# Patient Record
Sex: MALE | Race: BLACK OR AFRICAN AMERICAN | NOT HISPANIC OR LATINO | Employment: FULL TIME | URBAN - METROPOLITAN AREA
[De-identification: names, ages, dates, MRNs, and addresses within clinical notes are randomized per-mention and may not be internally consistent; named-entity substitution may affect disease eponyms.]

---

## 2017-01-05 ENCOUNTER — ALLIED HEALTH/NURSE VISIT (OUTPATIENT)
Dept: NURSING | Facility: CLINIC | Age: 37
End: 2017-01-05

## 2017-01-05 DIAGNOSIS — Z11.1 SCREENING EXAMINATION FOR PULMONARY TUBERCULOSIS: Primary | ICD-10-CM

## 2017-01-05 PROCEDURE — 99207 ZZC NO CHARGE NURSE ONLY: CPT

## 2017-01-05 PROCEDURE — 86580 TB INTRADERMAL TEST: CPT

## 2017-01-05 NOTE — NURSING NOTE
The patient is asked the following questions today and these are his answers:    -Have you had a mantoux administered in the past 30 days?    No  -Have you had a previous positive Mantoux.  No  -Have you received BCG in the past.  No  -Have you had a live vaccine  (MMR, Varicella, OPV, Yellow Fever) in the last 6 weeks.  No  -Have you had and active  viral or bacterial infection in the past 6 weeks.  No  -Have you received corticosteroids or immunosuppressive agents in the past 6 weeks.  No  -Have you been diagnosed with HIV?  No  -Do you have a maglinancy?  No

## 2017-01-18 ENCOUNTER — ALLIED HEALTH/NURSE VISIT (OUTPATIENT)
Dept: NURSING | Facility: CLINIC | Age: 37
End: 2017-01-18

## 2017-01-18 ENCOUNTER — TELEPHONE (OUTPATIENT)
Dept: NURSING | Facility: CLINIC | Age: 37
End: 2017-01-18

## 2017-01-18 DIAGNOSIS — Z11.1 SCREENING EXAMINATION FOR PULMONARY TUBERCULOSIS: Primary | ICD-10-CM

## 2017-01-18 PROCEDURE — 86480 TB TEST CELL IMMUN MEASURE: CPT | Performed by: PHYSICIAN ASSISTANT

## 2017-01-18 PROCEDURE — 99207 ZZC NO CHARGE NURSE ONLY: CPT

## 2017-01-18 PROCEDURE — 36415 COLL VENOUS BLD VENIPUNCTURE: CPT | Performed by: PHYSICIAN ASSISTANT

## 2017-01-18 NOTE — TELEPHONE ENCOUNTER
Spoke with pt's wife with pt's verbal ok, wanted to know why pt had a blood test instead of regular mantoux. Explained that sometimes mantoux can give a false positive and this test will be more accurate. Verbalized understanding.   Clarisa Zuleta RN

## 2017-01-20 LAB
M TB TUBERC IFN-G BLD QL: ABNORMAL
M TB TUBERC IFN-G/MITOGEN IGNF BLD: ABNORMAL IU/ML

## 2017-01-24 ENCOUNTER — TELEPHONE (OUTPATIENT)
Dept: FAMILY MEDICINE | Facility: CLINIC | Age: 37
End: 2017-01-24

## 2017-01-24 NOTE — TELEPHONE ENCOUNTER
Notes Recorded by Misty Sloan on 1/24/2017 at 1:20 PM  Patient doesn't want to keep appt on 01/25/17 as he has no insurance. Spouse Diana would like to discuss results and f/u from here without being seen. Auth is scanned in document table to speak with her.   (appt still in Epic)  ------    Notes Recorded by Shae Armstrong on 1/23/2017 at 11:42 AM  Left message on voicemail for patient to return call to  line([phone])  ------    Notes Recorded by Juliet Preston PA-C on 1/23/2017 at 11:15 AM  Patient needs follow up in the clinic to discuss his positive result.

## 2017-01-24 NOTE — TELEPHONE ENCOUNTER
ADRIANNE  Spoke with wife, Diana, informed her that Juliet stated he needs to come in to be seen. She stated he will be here for appointment 1/25 at 8:40.  Clarisa Zuleta RN

## 2017-01-25 ENCOUNTER — TELEPHONE (OUTPATIENT)
Dept: FAMILY MEDICINE | Facility: CLINIC | Age: 37
End: 2017-01-25

## 2017-01-25 ENCOUNTER — RADIANT APPOINTMENT (OUTPATIENT)
Dept: GENERAL RADIOLOGY | Facility: CLINIC | Age: 37
End: 2017-01-25
Attending: PHYSICIAN ASSISTANT

## 2017-01-25 ENCOUNTER — OFFICE VISIT (OUTPATIENT)
Dept: FAMILY MEDICINE | Facility: CLINIC | Age: 37
End: 2017-01-25

## 2017-01-25 VITALS
WEIGHT: 155.8 LBS | TEMPERATURE: 97.7 F | OXYGEN SATURATION: 97 % | HEART RATE: 78 BPM | HEIGHT: 67 IN | SYSTOLIC BLOOD PRESSURE: 136 MMHG | BODY MASS INDEX: 24.45 KG/M2 | DIASTOLIC BLOOD PRESSURE: 51 MMHG

## 2017-01-25 DIAGNOSIS — B07.8 COMMON WART: ICD-10-CM

## 2017-01-25 DIAGNOSIS — R76.12 POSITIVE QUANTIFERON-TB GOLD TEST: ICD-10-CM

## 2017-01-25 DIAGNOSIS — R76.12 POSITIVE QUANTIFERON-TB GOLD TEST: Primary | ICD-10-CM

## 2017-01-25 DIAGNOSIS — Z22.7 LATENT TUBERCULOSIS: ICD-10-CM

## 2017-01-25 LAB
ALBUMIN SERPL-MCNC: 3.8 G/DL (ref 3.4–5)
ALP SERPL-CCNC: 85 U/L (ref 40–150)
ALT SERPL W P-5'-P-CCNC: 52 U/L (ref 0–70)
AST SERPL W P-5'-P-CCNC: 50 U/L (ref 0–45)
BILIRUB DIRECT SERPL-MCNC: <0.1 MG/DL (ref 0–0.2)
BILIRUB SERPL-MCNC: 0.3 MG/DL (ref 0.2–1.3)
PROT SERPL-MCNC: 7.7 G/DL (ref 6.8–8.8)

## 2017-01-25 PROCEDURE — 36415 COLL VENOUS BLD VENIPUNCTURE: CPT | Performed by: PHYSICIAN ASSISTANT

## 2017-01-25 PROCEDURE — 99203 OFFICE O/P NEW LOW 30 MIN: CPT | Performed by: PHYSICIAN ASSISTANT

## 2017-01-25 PROCEDURE — 71020 XR CHEST 2 VW: CPT

## 2017-01-25 PROCEDURE — 80076 HEPATIC FUNCTION PANEL: CPT | Performed by: PHYSICIAN ASSISTANT

## 2017-01-25 RX ORDER — ISONIAZID 300 MG/1
300 TABLET ORAL DAILY
Qty: 90 TABLET | Refills: 2 | Status: SHIPPED | OUTPATIENT
Start: 2017-01-25 | End: 2018-03-19

## 2017-01-25 NOTE — Clinical Note
[facility]  [address]  [place] [address]  [phone]        January 25, 2017      Sea Solis  [address] [address]        Dear Sea,      Your chest x-ray is negative.      Results for orders placed or performed in visit on 01/25/17   XR Chest 2 Views    Narrative    CHEST TWO VIEWS  1/25/2017 8:50 AM     HISTORY: 36-year-old with nonspecific reaction to cell-mediated  immunity measurement of gamma interferon antigen response without  active tuberculosis.    COMPARISON: None.       Impression    IMPRESSION: Heart size is normal. No pleural effusion, pneumothorax,  or abnormal  area of consolidation. No evidence of hilar or right  paratracheal lymphadenopathy.    GEO RUSH MD       If you have any questions or concerns, please call myself or my nurse at [phone].    Sincerely,      Juliet Preston PA-C/gallitoo

## 2017-01-25 NOTE — LETTER
[facility]  [address]  [place] [address]  [phone]        February 13, 2018    Sea Solis  [address]  [facility] [address]              Dear Sea Solis    This is to remind you that your Hepatic panel  is due.    You may call our office at [phone] to schedule an appointment.    Please disregard this notice if you have already had your labs drawn or made an appointment.        Sincerely,        Juliet Preston PA-C

## 2017-01-25 NOTE — TELEPHONE ENCOUNTER
[facility] forms for latent TB medication completed and faxed to [facility] along with the rx. Awaiting medication to be sent to clinic. Will inform patient when we receive medication. Encounter postponed.

## 2017-01-25 NOTE — PROGRESS NOTES
SUBJECTIVE:                                                    Sea Solis is a 36 year old male who presents to clinic today for the following health issues:    Positive Quantiferon TB Gold  Sometimes coughs when he smokes  Denies fevers, night sweats  Immigrating from [place]      Problem list and histories reviewed & adjusted, as indicated.  Additional history: as documented    Patient Active Problem List   Diagnosis     Latent tuberculosis     No past surgical history on file.    Social History   Substance Use Topics     Smoking status: Not on file     Smokeless tobacco: Not on file     Alcohol Use: Not on file     No family history on file.        ROS:  Constitutional, cardiovascular, pulmonary, skin systems are negative, except as otherwise noted.    OBJECTIVE:                                                    There were no vitals taken for this visit.  There is no height or weight on file to calculate BMI.  GENERAL APPEARANCE: healthy, alert and no distress  RESP: lungs clear to auscultation - no rales, rhonchi or wheezes  CV: regular rates and rhythm, normal S1 S2, no S3 or S4, no murmur, click or rub and no irregular beats  SKIN: multiple warts noted on palms       ASSESSMENT:                                                      1. Positive QuantiFERON-TB Gold test    2. Latent tuberculosis    3. Common wart         PLAN:                                                    Will fill out [facility] paperwork in order to get patient treated. CXR today.     Patient Instructions    some Compound W and use this on the warts on your hands. Apply this every day until they're gone.    Trial some Cortisone 10 ointment on the bump on your abdomen. This is likely an inflammatory response from the nickel on your belt or pants buttons.  Use the Cortisone 10 three times daily x7 days.    We need to repeat your lab work in 1 month, 3 months, 6 months, and 9 months.  You'll be considered treated for latent TB when  you have completed 9 months of treatment.       The patient was in agreement with the plan today and had no questions or concerns prior to leaving the clinic.     Juliet Preston PA-C  [facility]

## 2017-01-25 NOTE — Clinical Note
[facility]  [address]  [place] [address]  Phone: [phone]    January 25, 2017        Sea Solis  [address] [address]          To whom it may concern:    RE: Sea Solis    Patient was seen and treated today at our clinic. He has been diagnosed with latent tuberculosis and will be starting treatment in the near future.     Please contact me for questions or concerns.      Sincerely,          Juliet Preston PA-C

## 2017-01-25 NOTE — PATIENT INSTRUCTIONS
some Compound W and use this on the warts on your hands. Apply this every day until they're gone.    Trial some Cortisone 10 ointment on the bump on your abdomen. This is likely an inflammatory response from the nickel on your belt or pants buttons.  Use the Cortisone 10 three times daily x7 days.    We need to repeat your lab work in 1 month, 3 months, 6 months, and 9 months.  You'll be considered treated for latent TB when you have completed 9 months of treatment.

## 2017-01-25 NOTE — Clinical Note
[facility]  [address]  [place] [address]  [phone]        January 26, 2017      Sea Solis  [address] [address] [address]        Dear Sea,      One of your liver enzymes is very mildly elevated - if you use alcohol or Tylenol please minimize your use.   We'll recheck this panel 1 month after you've started your TB medication.    Results for orders placed or performed in visit on 01/25/17   Hepatic panel   Result Value Ref Range    Bilirubin Direct <0.1 0.0 - 0.2 mg/dL    Bilirubin Total 0.3 0.2 - 1.3 mg/dL    Albumin 3.8 3.4 - 5.0 g/dL    Protein Total 7.7 6.8 - 8.8 g/dL    Alkaline Phosphatase 85 40 - 150 U/L    ALT 52 0 - 70 U/L    AST 50 (H) 0 - 45 U/L       If you have any questions or concerns, please call myself or my nurse at [phone].    Sincerely,      Juliet Preston PA-C/gallitoo

## 2017-01-25 NOTE — PROGRESS NOTES
Quick Note:    Please send the following letter to the patient:    Sea,    Your chest x-ray is negative.     Please call me with any questions or concerns.          Juliet Preston PA-C  ______

## 2017-01-25 NOTE — NURSING NOTE
"Chief Complaint   Patient presents with     PPD Reading       Initial /51 mmHg  Pulse 78  Temp(Src) 97.7  F (36.5  C) (Oral)  Ht 5' 7\" (1.702 m)  Wt 155 lb 12.8 oz (70.67 kg)  BMI 24.40 kg/m2  SpO2 97% Estimated body mass index is 24.4 kg/(m^2) as calculated from the following:    Height as of this encounter: 5' 7\" (1.702 m).    Weight as of this encounter: 155 lb 12.8 oz (70.67 kg).  BP completed using cuff size: regular  Leta Neal MA      "

## 2017-01-25 NOTE — MR AVS SNAPSHOT
After Visit Summary   1/25/2017    Sea Solis    MRN: [id number]           Patient Information     Date Of Birth          1980        Visit Information        Provider Department      1/25/2017 8:40 AM Juliet Preston PA-C [facility]        Today's Diagnoses     Positive QuantiFERON-TB Gold test    -  1     Latent tuberculosis         Common wart           Care Instructions     some Compound W and use this on the warts on your hands. Apply this every day until they're gone.    Trial some Cortisone 10 ointment on the bump on your abdomen. This is likely an inflammatory response from the nickel on your belt or pants buttons.  Use the Cortisone 10 three times daily x7 days.    We need to repeat your lab work in 1 month, 3 months, 6 months, and 9 months.  You'll be considered treated for latent TB when you have completed 9 months of treatment.        Follow-ups after your visit        Future tests that were ordered for you today     Open Standing Orders        Priority Remaining Interval Expires Ordered    Hepatic panel Routine 5/5 1/25/2018 1/25/2017            Who to contact     Normal or non-critical lab and imaging results will be communicated to you by Ripple TVt, letter or phone within 4 business days after the clinic has received the results. If you do not hear from us within 7 days, please contact the clinic through Ripple TVt or phone. If you have a critical or abnormal lab result, we will notify you by phone as soon as possible.  Submit refill requests through StyleQ or call your pharmacy and they will forward the refill request to us. Please allow 3 business days for your refill to be completed.          If you need to speak with a  for additional information , please call: [phone]             Additional Information About Your Visit        StyleQ Information     StyleQ lets you send messages to your doctor, view your test results, renew  "your prescriptions, schedule appointments and more. To sign up, go to [email or web address] . Click on \"Log in\" on the left side of the screen, which will take you to the Welcome page. Then click on \"Sign up Now\" on the right side of the page.     You will be asked to enter the access code listed below, as well as some personal information. Please follow the directions to create your username and password.     Your access code is: [email or web address]  Expires: 2017  9:10 AM     Your access code will  in 90 days. If you need help or a new code, please call your [place] clinic or [phone].        Care EveryWhere ID     This is your Care EveryWhere ID. This could be used by other organizations to access your [place] medical records  [id number]        Your Vitals Were     Pulse Temperature Height BMI (Body Mass Index) Pulse Oximetry       78 97.7  F (36.5  C) (Oral) 5' 7\" (1.702 m) 24.40 kg/m2 97%        Blood Pressure from Last 3 Encounters:   17 136/51    Weight from Last 3 Encounters:   17 155 lb 12.8 oz (70.67 kg)                 Today's Medication Changes          These changes are accurate as of: 17  9:10 AM.  If you have any questions, ask your nurse or doctor.               Start taking these medicines.        Dose/Directions    isoniazid 300 MG tablet   Commonly known as:  NYDRAZID   Used for:  Latent tuberculosis   Started by:  Juliet Preston PA-C        Dose:  300 mg   Take 1 tablet (300 mg) by mouth daily   Quantity:  90 tablet   Refills:  2            Where to get your medicines      Some of these will need a paper prescription and others can be bought over the counter.  Ask your nurse if you have questions.     Bring a paper prescription for each of these medications    - isoniazid 300 MG tablet             Primary Care Provider Office Phone #    Rosa Mahmood [facility] [phone]       No address on file        Thank you!     Thank you for choosing [facility] " [facility]  for your care. Our goal is always to provide you with excellent care. Hearing back from our patients is one way we can continue to improve our services. Please take a few minutes to complete the written survey that you may receive in the mail after your visit with us. Thank you!             Your Updated Medication List - Protect others around you: Learn how to safely use, store and throw away your medicines at www.disposemymeds.org.          This list is accurate as of: 1/25/17  9:10 AM.  Always use your most recent med list.                   Brand Name Dispense Instructions for use    isoniazid 300 MG tablet    NYDRAZID    90 tablet    Take 1 tablet (300 mg) by mouth daily

## 2017-01-25 NOTE — LETTER
[facility]  [address] [place] [address]  [phone]        April 18, 2018    Sea Solis  [address]  [place] [address]              Dear Sea Solis    This is to remind you that your Hepatic panel is due.    You may call our office at [phone] to schedule an appointment.    Please disregard this notice if you have already had your labs drawn or made an appointment.        Sincerely,        Juliet Preston PA-C

## 2017-01-26 ENCOUNTER — TELEPHONE (OUTPATIENT)
Dept: FAMILY MEDICINE | Facility: CLINIC | Age: 37
End: 2017-01-26

## 2017-01-26 NOTE — PROGRESS NOTES
Quick Note:    Please send the following letter to the patient:    Sea,    One of your liver enzymes is very mildly elevated - if you use alcohol or Tylenol please minimize your use.   We'll recheck this panel 1 month after you've started your TB medication.     Please call me with any questions or concerns.          Juliet Preston PA-C  ______

## 2017-02-03 ENCOUNTER — TELEPHONE (OUTPATIENT)
Dept: FAMILY MEDICINE | Facility: CLINIC | Age: 37
End: 2017-02-03

## 2017-02-03 NOTE — TELEPHONE ENCOUNTER
Forms completed stating that patient picked up and started medication today. Form faxed to [facility] and sent to be scanned.

## 2017-03-01 ENCOUNTER — TELEPHONE (OUTPATIENT)
Dept: FAMILY MEDICINE | Facility: CLINIC | Age: 37
End: 2017-03-01

## 2017-03-01 NOTE — TELEPHONE ENCOUNTER
Received Isoniazid from [facility]. Informed patient medication has been brought to [facility] pharmacy for . Patient voiced understanding. States he will  medication tomorrow.

## 2017-03-29 ENCOUNTER — TELEPHONE (OUTPATIENT)
Dept: FAMILY MEDICINE | Facility: CLINIC | Age: 37
End: 2017-03-29

## 2017-03-29 NOTE — TELEPHONE ENCOUNTER
Received Isoniazid from [facility]. Informed patient medication has been brought to [facility] pharmacy for . Patient voiced understanding. Patient states he will  Monday.

## 2017-04-03 ENCOUNTER — OFFICE VISIT (OUTPATIENT)
Dept: FAMILY MEDICINE | Facility: CLINIC | Age: 37
End: 2017-04-03

## 2017-04-03 VITALS
WEIGHT: 153 LBS | TEMPERATURE: 99 F | HEART RATE: 68 BPM | DIASTOLIC BLOOD PRESSURE: 74 MMHG | SYSTOLIC BLOOD PRESSURE: 126 MMHG | HEIGHT: 67 IN | OXYGEN SATURATION: 100 % | BODY MASS INDEX: 24.01 KG/M2

## 2017-04-03 DIAGNOSIS — Z22.7 LATENT TUBERCULOSIS: ICD-10-CM

## 2017-04-03 DIAGNOSIS — L73.1 INGROWN HAIR: Primary | ICD-10-CM

## 2017-04-03 LAB
ALBUMIN SERPL-MCNC: 4.1 G/DL (ref 3.4–5)
ALP SERPL-CCNC: 60 U/L (ref 40–150)
ALT SERPL W P-5'-P-CCNC: 28 U/L (ref 0–70)
AST SERPL W P-5'-P-CCNC: 18 U/L (ref 0–45)
BILIRUB DIRECT SERPL-MCNC: <0.1 MG/DL (ref 0–0.2)
BILIRUB SERPL-MCNC: 0.4 MG/DL (ref 0.2–1.3)
PROT SERPL-MCNC: 7.4 G/DL (ref 6.8–8.8)

## 2017-04-03 PROCEDURE — 99212 OFFICE O/P EST SF 10 MIN: CPT | Performed by: PHYSICIAN ASSISTANT

## 2017-04-03 PROCEDURE — 80076 HEPATIC FUNCTION PANEL: CPT | Performed by: PHYSICIAN ASSISTANT

## 2017-04-03 PROCEDURE — 36415 COLL VENOUS BLD VENIPUNCTURE: CPT | Performed by: PHYSICIAN ASSISTANT

## 2017-04-03 NOTE — MR AVS SNAPSHOT
"              After Visit Summary   4/3/2017    Sea Solis    MRN: [id number]           Patient Information     Date Of Birth          1980        Visit Information        Provider Department      4/3/2017 11:20 AM Juliet Preston PA-C [facility] [place]        Today's Diagnoses     Latent tuberculosis           Follow-ups after your visit        Who to contact     Normal or non-critical lab and imaging results will be communicated to you by MyChart, letter or phone within 4 business days after the clinic has received the results. If you do not hear from us within 7 days, please contact the clinic through MyChart or phone. If you have a critical or abnormal lab result, we will notify you by phone as soon as possible.  Submit refill requests through Bridg or call your pharmacy and they will forward the refill request to us. Please allow 3 business days for your refill to be completed.          If you need to speak with a  for additional information , please call: [phone]             Additional Information About Your Visit        Bridg Information     Bridg lets you send messages to your doctor, view your test results, renew your prescriptions, schedule appointments and more. To sign up, go to [email or web address] . Click on \"Log in\" on the left side of the screen, which will take you to the Welcome page. Then click on \"Sign up Now\" on the right side of the page.     You will be asked to enter the access code listed below, as well as some personal information. Please follow the directions to create your username and password.     Your access code is: [email or web address]  Expires: 2017 10:10 AM     Your access code will  in 90 days. If you need help or a new code, please call your [place] clinic or [phone].        Care EveryWhere ID     This is your Care EveryWhere ID. This could be used by other organizations to access your [place] medical " "records  [id number]        Your Vitals Were     Pulse Temperature Height Pulse Oximetry BMI (Body Mass Index)       68 99  F (37.2  C) (Oral) 5' 7\" (1.702 m) 100% 23.96 kg/m2        Blood Pressure from Last 3 Encounters:   04/03/17 126/74   01/25/17 136/51    Weight from Last 3 Encounters:   04/03/17 153 lb (69.4 kg)   01/25/17 155 lb 12.8 oz (70.7 kg)              We Performed the Following     Hepatic panel        Primary Care Provider Office Phone #    [facility] [phone]       No address on file        Thank you!     Thank you for choosing [facility]  for your care. Our goal is always to provide you with excellent care. Hearing back from our patients is one way we can continue to improve our services. Please take a few minutes to complete the written survey that you may receive in the mail after your visit with us. Thank you!             Your Updated Medication List - Protect others around you: Learn how to safely use, store and throw away your medicines at www.disposemymeds.org.          This list is accurate as of: 4/3/17 11:36 AM.  Always use your most recent med list.                   Brand Name Dispense Instructions for use    isoniazid 300 MG tablet    NYDRAZID    90 tablet    Take 1 tablet (300 mg) by mouth daily         "

## 2017-04-03 NOTE — NURSING NOTE
"Chief Complaint   Patient presents with     Derm Problem       Initial /74  Pulse 68  Temp 99  F (37.2  C) (Oral)  Ht 5' 7\" (1.702 m)  Wt 153 lb (69.4 kg)  SpO2 100%  BMI 23.96 kg/m2 Estimated body mass index is 23.96 kg/(m^2) as calculated from the following:    Height as of this encounter: 5' 7\" (1.702 m).    Weight as of this encounter: 153 lb (69.4 kg).  Medication Reconciliation: complete   Leta Neal MA      "

## 2017-04-03 NOTE — LETTER
[facility]  [address]  [place] [address]  [phone]        April 4, 2017      Sea Solis  [address]  [place] [address]        Dear Sea,      Your liver enzymes remain normal. We'll recheck these in 3 months.     Results for orders placed or performed in visit on 04/03/17   Hepatic panel   Result Value Ref Range    Bilirubin Direct <0.1 0.0 - 0.2 mg/dL    Bilirubin Total 0.4 0.2 - 1.3 mg/dL    Albumin 4.1 3.4 - 5.0 g/dL    Protein Total 7.4 6.8 - 8.8 g/dL    Alkaline Phosphatase 60 40 - 150 U/L    ALT 28 0 - 70 U/L    AST 18 0 - 45 U/L       If you have any questions or concerns, please call myself or my nurse at [phone].    Sincerely,      Juliet Preston PA-C/gallitoo

## 2017-04-03 NOTE — PROGRESS NOTES
"  SUBJECTIVE:                                                    Sea Solis is a 36 year old male who presents to clinic today for the following health issues:    Rash     Onset: x1 year     Description:   Location: pelvic area   Character: bumps   Itching (Pruritis): no     Progression of Symptoms:  same    Accompanying Signs & Symptoms:  Fever: no   Body aches or joint pain: no   Sore throat symptoms: no   Recent cold symptoms: no    History:   Previous similar rash: no     Precipitating factors:   Exposure to similar rash: no   New exposures: None   Recent travel: no     Alleviating factors:  None      Therapies Tried and outcome: cortisone 10 - no relef         Problem list and histories reviewed & adjusted, as indicated.  Additional history: as documented    Patient Active Problem List   Diagnosis     Latent tuberculosis     No past surgical history on file.    Social History   Substance Use Topics     Smoking status: Current Some Day Smoker     Smokeless tobacco: Never Used     Alcohol use 0.0 oz/week     0 Standard drinks or equivalent per week      Comment: 2-3x week     No family history on file.        Reviewed and updated as needed this visit by clinical staff  Tobacco  Allergies  Meds       Reviewed and updated as needed this visit by Provider         ROS:  Constitutional, skin systems are negative, except as otherwise noted.    OBJECTIVE:                                                    /74  Pulse 68  Temp 99  F (37.2  C) (Oral)  Ht 5' 7\" (1.702 m)  Wt 153 lb (69.4 kg)  SpO2 100%  BMI 23.96 kg/m2  Body mass index is 23.96 kg/(m^2).  GENERAL APPEARANCE: healthy, alert and no distress  SKIN: 2 areas of induration/firmness noted at belt line, non tender, non erythematous       ASSESSMENT:                                                      1. Ingrown hair    2. Latent tuberculosis         PLAN:                                                    As discussed previously this is " likely ingrown hairs. He was treated for dermatitis, but nothing has really changed. Patient does not currently have insurance and would like to wait to attempt incision with exploration or area. No signs of infection today.     Labs while he's here.    The patient was in agreement with the plan today and had no questions or concerns prior to leaving the clinic.     Juliet Preston PA-C  [facility]

## 2017-04-04 NOTE — PROGRESS NOTES
Please send the following letter to the patient:    Sea,    Your liver enzymes remain normal. We'll recheck these in 3 months.    Please call me with any questions or concerns.          Juliet Preston PA-C

## 2017-04-27 ENCOUNTER — TELEPHONE (OUTPATIENT)
Dept: FAMILY MEDICINE | Facility: CLINIC | Age: 37
End: 2017-04-27

## 2017-04-27 NOTE — TELEPHONE ENCOUNTER
Received Isoniazid from [facility]. Informed patient medication has been brought to [facility] pharmacy for . Patient voiced understanding.

## 2017-05-23 ENCOUNTER — TELEPHONE (OUTPATIENT)
Dept: FAMILY MEDICINE | Facility: CLINIC | Age: 37
End: 2017-05-23

## 2017-05-23 NOTE — TELEPHONE ENCOUNTER
Received Isoniazid from [facility]. Brought rx to [facility] pharmacy. Left message for patient to call back pod 2 line.([phone])

## 2017-06-21 ENCOUNTER — TELEPHONE (OUTPATIENT)
Dept: FAMILY MEDICINE | Facility: CLINIC | Age: 37
End: 2017-06-21

## 2017-07-14 ENCOUNTER — TELEPHONE (OUTPATIENT)
Dept: FAMILY MEDICINE | Facility: CLINIC | Age: 37
End: 2017-07-14

## 2017-07-14 NOTE — TELEPHONE ENCOUNTER
Received Isoniazid from [facility]. Brought rx to [facility] pharmacy. Patient informed.     Left message for patient to call back pod 2 line.([phone])

## 2017-08-14 ENCOUNTER — TELEPHONE (OUTPATIENT)
Dept: FAMILY MEDICINE | Facility: CLINIC | Age: 37
End: 2017-08-14

## 2017-08-14 NOTE — TELEPHONE ENCOUNTER
Received Isoniazid 300 mg tablet from [facility]. Brought rx to [facility] pharmacy.    Left message on voicemail for patient to return call

## 2017-08-15 NOTE — TELEPHONE ENCOUNTER
Spoke with patient advised TB med at [facility] for him to

## 2017-09-13 ENCOUNTER — TELEPHONE (OUTPATIENT)
Dept: FAMILY MEDICINE | Facility: CLINIC | Age: 37
End: 2017-09-13

## 2017-09-13 NOTE — TELEPHONE ENCOUNTER
Received Isoniazid 300mg and brought to the [facility] pharmacy. LVM for patient to return call to pod two.       Emi Graham CMA

## 2018-03-19 ENCOUNTER — RADIANT APPOINTMENT (OUTPATIENT)
Dept: GENERAL RADIOLOGY | Facility: CLINIC | Age: 38
End: 2018-03-19
Payer: COMMERCIAL

## 2018-03-19 ENCOUNTER — OFFICE VISIT (OUTPATIENT)
Dept: FAMILY MEDICINE | Facility: CLINIC | Age: 38
End: 2018-03-19
Payer: COMMERCIAL

## 2018-03-19 VITALS
OXYGEN SATURATION: 99 % | HEART RATE: 74 BPM | TEMPERATURE: 97.4 F | BODY MASS INDEX: 25.36 KG/M2 | SYSTOLIC BLOOD PRESSURE: 107 MMHG | WEIGHT: 157.8 LBS | DIASTOLIC BLOOD PRESSURE: 69 MMHG | HEIGHT: 66 IN

## 2018-03-19 DIAGNOSIS — Z13.220 SCREENING FOR LIPOID DISORDERS: ICD-10-CM

## 2018-03-19 DIAGNOSIS — N52.9 ERECTILE DYSFUNCTION, UNSPECIFIED ERECTILE DYSFUNCTION TYPE: ICD-10-CM

## 2018-03-19 DIAGNOSIS — L72.3 SEBACEOUS CYST: ICD-10-CM

## 2018-03-19 DIAGNOSIS — M79.644 PAIN OF FINGER OF RIGHT HAND: ICD-10-CM

## 2018-03-19 DIAGNOSIS — M79.644 PAIN OF FINGER OF RIGHT HAND: Primary | ICD-10-CM

## 2018-03-19 DIAGNOSIS — L98.9 SKIN LESION: ICD-10-CM

## 2018-03-19 DIAGNOSIS — R93.89 ABNORMAL X-RAY: ICD-10-CM

## 2018-03-19 DIAGNOSIS — Z13.1 SCREENING FOR DIABETES MELLITUS: ICD-10-CM

## 2018-03-19 LAB
ALBUMIN SERPL-MCNC: 4 G/DL (ref 3.4–5)
ALP SERPL-CCNC: 62 U/L (ref 40–150)
ALT SERPL W P-5'-P-CCNC: 31 U/L (ref 0–70)
ANION GAP SERPL CALCULATED.3IONS-SCNC: 11 MMOL/L (ref 3–14)
AST SERPL W P-5'-P-CCNC: 19 U/L (ref 0–45)
BASOPHILS # BLD AUTO: 0.1 10E9/L (ref 0–0.2)
BASOPHILS NFR BLD AUTO: 1 %
BILIRUB SERPL-MCNC: 0.4 MG/DL (ref 0.2–1.3)
BUN SERPL-MCNC: 13 MG/DL (ref 7–30)
CALCIUM SERPL-MCNC: 8.6 MG/DL (ref 8.5–10.1)
CHLORIDE SERPL-SCNC: 104 MMOL/L (ref 94–109)
CHOLEST SERPL-MCNC: 282 MG/DL
CO2 SERPL-SCNC: 25 MMOL/L (ref 20–32)
CREAT SERPL-MCNC: 0.92 MG/DL (ref 0.66–1.25)
CRP SERPL-MCNC: <2.9 MG/L (ref 0–8)
DIFFERENTIAL METHOD BLD: NORMAL
EOSINOPHIL # BLD AUTO: 0.2 10E9/L (ref 0–0.7)
EOSINOPHIL NFR BLD AUTO: 4.8 %
ERYTHROCYTE [DISTWIDTH] IN BLOOD BY AUTOMATED COUNT: 14 % (ref 10–15)
ERYTHROCYTE [SEDIMENTATION RATE] IN BLOOD BY WESTERGREN METHOD: 5 MM/H (ref 0–15)
GFR SERPL CREATININE-BSD FRML MDRD: >90 ML/MIN/1.7M2
GLUCOSE SERPL-MCNC: 89 MG/DL (ref 70–99)
HBA1C MFR BLD: 5.4 % (ref 4.3–6)
HCT VFR BLD AUTO: 45.3 % (ref 40–53)
HDLC SERPL-MCNC: 62 MG/DL
HGB BLD-MCNC: 15.2 G/DL (ref 13.3–17.7)
LDLC SERPL CALC-MCNC: 192 MG/DL
LYMPHOCYTES # BLD AUTO: 2.1 10E9/L (ref 0.8–5.3)
LYMPHOCYTES NFR BLD AUTO: 43.5 %
MCH RBC QN AUTO: 29.9 PG (ref 26.5–33)
MCHC RBC AUTO-ENTMCNC: 33.6 G/DL (ref 31.5–36.5)
MCV RBC AUTO: 89 FL (ref 78–100)
MONOCYTES # BLD AUTO: 0.4 10E9/L (ref 0–1.3)
MONOCYTES NFR BLD AUTO: 8.5 %
NEUTROPHILS # BLD AUTO: 2 10E9/L (ref 1.6–8.3)
NEUTROPHILS NFR BLD AUTO: 42.2 %
NONHDLC SERPL-MCNC: 220 MG/DL
PLATELET # BLD AUTO: 200 10E9/L (ref 150–450)
POTASSIUM SERPL-SCNC: 3.8 MMOL/L (ref 3.4–5.3)
PROT SERPL-MCNC: 7.6 G/DL (ref 6.8–8.8)
PSA SERPL-ACNC: 0.44 UG/L (ref 0–4)
RBC # BLD AUTO: 5.08 10E12/L (ref 4.4–5.9)
SODIUM SERPL-SCNC: 140 MMOL/L (ref 133–144)
TRIGL SERPL-MCNC: 139 MG/DL
TSH SERPL DL<=0.005 MIU/L-ACNC: 0.71 MU/L (ref 0.4–4)
WBC # BLD AUTO: 4.8 10E9/L (ref 4–11)

## 2018-03-19 PROCEDURE — 86140 C-REACTIVE PROTEIN: CPT | Performed by: PHYSICIAN ASSISTANT

## 2018-03-19 PROCEDURE — 99214 OFFICE O/P EST MOD 30 MIN: CPT | Performed by: PHYSICIAN ASSISTANT

## 2018-03-19 PROCEDURE — 36415 COLL VENOUS BLD VENIPUNCTURE: CPT | Performed by: PHYSICIAN ASSISTANT

## 2018-03-19 PROCEDURE — 73140 X-RAY EXAM OF FINGER(S): CPT | Mod: RT

## 2018-03-19 PROCEDURE — 80061 LIPID PANEL: CPT | Performed by: PHYSICIAN ASSISTANT

## 2018-03-19 PROCEDURE — 84443 ASSAY THYROID STIM HORMONE: CPT | Performed by: PHYSICIAN ASSISTANT

## 2018-03-19 PROCEDURE — 83036 HEMOGLOBIN GLYCOSYLATED A1C: CPT | Performed by: PHYSICIAN ASSISTANT

## 2018-03-19 PROCEDURE — 80053 COMPREHEN METABOLIC PANEL: CPT | Performed by: PHYSICIAN ASSISTANT

## 2018-03-19 PROCEDURE — 85025 COMPLETE CBC W/AUTO DIFF WBC: CPT | Performed by: PHYSICIAN ASSISTANT

## 2018-03-19 PROCEDURE — G0103 PSA SCREENING: HCPCS | Performed by: PHYSICIAN ASSISTANT

## 2018-03-19 PROCEDURE — 85652 RBC SED RATE AUTOMATED: CPT | Performed by: PHYSICIAN ASSISTANT

## 2018-03-19 RX ORDER — SILDENAFIL 50 MG/1
50 TABLET, FILM COATED ORAL DAILY PRN
Qty: 6 TABLET | Refills: 1 | Status: SHIPPED | OUTPATIENT
Start: 2018-03-19 | End: 2020-11-27

## 2018-03-19 NOTE — MR AVS SNAPSHOT
After Visit Summary   3/19/2018    Sea Solis    MRN: [id number]           Patient Information     Date Of Birth          1980        Visit Information        Provider Department      3/19/2018 10:20 AM Riki Aguiar PA [facility]        Today's Diagnoses     Pain of finger of right hand    -  1    Sebaceous cyst        Screening for lipoid disorders        Skin lesion        Screening for diabetes mellitus        Erectile dysfunction, unspecified erectile dysfunction type        Abnormal x-ray          Care Instructions      Erectile Dysfunction: Rebuilding Intimacy     Man and woman sitting together on couch, smiling.     Being intimate means being close as a couple, with sex as just one part of intimacy. A hug, a kind remark, or a gift can be very romantic, even if sex doesn t follow. So renew your intimacy along with your sex life. Learn to talk with, and listen to, your partner. And remember that your value as a man goes beyond what you do in bed.  Tips for intimacy  As you and your partner become closer to each other, you might find that you can enjoy sex more.    Show and tell your partner what you like. If you don t, your partner might not know what you want.    Ask your partner to show you how he or she wants to be touched.    Be patient. Take your time. Relax. Give yourselves a chance to become aroused.    Try being intimate without intercourse. Instead, exchange back rubs. Or try kissing, or just a soft touch.    Focus on what you and your partner like about each other. This could be a certain laugh or smile, or other joys you share together.  Tips for talking  It s OK to be shy when you talk about sex with your partner. But talking gets easier with practice. Use these tips when you talk with each other.    Choose a time and place when you re both relaxed and comfortable.    Listen to your partner. Try repeating back what you think the other has  said. This will help show if you ve understood each other.    Don t  what your partner says. Talking feels safer if you don t criticize each other.    Don t be defensive. You may not like something your partner says. But you can still thank your partner for being honest.    Think about meeting with a counselor. They re trained to help couples who are being treated for ED.  Date Last Reviewed: 1/1/2017 2000-2017 [employer]. [address] [place] [address]. All rights reserved. This information is not intended as a substitute for professional medical care. Always follow your healthcare professional's instructions.        Epidermoid Cyst (Sebaceous Cyst), No Infection  An epidermoid cyst (sebaceous cyst) is a term that refers to 2 similar types of cysts: those found in the skin (epidermoid), and those found around hair follicles (pilar).  Some general facts about these cysts:    A cyst is a sac filled with material that is often cheesy, fatty, oily, or fibrous. The material in them can be thick (like cottage cheese) or liquid.    They form slowly under the skin, and can be found on most parts of the body. They are most often found in hairier areas like the scalp, face, upper back, and genitals.    You can usually move them slightly if you try.    They can be smaller than a pea or as large as a few inches.    They are usually not painful, unless they become inflamed or infected.    The area around the cyst may smell bad. If the cyst breaks open, the material inside it often smells bad too.  Causes  Epidermoid cysts are caused when skin (epidermal) cells move under the skin surface, or are covered over by it. These cells continue to multiply, like skin does normally. They then form a wall around themselves (cyst) and secrete normal skin fluids (keratin). This may be developmental. But it often happens because of an injury to the skin.  Epidermoid cysts are often found around hair  follicles. These follicles are like cysts, but they have openings. Normal lubricating oils for your hair are sent out through these openings. A cyst occurs when an opening becomes blocked or the site inflamed. This often occurs when there is damage to the hair follicles by a scrape or wound.    Pilar cysts are similar to epidermoid cysts. But they start from a different part of the hair follicle, and are more likely to be on the scalp.  Symptoms    Feeling a lump just beneath the skin    It may or may not be painful    The cyst may or may not smell bad    The cyst may become inflamed or red    The cyst may leak fluid or thick material  Home care  Epidermoid cysts often go away without any treatment. If your cyst doesn t go away, and it bothers you, it may be drained or removed. If the cyst drains on its own, it may return. Resist the temptation to squeeze, pop, stick a needle in it, or cut it open. This often leads to an infection and scarring. If it gets severely inflamed or infected, you should seek medical care. Be sure to clean the cyst area when bathing or showering. Watch for the signs of infection listed below.  Follow-up care  Follow up with your healthcare provider, or as advised.  When to seek medical advice  Call your healthcare provider right away if any of these occur:    Swelling, redness, or pain    Pus coming from the cyst  Date Last Reviewed: 8/1/2016 2000-2017 [employer]. [address] [place] [address]. All rights reserved. This information is not intended as a substitute for professional medical care. Always follow your healthcare professional's instructions.        Understanding Erectile Dysfunction    Erectile dysfunction (ED) is a problem getting an erection firm enough or keeping it long enough for intercourse. The problem can happen to any man at any age. But health problems that can lead to ED become more common as a man ages. Up to half of men over age 40  experience ED at some point.  Causes of ED  ED can have many causes. Most are physical. Some are emotional issues. Often, a combination of causes is involved. Causes of ED may include:    Medical conditions such as diabetes or depression    Smoking tobacco or marijuana    Drinking too much alcohol    Side effects of medications    Injury to nerves or blood vessels    Emotional issues such as stress or relationship problems  ED can be treated  Prescription medications for ED are available. They help many men who try them. Depending upon the cause of the ED, though, medications may not be enough. In these cases, other treatment options are available. These include erectile aids and surgery. Your health care provider can tell you more about the treatment that is right for you. And new treatments for ED are being studied. No matter what the treatment you decide on, stay in touch with your doctor. If your symptoms persist, he or she may be able to adjust your current treatment or try something new.  Date Last Reviewed: 1/1/2017 2000-2017 [employer]. [address] [place] [address]. All rights reserved. This information is not intended as a substitute for professional medical care. Always follow your healthcare professional's instructions.                Follow-ups after your visit        Additional Services     DERMATOLOGY REFERRAL       Your provider has referred you to: [facility]: [facility] [phone]   [email or web address]    Please be aware that coverage of these services is subject to the terms and limitations of your health insurance plan.  Call member services at your health plan with any benefit or coverage questions.      Please bring the following with you to your appointment:    (1) Any X-Rays, CTs or MRIs which have been performed.  Contact the facility where they were done to arrange for  prior  to your scheduled appointment.    (2) List of current medications  (3) This referral request   (4) Any documents/labs given to you for this referral            ORTHO  REFERRAL       [facility] is referring you to the Orthopedic  Services at [facility].       The  Representative will assist you in the coordination of your Orthopedic and Musculoskeletal Care as prescribed by your physician.    The  Representative will call you within 1 business day to help schedule your appointment, or you may contact the  Representative at:    All areas ~ [phone]     Type of Referral : Non Surgical       Timeframe requested: 3 - 5 days    Coverage of these services is subject to the terms and limitations of your health insurance plan.  Please call member services at your health plan with any benefit or coverage questions.      If X-rays, CT or MRI's have been performed, please contact the facility where they were done to arrange for , prior to your scheduled appointment.  Please bring this referral request to your appointment and present it to your specialist.                  Who to contact     If you have questions or need follow up information about today's clinic visit or your schedule please contact [facility] TANMA[facility] directly at [phone].  Normal or non-critical lab and imaging results will be communicated to you by MyChart, letter or phone within 4 business days after the clinic has received the results. If you do not hear from us within 7 days, please contact the clinic through MyChart or phone. If you have a critical or abnormal lab result, we will notify you by phone as soon as possible.  Submit refill requests through [employer] or call your pharmacy and they will forward the refill request to us. Please allow 3 business days for your refill to be completed.          Additional Information About Your Visit       "  MyChart Information     [employer] lets you send messages to your doctor, view your test results, renew your prescriptions, schedule appointments and more. To sign up, go to [email or web address] . Click on \"Log in\" on the left side of the screen, which will take you to the Welcome page. Then click on \"Sign up Now\" on the right side of the page.     You will be asked to enter the access code listed below, as well as some personal information. Please follow the directions to create your username and password.     Your access code is: [email or web address]  Expires: 2018 11:23 AM     Your access code will  in 90 days. If you need help or a new code, please call your [place] clinic or [phone].        Care EveryWhere ID     This is your Care EveryWhere ID. This could be used by other organizations to access your [facility] medical records  [id number]        Your Vitals Were     Pulse Temperature Height Pulse Oximetry BMI (Body Mass Index)       74 97.4  F (36.3  C) (Oral) 5' 5.55\" (1.665 m) 99% 25.82 kg/m2        Blood Pressure from Last 3 Encounters:   18 107/69   17 126/74   17 136/51    Weight from Last 3 Encounters:   18 157 lb 12.8 oz (71.6 kg)   17 153 lb (69.4 kg)   17 155 lb 12.8 oz (70.7 kg)              We Performed the Following     CBC with platelets differential     Comprehensive metabolic panel     CRP inflammation     DERMATOLOGY REFERRAL     Erythrocyte sedimentation rate auto     Hemoglobin A1c     JUST IN CASE     Lipid panel reflex to direct LDL Non-fasting     ORTHO  REFERRAL     PSA, screen     TSH with free T4 reflex          Today's Medication Changes          These changes are accurate as of 3/19/18 11:23 AM.  If you have any questions, ask your nurse or doctor.               Start taking these medicines.        Dose/Directions    sildenafil 50 MG tablet   Commonly known as:  VIAGRA   Used for:  Erectile dysfunction, unspecified erectile " dysfunction type   Started by:  Riki Aguiar PA        Dose:  50 mg   Take 1 tablet (50 mg) by mouth daily as needed 30 min to 4 hrs before sex. Do not use with nitroglycerin, terazosin or doxazosin.   Quantity:  6 tablet   Refills:  1            Where to get your medicines      These medications were sent to [id number] - [place], [place] - [address] [facility]  [address] [address]    Hours:  24-hours Phone:  [phone]     sildenafil 50 MG tablet                Primary Care Provider Office Phone # Fax #    [facility] [phone] [address] [facility] [address]        Equal Access to Services     ISADORA [facility] Center[facility]ESSA AH: Hadii aad ku hadasho Soomaali, waaxda luqadaha, qaybta kaalmada adeegyada, waxay katiain haymumtaz rowe . So [facility] [phone].    ATENCIÓN: Si habla español, tiene a mathias disposición servicios gratuitos de asistencia lingüística. [facility] [phone].    We comply with applicable federal civil rights laws and [place] laws. We do not discriminate on the basis of race, color, national origin, age, disability, sex, sexual orientation, or gender identity.            Thank you!     Thank you for choosing [facility]  for your care. Our goal is always to provide you with excellent care. Hearing back from our patients is one way we can continue to improve our services. Please take a few minutes to complete the written survey that you may receive in the mail after your visit with us. Thank you!             Your Updated Medication List - Protect others around you: Learn how to safely use, store and throw away your medicines at www.disposemymeds.org.          This list is accurate as of 3/19/18 11:23 AM.  Always use your most recent med list.                   Brand Name Dispense Instructions for use Diagnosis    sildenafil 50 MG tablet    VIAGRA    6 tablet     Take 1 tablet (50 mg) by mouth daily as needed 30 min to 4 hrs before sex. Do not use with nitroglycerin, terazosin or doxazosin.    Erectile dysfunction, unspecified erectile dysfunction type

## 2018-03-19 NOTE — LETTER
March 21, 2018      Sea Solis  [address]  [place] [address]        Dear ,    We are writing to inform you of your test results.    Your LDL (bad cholesterol)  was very elevated.  Genetics, diet, weight and low exercise levels can contribute to this. Elevated LDL cholesterol and triglycerides as well as low HDL cholesterol all increase a person's risk for heart and vascular disease. You need to recheck fasting labs yearly. Maintaining a healthy diet with lean proteins, whole grains and healthy fats such as olive oil as well as regular exercise and maintaining an appropriate weight all contribute to healthier cholesterol levels.     All of the rest of your test results were within the expected range for you..      Resulted Orders   Lipid panel reflex to direct LDL Non-fasting   Result Value Ref Range    Cholesterol 282 (H) <200 mg/dL      Comment:      Desirable:       <200 mg/dl    Triglycerides 139 <150 mg/dL      Comment:      Non Fasting    HDL Cholesterol 62 >39 mg/dL    LDL Cholesterol Calculated 192 (H) <100 mg/dL      Comment:      Above desirable:  100-129 mg/dl  Borderline High:  130-159 mg/dL  High:             160-189 mg/dL  Very high:       >189 mg/dl      Non HDL Cholesterol 220 (H) <130 mg/dL      Comment:      Above Desirable:  130-159 mg/dl  Borderline high:  160-189 mg/dl  High:             190-219 mg/dl  Very high:       >219 mg/dl     Hemoglobin A1c   Result Value Ref Range    Hemoglobin A1C 5.4 4.3 - 6.0 %   CBC with platelets differential   Result Value Ref Range    WBC 4.8 4.0 - 11.0 10e9/L    RBC Count 5.08 4.4 - 5.9 10e12/L    Hemoglobin 15.2 13.3 - 17.7 g/dL    Hematocrit 45.3 40.0 - 53.0 %    MCV 89 78 - 100 fl    MCH 29.9 26.5 - 33.0 pg    MCHC 33.6 31.5 - 36.5 g/dL    RDW 14.0 10.0 - 15.0 %    Platelet Count 200 150 - 450 10e9/L    Diff Method Automated Method     % Neutrophils 42.2 %    % Lymphocytes 43.5 %    % Monocytes 8.5 %    % Eosinophils  4.8 %    % Basophils 1.0 %    Absolute Neutrophil 2.0 1.6 - 8.3 10e9/L    Absolute Lymphocytes 2.1 0.8 - 5.3 10e9/L    Absolute Monocytes 0.4 0.0 - 1.3 10e9/L    Absolute Eosinophils 0.2 0.0 - 0.7 10e9/L    Absolute Basophils 0.1 0.0 - 0.2 10e9/L   Comprehensive metabolic panel   Result Value Ref Range    Sodium 140 133 - 144 mmol/L    Potassium 3.8 3.4 - 5.3 mmol/L    Chloride 104 94 - 109 mmol/L    Carbon Dioxide 25 20 - 32 mmol/L    Anion Gap 11 3 - 14 mmol/L    Glucose 89 70 - 99 mg/dL      Comment:      Non Fasting    Urea Nitrogen 13 7 - 30 mg/dL    Creatinine 0.92 0.66 - 1.25 mg/dL    GFR Estimate >90 >60 mL/min/1.7m2      Comment:      Non  GFR Calc    GFR Estimate If Black >90 >60 mL/min/1.7m2      Comment:       GFR Calc    Calcium 8.6 8.5 - 10.1 mg/dL    Bilirubin Total 0.4 0.2 - 1.3 mg/dL    Albumin 4.0 3.4 - 5.0 g/dL    Protein Total 7.6 6.8 - 8.8 g/dL    Alkaline Phosphatase 62 40 - 150 U/L    ALT 31 0 - 70 U/L    AST 19 0 - 45 U/L   TSH with free T4 reflex   Result Value Ref Range    TSH 0.71 0.40 - 4.00 mU/L   PSA, screen   Result Value Ref Range    PSA 0.44 0 - 4 ug/L      Comment:      Assay Method:  Chemiluminescence using Siemens Vista analyzer   CRP inflammation   Result Value Ref Range    CRP Inflammation <2.9 0.0 - 8.0 mg/L   Erythrocyte sedimentation rate auto   Result Value Ref Range    Sed Rate 5 0 - 15 mm/h       If you have any questions or concerns, please call the clinic at the number listed above.       Sincerely,        ADELA Mendez

## 2018-03-19 NOTE — PATIENT INSTRUCTIONS
Erectile Dysfunction: Rebuilding Intimacy     Man and woman sitting together on couch, smiling.     Being intimate means being close as a couple, with sex as just one part of intimacy. A hug, a kind remark, or a gift can be very romantic, even if sex doesn t follow. So renew your intimacy along with your sex life. Learn to talk with, and listen to, your partner. And remember that your value as a man goes beyond what you do in bed.  Tips for intimacy  As you and your partner become closer to each other, you might find that you can enjoy sex more.    Show and tell your partner what you like. If you don t, your partner might not know what you want.    Ask your partner to show you how he or she wants to be touched.    Be patient. Take your time. Relax. Give yourselves a chance to become aroused.    Try being intimate without intercourse. Instead, exchange back rubs. Or try kissing, or just a soft touch.    Focus on what you and your partner like about each other. This could be a certain laugh or smile, or other joys you share together.  Tips for talking  It s OK to be shy when you talk about sex with your partner. But talking gets easier with practice. Use these tips when you talk with each other.    Choose a time and place when you re both relaxed and comfortable.    Listen to your partner. Try repeating back what you think the other has said. This will help show if you ve understood each other.    Don t  what your partner says. Talking feels safer if you don t criticize each other.    Don t be defensive. You may not like something your partner says. But you can still thank your partner for being honest.    Think about meeting with a counselor. They re trained to help couples who are being treated for ED.  Date Last Reviewed: 1/1/2017 2000-2017 [employer]. [address], [place] [address]. All rights reserved. This information is not intended as a substitute for professional medical  care. Always follow your healthcare professional's instructions.        Epidermoid Cyst (Sebaceous Cyst), No Infection  An epidermoid cyst (sebaceous cyst) is a term that refers to 2 similar types of cysts: those found in the skin (epidermoid), and those found around hair follicles (pilar).  Some general facts about these cysts:    A cyst is a sac filled with material that is often cheesy, fatty, oily, or fibrous. The material in them can be thick (like cottage cheese) or liquid.    They form slowly under the skin, and can be found on most parts of the body. They are most often found in hairier areas like the scalp, face, upper back, and genitals.    You can usually move them slightly if you try.    They can be smaller than a pea or as large as a few inches.    They are usually not painful, unless they become inflamed or infected.    The area around the cyst may smell bad. If the cyst breaks open, the material inside it often smells bad too.  Causes  Epidermoid cysts are caused when skin (epidermal) cells move under the skin surface, or are covered over by it. These cells continue to multiply, like skin does normally. They then form a wall around themselves (cyst) and secrete normal skin fluids (keratin). This may be developmental. But it often happens because of an injury to the skin.  Epidermoid cysts are often found around hair follicles. These follicles are like cysts, but they have openings. Normal lubricating oils for your hair are sent out through these openings. A cyst occurs when an opening becomes blocked or the site inflamed. This often occurs when there is damage to the hair follicles by a scrape or wound.    Pilar cysts are similar to epidermoid cysts. But they start from a different part of the hair follicle, and are more likely to be on the scalp.  Symptoms    Feeling a lump just beneath the skin    It may or may not be painful    The cyst may or may not smell bad    The cyst may become inflamed or  red    The cyst may leak fluid or thick material  Home care  Epidermoid cysts often go away without any treatment. If your cyst doesn t go away, and it bothers you, it may be drained or removed. If the cyst drains on its own, it may return. Resist the temptation to squeeze, pop, stick a needle in it, or cut it open. This often leads to an infection and scarring. If it gets severely inflamed or infected, you should seek medical care. Be sure to clean the cyst area when bathing or showering. Watch for the signs of infection listed below.  Follow-up care  Follow up with your healthcare provider, or as advised.  When to seek medical advice  Call your healthcare provider right away if any of these occur:    Swelling, redness, or pain    Pus coming from the cyst  Date Last Reviewed: 8/1/2016 2000-2017 [employer]. [address] [place] [address]. All rights reserved. This information is not intended as a substitute for professional medical care. Always follow your healthcare professional's instructions.        Understanding Erectile Dysfunction    Erectile dysfunction (ED) is a problem getting an erection firm enough or keeping it long enough for intercourse. The problem can happen to any man at any age. But health problems that can lead to ED become more common as a man ages. Up to half of men over age 40 experience ED at some point.  Causes of ED  ED can have many causes. Most are physical. Some are emotional issues. Often, a combination of causes is involved. Causes of ED may include:    Medical conditions such as diabetes or depression    Smoking tobacco or marijuana    Drinking too much alcohol    Side effects of medications    Injury to nerves or blood vessels    Emotional issues such as stress or relationship problems  ED can be treated  Prescription medications for ED are available. They help many men who try them. Depending upon the cause of the ED, though, medications may not be  enough. In these cases, other treatment options are available. These include erectile aids and surgery. Your health care provider can tell you more about the treatment that is right for you. And new treatments for ED are being studied. No matter what the treatment you decide on, stay in touch with your doctor. If your symptoms persist, he or she may be able to adjust your current treatment or try something new.  Date Last Reviewed: 1/1/2017 2000-2017 [employer]. [address] [place] [address]. All rights reserved. This information is not intended as a substitute for professional medical care. Always follow your healthcare professional's instructions.

## 2018-03-19 NOTE — PROGRESS NOTES
"  SUBJECTIVE:   Sea Solis is a 37 year old male who presents to clinic today for the following health issues:  Rash/Cyst  Onset: couple years,     Description:   Location: left clavicle  Character: raised  Itching (Pruritis): no     Progression of Symptoms:  same    Accompanying Signs & Symptoms:  Fever: no   Body aches or joint pain: no   Sore throat symptoms: no   Recent cold symptoms: no     History:   Previous similar rash: no     Precipitating factors:   Exposure to similar rash: no   New exposures: None   Recent travel: no   Therapies Tried and outcome: cream    Concern:Right hand 3rd digit injury- still feeling kym. in PIPJ n. 6 months ago smashed while lifting somethings, KANDI    Concern: Rash on groin area after shaving. No itching or pain.hx ingrown heairs here.      Also reports difficulty maintaining erections, is able to get them but only last a few minutes. No urinary complaints.  + fam hx DM.  Same partner             No Known Allergies    History reviewed. No pertinent past medical history.      No current outpatient prescriptions on file prior to visit.  No current facility-administered medications on file prior to visit.     Social History   Substance Use Topics     Smoking status: Current Some Day Smoker     Smokeless tobacco: Never Used     Alcohol use 0.0 oz/week     0 Standard drinks or equivalent per week      Comment: 2-3x week       ROS:  General: negative for fever  SKIN: + as above   Emergency Department as above  MUSC finger as above    Physcial Exam:  /69 (BP Location: Left arm, Patient Position: Chair, Cuff Size: Adult Regular)  Pulse 74  Temp 97.4  F (36.3  C) (Oral)  Ht 5' 5.55\" (1.665 m)  Wt 157 lb 12.8 oz (71.6 kg)  SpO2 99%  BMI 25.82 kg/m2    GENERAL: alert, no acute distress  EYES: conjunctival clear  RESP: Regular breathing rate  NEURO: awake .  MUSC; rt 3rd Finger: KANDI, nonttp, minimal sweling, no erythema, cap refil intact  SKIN: left upper ACW with 7 " mm raised fluctuance lesion w/o TTP or erythema, there are three much smaller similar lesion along the lower sternum.  In the suprapubic region iwht a roughly rectganular 2x1.5 cm region of induration w/o erythema or TTP    My independent read of XR is opacities in proximal 3rd digit concerning for nonhealing fx or infectoin      ASSESSMENT: Clavicular lesion appears to be a sebaceous cyst,. The sternal lesions could be as well, the suprapubic region may simply be scar tissue.  Awaiting labs and ortho eval for finger- does not appear acutely infected.    ICD-10-CM    1. Pain of finger of right hand M79.644 XR Finger Right G/E 2 Views     JUST IN CASE     ORTHO  REFERRAL   2. Sebaceous cyst L72.3 DERMATOLOGY REFERRAL   3. Screening for lipoid disorders Z13.220 Lipid panel reflex to direct LDL Non-fasting   4. Skin lesion L98.9 DERMATOLOGY REFERRAL   5. Screening for diabetes mellitus Z13.1 Hemoglobin A1c   6. Erectile dysfunction, unspecified erectile dysfunction type N52.9 CBC with platelets differential     Comprehensive metabolic panel     TSH with free T4 reflex     PSA, screen     sildenafil (VIAGRA) 50 MG tablet   7. Abnormal x-ray R93.8 CRP inflammation     Erythrocyte sedimentation rate auto     ORTHO  REFERRAL       PLAN:  See today's orders.  Follow-up as above.  Advised about symptoms which might herald more serious problems.

## 2018-03-21 PROBLEM — E78.5 HYPERLIPIDEMIA: Status: ACTIVE | Noted: 2018-03-21

## 2018-03-21 NOTE — PROGRESS NOTES
Please send letter:     Your LDL (bad cholesterol)  was very elevated.  Genetics, diet, weight and low exercise levels can contribute to this. Elevated LDL cholesterol and triglycerides as well as low HDL cholesterol all increase a person's risk for heart and vascular disease. You need to recheck fasting labs yearly. Maintaining a healthy diet with lean proteins, whole grains and healthy fats such as olive oil as well as regular exercise and maintaining an appropriate weight all contribute to healthier cholesterol levels.    All of the rest of your test results were within the expected range for you..       Riki Aguiar

## 2018-04-10 ENCOUNTER — OFFICE VISIT (OUTPATIENT)
Dept: DERMATOLOGY | Facility: CLINIC | Age: 38
End: 2018-04-10
Payer: COMMERCIAL

## 2018-04-10 VITALS — DIASTOLIC BLOOD PRESSURE: 64 MMHG | HEART RATE: 73 BPM | SYSTOLIC BLOOD PRESSURE: 115 MMHG | OXYGEN SATURATION: 99 %

## 2018-04-10 DIAGNOSIS — L72.0 EPIDERMAL CYST: Primary | ICD-10-CM

## 2018-04-10 PROCEDURE — 99213 OFFICE O/P EST LOW 20 MIN: CPT | Performed by: PHYSICIAN ASSISTANT

## 2018-04-10 NOTE — NURSING NOTE
"Chief Complaint   Patient presents with     Derm Problem     spot left shoulder and groin area        Initial /64  Pulse 73  SpO2 99% Estimated body mass index is 25.82 kg/(m^2) as calculated from the following:    Height as of 3/19/18: 1.665 m (5' 5.55\").    Weight as of 3/19/18: 71.6 kg (157 lb 12.8 oz).  Medication Reconciliation: complete    "

## 2018-04-10 NOTE — LETTER
4/10/2018         RE: Sea Solis  [address]  [place] [address]        Dear Colleague,    Thank you for referring your patient, Sea Solis, to the [facility]. Please see a copy of my visit note below.    HPI:   Sea Solis is a 37 year old male who presents for evaluation of raised spots beneath the skin  chief complaint  Location: one area on the left shoulder and two areas on the lower abdomen   Condition present for:  About 1 year or more.   Previous treatments include: none    Review Of Systems  Eyes: negative  Ears/Nose/Throat: negative  Respiratory: No shortness of breath, dyspnea on exertion, cough, or hemoptysis  Cardiovascular: negative  Gastrointestinal: negative  Genitourinary: negative  Musculoskeletal: negative  Neurologic: negative  Psychiatric: negative        PHYSICAL EXAM:      Skin exam performed as follows: Type 6 skin. Mood appropriate  Alert and Oriented X 3. Well developed, well nourished in no distress.  General appearance: Normal  Head including face: Normal  Eyes: conjunctiva and lids: Normal  Mouth: Lips, teeth, gums: Normal  Neck: Normal  Chest-breast/axillae: Normal  Back: Normal  Spleen and liver: Normal  Cardiovascular: Exam of peripheral vascular system by observation for swelling, varicosities, edema: Normal  Genitalia: groin, buttocks: Normal  Extremities: digits/nails (clubbing): Normal  Eccrine and Apocrine glands: Normal  Right upper extremity: Normal  Left upper extremity: Normal  Right lower extremity: Normal  Left lower extremity: Normal  Skin: Scalp and body hair: See below    1. Smooth mobile subcutaneous nodule on the left clavicle 10 mm in size; on mid lower abdomen x 2 10 mm in size each    ASSESSMENT/PLAN:     1. Epidermal cysts - advised on diagnoses and treatment options. Bothersome to patient. Discussed excision with Etelvina Faustin or Dr Gaona and he will schedule for this.         Follow-up:  excision/PRN  CC:   Scribed By: Mami Umana, MS, PALATASHA      Again, thank you for allowing me to participate in the care of your patient.        Sincerely,        Mami Umana PA-C

## 2018-04-10 NOTE — MR AVS SNAPSHOT
"              After Visit Summary   4/10/2018    Sea Solis    MRN: [id number]           Patient Information     Date Of Birth          1980        Visit Information        Provider Department      4/10/2018 11:45 AM Mami Umana PA-C [facility]        Today's Diagnoses     Epidermal cyst    -  1       Follow-ups after your visit        Your next 10 appointments already scheduled     Apr 11, 2018  9:20 AM CDT   New Visit with Orlin Atkins,    [facility] [place] ([facility] [place])    [address]  [place] [address]   [phone]            Apr 30, 2018 10:00 AM CDT   Return Visit with Aditi Faustin PA-C   [facility]    [address] [address] [phone]              Who to contact     If you have questions or need follow up information about today's clinic visit or your schedule please contact [facility] directly at [phone].  Normal or non-critical lab and imaging results will be communicated to you by MyChart, letter or phone within 4 business days after the clinic has received the results. If you do not hear from us within 7 days, please contact the clinic through MyChart or phone. If you have a critical or abnormal lab result, we will notify you by phone as soon as possible.  Submit refill requests through [employer] or call your pharmacy and they will forward the refill request to us. Please allow 3 business days for your refill to be completed.          Additional Information About Your Visit        MyChart Information     [employer] lets you send messages to your doctor, view your test results, renew your prescriptions, schedule appointments and more. To sign up, go to [email or web address] . Click on \"Log in\" on the left side of the screen, which will take you to the " "Welcome page. Then click on \"Sign up Now\" on the right side of the page.     You will be asked to enter the access code listed below, as well as some personal information. Please follow the directions to create your username and password.     Your access code is: [email or web address]  Expires: 2018 11:23 AM     Your access code will  in 90 days. If you need help or a new code, please call your [facility] clinic or [phone].        Care EveryWhere ID     This is your Care EveryWhere ID. This could be used by other organizations to access your [facility] medical records  [id number]        Your Vitals Were     Pulse Pulse Oximetry                73 99%           Blood Pressure from Last 3 Encounters:   04/10/18 115/64   18 107/69   17 126/74    Weight from Last 3 Encounters:   18 71.6 kg (157 lb 12.8 oz)   17 69.4 kg (153 lb)   17 70.7 kg (155 lb 12.8 oz)              Today, you had the following     No orders found for display       Primary Care Provider Office Phone # Fax #    [facility] [phone]       [address] [facility] [address]        Equal Access to Services     MELANIE SANTIAGO AH: Hadii aad ku hadasho Soomaali, waaxda luqadaha, qaybta kaalmada adeegyada, waxay idiin hayaan lebron rowe . So [facility] [phone].    ATENCIÓN: Si habla español, tiene a mathias disposición servicios gratuitos de asistencia lingüística. Llame al [phone].    We comply with applicable federal civil rights laws and [place] laws. We do not discriminate on the basis of race, color, national origin, age, disability, sex, sexual orientation, or gender identity.            Thank you!     Thank you for choosing [facility]  for your care. Our goal is always to provide you with excellent care. Hearing back from our patients is one way we can continue to improve our services. Please take a few minutes to complete the written survey that you " may receive in the mail after your visit with us. Thank you!             Your Updated Medication List - Protect others around you: Learn how to safely use, store and throw away your medicines at www.disposemymeds.org.          This list is accurate as of 4/10/18 11:59 AM.  Always use your most recent med list.                   Brand Name Dispense Instructions for use Diagnosis    sildenafil 50 MG tablet    VIAGRA    6 tablet    Take 1 tablet (50 mg) by mouth daily as needed 30 min to 4 hrs before sex. Do not use with nitroglycerin, terazosin or doxazosin.    Erectile dysfunction, unspecified erectile dysfunction type

## 2018-04-10 NOTE — PROGRESS NOTES
HPI:   Sea Solis is a 37 year old male who presents for evaluation of raised spots beneath the skin  chief complaint  Location: one area on the left shoulder and two areas on the lower abdomen   Condition present for:  About 1 year or more.   Previous treatments include: none    Review Of Systems  Eyes: negative  Ears/Nose/Throat: negative  Respiratory: No shortness of breath, dyspnea on exertion, cough, or hemoptysis  Cardiovascular: negative  Gastrointestinal: negative  Genitourinary: negative  Musculoskeletal: negative  Neurologic: negative  Psychiatric: negative        PHYSICAL EXAM:      Skin exam performed as follows: Type 6 skin. Mood appropriate  Alert and Oriented X 3. Well developed, well nourished in no distress.  General appearance: Normal  Head including face: Normal  Eyes: conjunctiva and lids: Normal  Mouth: Lips, teeth, gums: Normal  Neck: Normal  Chest-breast/axillae: Normal  Back: Normal  Spleen and liver: Normal  Cardiovascular: Exam of peripheral vascular system by observation for swelling, varicosities, edema: Normal  Genitalia: groin, buttocks: Normal  Extremities: digits/nails (clubbing): Normal  Eccrine and Apocrine glands: Normal  Right upper extremity: Normal  Left upper extremity: Normal  Right lower extremity: Normal  Left lower extremity: Normal  Skin: Scalp and body hair: See below    1. Smooth mobile subcutaneous nodule on the left clavicle 10 mm in size; on mid lower abdomen x 2 10 mm in size each    ASSESSMENT/PLAN:     1. Epidermal cysts - advised on diagnoses and treatment options. Bothersome to patient. Discussed excision with Etelvina Faustin or Dr Gaona and he will schedule for this.         Follow-up: excision/PRN  CC:   Scribed By: Mami Umana, MS, PA-C

## 2018-04-11 ENCOUNTER — RADIANT APPOINTMENT (OUTPATIENT)
Dept: GENERAL RADIOLOGY | Facility: CLINIC | Age: 38
End: 2018-04-11
Attending: PEDIATRICS
Payer: COMMERCIAL

## 2018-04-11 ENCOUNTER — OFFICE VISIT (OUTPATIENT)
Dept: ORTHOPEDICS | Facility: CLINIC | Age: 38
End: 2018-04-11
Payer: COMMERCIAL

## 2018-04-11 VITALS
BODY MASS INDEX: 24.91 KG/M2 | SYSTOLIC BLOOD PRESSURE: 110 MMHG | HEIGHT: 66 IN | WEIGHT: 155 LBS | DIASTOLIC BLOOD PRESSURE: 72 MMHG

## 2018-04-11 DIAGNOSIS — M79.644 PAIN OF RIGHT MIDDLE FINGER: Primary | ICD-10-CM

## 2018-04-11 DIAGNOSIS — M79.644 PAIN OF RIGHT MIDDLE FINGER: ICD-10-CM

## 2018-04-11 PROCEDURE — 73140 X-RAY EXAM OF FINGER(S): CPT | Mod: RT | Performed by: PEDIATRICS

## 2018-04-11 PROCEDURE — 99243 OFF/OP CNSLTJ NEW/EST LOW 30: CPT | Performed by: PEDIATRICS

## 2018-04-11 RX ORDER — IBUPROFEN 800 MG/1
800 TABLET, FILM COATED ORAL EVERY 8 HOURS PRN
Qty: 60 TABLET | Refills: 1 | Status: SHIPPED | OUTPATIENT
Start: 2018-04-11 | End: 2020-11-27

## 2018-04-11 NOTE — LETTER
4/11/2018         RE: Sea Solis  [address]  [facility] [place] [address]        Dear Colleague,    Thank you for referring your patient, Sea Solis, to the [facility] [place]. Please see a copy of my visit note below.    Sports Medicine Clinic Visit    PCP: Clinic, [facility] [place]    Sea Solis is a 37 year old male who is seen  in consultation at the request of  Riki Nation*  GALILEO presenting with a right long finger injury.  Patient smashed his finger in the wall about 6 months ago.  He has continued to have pain over his PIP joint.  Was seen by primary care a few weeks ago and did have x rays.  Patient is right hand dominant.      **  Patient felt that Hand Therapy right long finger was steadily improving initially. Long finger pain has waxed and waned. Pain at rest. Denies cuts or bleeding at injury.     Injury: smashed finger     Location of Pain: right long finger PIP joint   Duration of Pain: 6 month(s)  Rating of Pain at worst: 5/10  Rating of Pain Currently: 5/10  Symptoms are better with: Nothing  Symptoms are worse with: carrying, lifting   Additional Features:   Positive:    Negative: swelling, bruising, popping, grinding, catching, locking, instability, paresthesias, numbness, weakness, pain in other joints and systemic symptoms  Other evaluation and/or treatments so far consists of: x rays   Prior History of related problems: denies     Social History: trevor munguia     Review of Systems  Musculoskeletal: as above  Remainder of review of systems is negative including constitutional, CV, pulmonary, GI, Skin and Neurologic except as noted in HPI or medical history.    This document serves as a record of the services and decisions personally performed and made by Orlin Atkins DO, CAQ. It was created on his behalf by John Bianchi, a trained medical scribe. The creation of this document is based the provider's statements to  "the medical scribe.  John Bianchi April 11, 2018 10:00 AM      Past Medical History:   Diagnosis Date     Hyperlipidemia 3/21/2018     PSHx: noncontributory  Fam hx: noncontributory    Social History     Social History     Marital status:      Spouse name: N/A     Number of children: N/A     Years of education: N/A     Occupational History     Not on file.     Social History Main Topics     Smoking status: Former Smoker     Smokeless tobacco: Never Used     Alcohol use 0.0 oz/week     0 Standard drinks or equivalent per week      Comment: 2-3x week     Drug use: Yes     Special: Marijuana      Comment: Occasional     Sexual activity: Yes     Partners: Female     Other Topics Concern     Not on file     Social History Narrative       Objective  /72  Ht 5' 6\" (1.676 m)  Wt 155 lb (70.3 kg)  BMI 25.02 kg/m2    GENERAL APPEARANCE: healthy, alert and no distress   GAIT: NORMAL  SKIN: no suspicious lesions or rashes  NEURO: Normal strength and tone, mentation intact and speech normal  PSYCH:  mentation appears normal and affect normal/bright  HEENT: no scleral icterus  CV: no extremity edema   RESP: nonlabored breathing     Exam:  Hand/wrist (right):    Inspection:  No deformity noted. mild  swelling around long finger PIP joint. No ecchymosis      Motion:  Forearm pronation grossly full , forearm supination grossly full .  Wrist flexion grossly full   Wrist extension grossly full   Wrist radial deviation grossly full   Wrist ulnar deviation grossly full   Digit motion grossly full active composite flexion with tightness in the long finger at PIP joint. Full extension long finger.   Pain with passive end of extension of the long finger. Tight with passive flexion of the long finger at the PIP joint.     Strength:  Resisted extension, some pain along the ulnar and radial aspect of the long finger at PIP joint   Flexion grossly full, no pain    Sensation:  Grossly intact    Radial pulses normal, +2/4, " capillary refill brisk.    Palpation:  Tender PIP joint of the long finger--more radial aspect  Nontender throughout remainder of hand and wrist.     No pain or laxity at long finger PIP joint with stressing collaterals.    Skin:       well perfused       capillary refill brisk    Radiology:  Visualized radiographs of right long finger obtained today, and reviewed the images with the patient.  Impression: Three views of the right long finger demonstrate once again the mottled lucency in the distal aspect of the proximal phalanx, similar to prior films from 3/19/18. Otherwise, there has been no significant interval change.   ** may be residua of prior injury    Results for orders placed or performed in visit on 03/19/18   XR Finger Right G/E 2 Views    Narrative    XR FINGER RT G/E 2 VW 3/19/2018 10:58 AM    COMPARISON: None.    HISTORY: Right finger pain.      Impression    IMPRESSION: Mottled lucency is seen in the right third proximal  phalangeal head with mild surrounding soft tissue swelling. Findings  are concerning for infection or inflammatory arthropathy.  Alternatively, may represent osteopenia related to healing  nondisplaced fracture, history.    SHAWANDA LING MD     Lab  Office Visit on 03/19/2018   Component Date Value Ref Range Status     WBC 03/19/2018 4.8  4.0 - 11.0 10e9/L Final     RBC Count 03/19/2018 5.08  4.4 - 5.9 10e12/L Final     Hemoglobin 03/19/2018 15.2  13.3 - 17.7 g/dL Final     Hematocrit 03/19/2018 45.3  40.0 - 53.0 % Final     MCV 03/19/2018 89  78 - 100 fl Final     MCH 03/19/2018 29.9  26.5 - 33.0 pg Final     MCHC 03/19/2018 33.6  31.5 - 36.5 g/dL Final     RDW 03/19/2018 14.0  10.0 - 15.0 % Final     Platelet Count 03/19/2018 200  150 - 450 10e9/L Final     Diff Method 03/19/2018 Automated Method   Final     % Neutrophils 03/19/2018 42.2  % Final     % Lymphocytes 03/19/2018 43.5  % Final     % Monocytes 03/19/2018 8.5  % Final     % Eosinophils 03/19/2018 4.8  % Final     %  Basophils 03/19/2018 1.0  % Final     Absolute Neutrophil 03/19/2018 2.0  1.6 - 8.3 10e9/L Final     Absolute Lymphocytes 03/19/2018 2.1  0.8 - 5.3 10e9/L Final     Absolute Monocytes 03/19/2018 0.4  0.0 - 1.3 10e9/L Final     Absolute Eosinophils 03/19/2018 0.2  0.0 - 0.7 10e9/L Final     Absolute Basophils 03/19/2018 0.1  0.0 - 0.2 10e9/L Final     Sodium 03/19/2018 140  133 - 144 mmol/L Final     Potassium 03/19/2018 3.8  3.4 - 5.3 mmol/L Final     Chloride 03/19/2018 104  94 - 109 mmol/L Final     Carbon Dioxide 03/19/2018 25  20 - 32 mmol/L Final     Anion Gap 03/19/2018 11  3 - 14 mmol/L Final     Glucose 03/19/2018 89  70 - 99 mg/dL Final     Urea Nitrogen 03/19/2018 13  7 - 30 mg/dL Final     Creatinine 03/19/2018 0.92  0.66 - 1.25 mg/dL Final     GFR Estimate 03/19/2018 >90  >60 mL/min/1.7m2 Final     GFR Estimate If Black 03/19/2018 >90  >60 mL/min/1.7m2 Final     Calcium 03/19/2018 8.6  8.5 - 10.1 mg/dL Final     Bilirubin Total 03/19/2018 0.4  0.2 - 1.3 mg/dL Final     Albumin 03/19/2018 4.0  3.4 - 5.0 g/dL Final     Protein Total 03/19/2018 7.6  6.8 - 8.8 g/dL Final     Alkaline Phosphatase 03/19/2018 62  40 - 150 U/L Final     ALT 03/19/2018 31  0 - 70 U/L Final     AST 03/19/2018 19  0 - 45 U/L Final     CRP Inflammation 03/19/2018 <2.9  0.0 - 8.0 mg/L Final     Sed Rate 03/19/2018 5  0 - 15 mm/h Final             Assessment:  1. Pain of right middle finger         Plan:  Discussed the assessment with the patient. He is most focused on stiffness, some swelling. Notes pain is not too bad overall, slowly improving. Less likely inflammatory arthropathy or infectious.    Plain films of the area reviewed with the patient. Suspect residua of prior injury; may have had a fx. Other considerations are as noted in report, though he has had some overall improvement by history, albeit slow.    Discussed:  *Symptom Treatment  *Activity Modification   *Support - splint, micheal taping   *Imaging - could consider MRI  or Bone Scan   *Rehab - Hand Therapy   *Medication - prescription antiinflammatory     Topical Treatments: Ice prn   Over the counter medication: Patient's preferred OTC medication as directed on packaging.  Prescription Medication as directed: Ibuprofen, may take for up to two weeks routinely . Take with food.   Discussed NSAIDs. He desired rx for ibuprofen rather than OTC; rx placed.   Pertinent potential adverse effect profile of prescribed medication(s) was discussed with the patient, who expressed understanding.  Activity Modification: as discussed   Medical Equipment: discussed potential use of Alumafoam splint and buddy taping techniques; may use for comfort   Discussed imaging; he does not desire, prefers to continue with monitoring. As long as it continues to improve, may continue to monitor. If concerns, then possibly imaging.  Follow up: in ~1 month if not improving; otherwise, as needed   We discussed potentially concerning signs and symptoms related to the condition(s) listed above, including increase in pain, changing pain, and the patient was instructed to seek appropriate medical care if noted. All questions answered to patient's satisfaction. The patient expressed understanding of the plan.       Orlin Atkins DO, CAQ    CC: Riki Aguiar         Disclaimer: This note consists of symbols derived from keyboarding, dictation and/or voice recognition software. As a result, there may be errors in the script that have gone undetected. Please consider this when interpreting information found in this chart.    The information in this document, created by the medical scribe for me, accurately reflects the services I personally performed and the decisions made by me. I have reviewed and approved this document for accuracy.   Orlin Atkins DO, CAQ     Again, thank you for allowing me to participate in the care of your patient.        Sincerely,        Orlin Atkins DO

## 2018-04-11 NOTE — MR AVS SNAPSHOT
"              After Visit Summary   4/11/2018    Sea Solis    MRN: [id number]           Patient Information     Date Of Birth          1980        Visit Information        Provider Department      4/11/2018 9:20 AM Orlin Atkins,  [facility]        Today's Diagnoses     Pain of right middle finger    -  1      Care Instructions    Prescribed Ibuprofen     May use splint and micheal tape           Follow-ups after your visit        Follow-up notes from your care team     Return in about 4 weeks (around 5/9/2018), or if symptoms worsen or fail to improve.      Your next 10 appointments already scheduled     Apr 30, 2018 10:00 AM CDT   [facility]    [address] [place] [id number] [phone]              Who to contact     If you have questions or need follow up information about today's clinic visit or your schedule please contact [facility] directly at [phone].  Normal or non-critical lab and imaging results will be communicated to you by HCIhart, letter or phone within 4 business days after the clinic has received the results. If you do not hear from us within 7 days, please contact the clinic through HCIhart or phone. If you have a critical or abnormal lab result, we will notify you by phone as soon as possible.  Submit refill requests through One Moja or call your pharmacy and they will forward the refill request to us. Please allow 3 business days for your refill to be completed.          Additional Information About Your Visit        MyChart Information     One Moja lets you send messages to your doctor, view your test results, renew your prescriptions, schedule appointments and more. To sign up, go to [email or web address] . Click on \"Log in\" on the left side of the screen, which will take you " "to the Welcome page. Then click on \"Sign up Now\" on the right side of the page.     You will be asked to enter the access code listed below, as well as some personal information. Please follow the directions to create your username and password.     Your access code is: [email or web address]  Expires: 2018 11:23 AM     Your access code will  in 90 days. If you need help or a new code, please call your [facility] clinic or [phone].        Care EveryWhere ID     This is your Care EveryWhere ID. This could be used by other organizations to access your [facility] medical records  [id number]        Your Vitals Were     Height BMI (Body Mass Index)                5' 6\" (1.676 m) 25.02 kg/m2           Blood Pressure from Last 3 Encounters:   18 110/72   04/10/18 115/64   18 107/69    Weight from Last 3 Encounters:   18 155 lb (70.3 kg)   18 157 lb 12.8 oz (71.6 kg)   17 153 lb (69.4 kg)                 Today's Medication Changes          These changes are accurate as of 18  4:58 PM.  If you have any questions, ask your nurse or doctor.               Start taking these medicines.        Dose/Directions    ibuprofen 800 MG tablet   Commonly known as:  ADVIL/MOTRIN   Used for:  Pain of right middle finger   Started by:  Orlin Atkins, DO        Dose:  800 mg   Take 1 tablet (800 mg) by mouth every 8 hours as needed for moderate pain   Quantity:  60 tablet   Refills:  1            Where to get your medicines      These medications were sent to [facility] - [place], [place] - [address] [facility]  [address] [facility] [address]    Hours:  24-hours Phone:  [phone]     ibuprofen 800 MG tablet                Primary Care Provider Office Phone # Fax #    [facility] [phone] [address] [address]        Equal Access to Services     MELANIE SANTIAGO AH: Annika marcos " Alanis, deuceda luchiragadaha, alannahta kalu merchant, martha diana mariellagreg enriquegladis phill. So [facility] [phone].    ATENCIÓN: Si manish garduno, tiene a mathias disposición servicios gratuitos de asistencia lingüística. Charity al [phone].    We comply with applicable federal civil rights laws and [place] laws. We do not discriminate on the basis of race, color, national origin, age, disability, sex, sexual orientation, or gender identity.            Thank you!     Thank you for choosing [facility]  for your care. Our goal is always to provide you with excellent care. Hearing back from our patients is one way we can continue to improve our services. Please take a few minutes to complete the written survey that you may receive in the mail after your visit with us. Thank you!             Your Updated Medication List - Protect others around you: Learn how to safely use, store and throw away your medicines at www.disposemymeds.org.          This list is accurate as of 4/11/18  4:58 PM.  Always use your most recent med list.                   Brand Name Dispense Instructions for use Diagnosis    ibuprofen 800 MG tablet    ADVIL/MOTRIN    60 tablet    Take 1 tablet (800 mg) by mouth every 8 hours as needed for moderate pain    Pain of right middle finger       sildenafil 50 MG tablet    VIAGRA    6 tablet    Take 1 tablet (50 mg) by mouth daily as needed 30 min to 4 hrs before sex. Do not use with nitroglycerin, terazosin or doxazosin.    Erectile dysfunction, unspecified erectile dysfunction type

## 2018-04-11 NOTE — PROGRESS NOTES
Sports Medicine Clinic Visit    PCP: Rosa Davidson    Sea Solis is a 37 year old male who is seen  in consultation at the request of  Riki Nation*  GALILEO presenting with a right long finger injury.  Patient smashed his finger in the wall about 6 months ago.  He has continued to have pain over his PIP joint.  Was seen by primary care a few weeks ago and did have x rays.  Patient is right hand dominant.      **  Patient felt that Hand Therapy right long finger was steadily improving initially. Long finger pain has waxed and waned. Pain at rest. Denies cuts or bleeding at injury.     Injury: smashed finger     Location of Pain: right long finger PIP joint   Duration of Pain: 6 month(s)  Rating of Pain at worst: 5/10  Rating of Pain Currently: 5/10  Symptoms are better with: Nothing  Symptoms are worse with: carrying, lifting   Additional Features:   Positive:    Negative: swelling, bruising, popping, grinding, catching, locking, instability, paresthesias, numbness, weakness, pain in other joints and systemic symptoms  Other evaluation and/or treatments so far consists of: x rays   Prior History of related problems: denies     Social History: trevor munguia     Review of Systems  Musculoskeletal: as above  Remainder of review of systems is negative including constitutional, CV, pulmonary, GI, Skin and Neurologic except as noted in HPI or medical history.    This document serves as a record of the services and decisions personally performed and made by Orlin Atkins DO, CAQ. It was created on his behalf by John Bianchi, a trained medical scribe. The creation of this document is based the provider's statements to the medical scribe.  John Bianchi April 11, 2018 10:00 AM      Past Medical History:   Diagnosis Date     Hyperlipidemia 3/21/2018     PSHx: noncontributory  Fam hx: noncontributory    Social History     Social History     Marital status:      Spouse name: N/A      "Number of children: N/A     Years of education: N/A     Occupational History     Not on file.     Social History Main Topics     Smoking status: Former Smoker     Smokeless tobacco: Never Used     Alcohol use 0.0 oz/week     0 Standard drinks or equivalent per week      Comment: 2-3x week     Drug use: Yes     Special: Marijuana      Comment: Occasional     Sexual activity: Yes     Partners: Female     Other Topics Concern     Not on file     Social History Narrative       Objective  /72  Ht 5' 6\" (1.676 m)  Wt 155 lb (70.3 kg)  BMI 25.02 kg/m2    GENERAL APPEARANCE: healthy, alert and no distress   GAIT: NORMAL  SKIN: no suspicious lesions or rashes  NEURO: Normal strength and tone, mentation intact and speech normal  PSYCH:  mentation appears normal and affect normal/bright  HEENT: no scleral icterus  CV: no extremity edema   RESP: nonlabored breathing     Exam:  Hand/wrist (right):    Inspection:  No deformity noted. mild  swelling around long finger PIP joint. No ecchymosis      Motion:  Forearm pronation grossly full , forearm supination grossly full .  Wrist flexion grossly full   Wrist extension grossly full   Wrist radial deviation grossly full   Wrist ulnar deviation grossly full   Digit motion grossly full active composite flexion with tightness in the long finger at PIP joint. Full extension long finger.   Pain with passive end of extension of the long finger. Tight with passive flexion of the long finger at the PIP joint.     Strength:  Resisted extension, some pain along the ulnar and radial aspect of the long finger at PIP joint   Flexion grossly full, no pain    Sensation:  Grossly intact    Radial pulses normal, +2/4, capillary refill brisk.    Palpation:  Tender PIP joint of the long finger--more radial aspect  Nontender throughout remainder of hand and wrist.     No pain or laxity at long finger PIP joint with stressing collaterals.    Skin:       well perfused       capillary refill " brisk    Radiology:  Visualized radiographs of right long finger obtained today, and reviewed the images with the patient.  Impression: Three views of the right long finger demonstrate once again the mottled lucency in the distal aspect of the proximal phalanx, similar to prior films from 3/19/18. Otherwise, there has been no significant interval change.   ** may be residua of prior injury    Results for orders placed or performed in visit on 03/19/18   XR Finger Right G/E 2 Views    Narrative    XR FINGER RT G/E 2 VW 3/19/2018 10:58 AM    COMPARISON: None.    HISTORY: Right finger pain.      Impression    IMPRESSION: Mottled lucency is seen in the right third proximal  phalangeal head with mild surrounding soft tissue swelling. Findings  are concerning for infection or inflammatory arthropathy.  Alternatively, may represent osteopenia related to healing  nondisplaced fracture, history.    SHAWANDA LING MD     Lab  Office Visit on 03/19/2018   Component Date Value Ref Range Status     WBC 03/19/2018 4.8  4.0 - 11.0 10e9/L Final     RBC Count 03/19/2018 5.08  4.4 - 5.9 10e12/L Final     Hemoglobin 03/19/2018 15.2  13.3 - 17.7 g/dL Final     Hematocrit 03/19/2018 45.3  40.0 - 53.0 % Final     MCV 03/19/2018 89  78 - 100 fl Final     MCH 03/19/2018 29.9  26.5 - 33.0 pg Final     MCHC 03/19/2018 33.6  31.5 - 36.5 g/dL Final     RDW 03/19/2018 14.0  10.0 - 15.0 % Final     Platelet Count 03/19/2018 200  150 - 450 10e9/L Final     Diff Method 03/19/2018 Automated Method   Final     % Neutrophils 03/19/2018 42.2  % Final     % Lymphocytes 03/19/2018 43.5  % Final     % Monocytes 03/19/2018 8.5  % Final     % Eosinophils 03/19/2018 4.8  % Final     % Basophils 03/19/2018 1.0  % Final     Absolute Neutrophil 03/19/2018 2.0  1.6 - 8.3 10e9/L Final     Absolute Lymphocytes 03/19/2018 2.1  0.8 - 5.3 10e9/L Final     Absolute Monocytes 03/19/2018 0.4  0.0 - 1.3 10e9/L Final     Absolute Eosinophils 03/19/2018 0.2  0.0 - 0.7  10e9/L Final     Absolute Basophils 03/19/2018 0.1  0.0 - 0.2 10e9/L Final     Sodium 03/19/2018 140  133 - 144 mmol/L Final     Potassium 03/19/2018 3.8  3.4 - 5.3 mmol/L Final     Chloride 03/19/2018 104  94 - 109 mmol/L Final     Carbon Dioxide 03/19/2018 25  20 - 32 mmol/L Final     Anion Gap 03/19/2018 11  3 - 14 mmol/L Final     Glucose 03/19/2018 89  70 - 99 mg/dL Final     Urea Nitrogen 03/19/2018 13  7 - 30 mg/dL Final     Creatinine 03/19/2018 0.92  0.66 - 1.25 mg/dL Final     GFR Estimate 03/19/2018 >90  >60 mL/min/1.7m2 Final     GFR Estimate If Black 03/19/2018 >90  >60 mL/min/1.7m2 Final     Calcium 03/19/2018 8.6  8.5 - 10.1 mg/dL Final     Bilirubin Total 03/19/2018 0.4  0.2 - 1.3 mg/dL Final     Albumin 03/19/2018 4.0  3.4 - 5.0 g/dL Final     Protein Total 03/19/2018 7.6  6.8 - 8.8 g/dL Final     Alkaline Phosphatase 03/19/2018 62  40 - 150 U/L Final     ALT 03/19/2018 31  0 - 70 U/L Final     AST 03/19/2018 19  0 - 45 U/L Final     CRP Inflammation 03/19/2018 <2.9  0.0 - 8.0 mg/L Final     Sed Rate 03/19/2018 5  0 - 15 mm/h Final             Assessment:  1. Pain of right middle finger         Plan:  Discussed the assessment with the patient. He is most focused on stiffness, some swelling. Notes pain is not too bad overall, slowly improving. Less likely inflammatory arthropathy or infectious.    Plain films of the area reviewed with the patient. Suspect residua of prior injury; may have had a fx. Other considerations are as noted in report, though he has had some overall improvement by history, albeit slow.    Discussed:  *Symptom Treatment  *Activity Modification   *Support - splint, micheal taping   *Imaging - could consider MRI or Bone Scan   *Rehab - Hand Therapy   *Medication - prescription antiinflammatory     Topical Treatments: Ice prn   Over the counter medication: Patient's preferred OTC medication as directed on packaging.  Prescription Medication as directed: Ibuprofen, may take for up to  two weeks routinely . Take with food.   Discussed NSAIDs. He desired rx for ibuprofen rather than OTC; rx placed.   Pertinent potential adverse effect profile of prescribed medication(s) was discussed with the patient, who expressed understanding.  Activity Modification: as discussed   Medical Equipment: discussed potential use of Alumafoam splint and buddy taping techniques; may use for comfort   Discussed imaging; he does not desire, prefers to continue with monitoring. As long as it continues to improve, may continue to monitor. If concerns, then possibly imaging.  Follow up: in ~1 month if not improving; otherwise, as needed   We discussed potentially concerning signs and symptoms related to the condition(s) listed above, including increase in pain, changing pain, and the patient was instructed to seek appropriate medical care if noted. All questions answered to patient's satisfaction. The patient expressed understanding of the plan.       Orlin Atkins DO, CAQ    CC: Riki Aguiar         Disclaimer: This note consists of symbols derived from keyboarding, dictation and/or voice recognition software. As a result, there may be errors in the script that have gone undetected. Please consider this when interpreting information found in this chart.    The information in this document, created by the medical scribe for me, accurately reflects the services I personally performed and the decisions made by me. I have reviewed and approved this document for accuracy.   Orlin Atkins DO, CAQ

## 2018-05-16 ENCOUNTER — OFFICE VISIT (OUTPATIENT)
Dept: DERMATOLOGY | Facility: CLINIC | Age: 38
End: 2018-05-16
Payer: COMMERCIAL

## 2018-05-16 VITALS — DIASTOLIC BLOOD PRESSURE: 67 MMHG | OXYGEN SATURATION: 99 % | SYSTOLIC BLOOD PRESSURE: 118 MMHG | HEART RATE: 74 BPM

## 2018-05-16 DIAGNOSIS — L72.0 EPIDERMAL CYST: Primary | ICD-10-CM

## 2018-05-16 PROCEDURE — 88305 TISSUE EXAM BY PATHOLOGIST: CPT | Mod: TC | Performed by: PHYSICIAN ASSISTANT

## 2018-05-16 PROCEDURE — 11401 EXC TR-EXT B9+MARG 0.6-1 CM: CPT | Performed by: PHYSICIAN ASSISTANT

## 2018-05-16 NOTE — LETTER
5/16/2018         RE: Sea Solis  [address]  [place] [address]        Dear Colleague,    [facility] you [facility], Sea Solis[facility]. Please see a copy of my visit note below.    HPI:  Sea Solis is a 37 year old year old male patient here today for cyst removal on chest   Duration: 1 year  Symptoms:  Growing, irritated     Previous treatments: none     Alleviating/aggravating factors: none    Associated symptoms: none  Additional findings:none  Patient has no other skin complaints today.  Remainder of the HPI, Meds, PMH, Allergies, FH, and SH was reviewed in chart.    Past Medical History:   Diagnosis Date     Hyperlipidemia 3/21/2018       History reviewed. No pertinent surgical history.     History reviewed. No pertinent family history.    Social History     Social History     Marital status:      Spouse name: N/A     Number of children: N/A     Years of education: N/A     Occupational History     Not on file.     Social History Main Topics     Smoking status: Former Smoker     Smokeless tobacco: Never Used     Alcohol use 0.0 oz/week     0 Standard drinks or equivalent per week      Comment: 2-3x week     Drug use: Yes     Special: Marijuana      Comment: Occasional     Sexual activity: Yes     Partners: Female     Other Topics Concern     Not on file     Social History Narrative       Outpatient Encounter Prescriptions as of 5/16/2018   Medication Sig Dispense Refill     ibuprofen (ADVIL/MOTRIN) 800 MG tablet Take 1 tablet (800 mg) by mouth every 8 hours as needed for moderate pain 60 tablet 1     sildenafil (VIAGRA) 50 MG tablet Take 1 tablet (50 mg) by mouth daily as needed 30 min to 4 hrs before sex. Do not use with nitroglycerin, terazosin or doxazosin. 6 tablet 1     No facility-administered encounter medications on file as of 5/16/2018.        Review Of Systems:    Skin: As above  Eyes:  negative  Ears/Nose/Throat: negative  Respiratory: No shortness of breath, dyspnea on exertion, cough, or hemoptysis  Cardiovascular: negative  Gastrointestinal: negative  Genitourinary: negative  Musculoskeletal: negative  Neurologic: negative  Psychiatric: negative  Hematologic/Lymphatic/Immunologic: negative  Endocrine: negative      Objective:     /67  Pulse 74  SpO2 99%  Eyes: Conjunctivae/lids: Normal   ENT: Lips:  Normal  MSK: Normal  Pulm: Breathing Normal  Neuro/Psych: Orientation: Normal; Mood/Affect: Normal, NAD, WDWN  Following areas examined: face and chest  Findings:    1) WD soft mobile nodule on left supraclavicular 1.0cm    Assessment and Plan:  1) epidermal cyst  EXCISIONAL BIOPSY AND COMPLEX:  After thorough discussion of PGACAC, consent obtained, anesthesia with LEC and prep, the margins of the lesion were identified and an elliptical incision was made encompassing the lesion with 1 mm margin.  The incisions were made through to include the mid-subcutaneous tissue.  The lesion was removed en bloc and submitted for derm pathologic review. Because of the full-thickness nature of the wound and to avoid standing cone deformities, a complex linear repair was planned.  The wound edges were widely undermined until adequate tissue mobility was obtained.  Hemostasis was achieved.  The wound edges were then closed in a layered fashion, using Vicryl for subcutaneous stitches and FAPG sutures for running top stitches.  Postoperative length was 2.5 cm.  Patient will be contacted with result.  EBL minimal; complications none; wound care routine.  The patient was discharged in good condition and will return in one week for wound evaluation.  Follow up in one week for bandage change      Again, thank you for allowing me to participate in the care of your patient.        Sincerely,        Aditi Faustin PA-C

## 2018-05-16 NOTE — PATIENT INSTRUCTIONS
Sutured Wound Care     [facility]: [phone]    [facility]: [phone]          ? No strenuous activity for 48 hours. Resume moderate activity in 48 hours. No heavy exercising until you are seen for follow up in one week.     ? Take Tylenol as needed for discomfort.                         ? Do not drink alcoholic beverages for 48 hours.     ? Keep the pressure bandage in place for 24 hours. If the bandage becomes blood tinged or loose, reinforce it with gauze and tape.        (Refer to the reverse side of this page for management of bleeding).    ? Remove pressure bandage in 24 hours     ? Leave the flat bandage in place until your follow up appointment.    ? Keep the bandage dry. Wash around it carefully.    ? If the tape becomes soiled or starts to come off, reinforce it with additional paper tape.    ? Do not smoke for 3 weeks; smoking is detrimental to wound healing.    ? It is normal to have swelling and bruising around the surgical site. The bruising will fade in approximately 10-14 days. Elevate the area to reduce swelling.    ? Numbness, itchiness and sensitivity to temperature changes can occur after surgery and may take up to 18 months to normalize.      POSSIBLE COMPLICATIONS    BLEEDIN. Leave the bandage in place.  2. Use tightly rolled up gauze or a cloth to apply direct pressure over the bandage for 20   minutes.  3. Reapply pressure for an additional 20 minutes if necessary  4. Call the office or go to the nearest emergency room if pressure fails to stop the bleeding.  5. Use additional gauze and tape to maintain pressure once the bleeding has stopped.        PAIN:    1. Post operative pain should slowly get better, never worse.  2. A severe increase in pain may indicate a problem. Call the office if this occurs.    In case of emergency phone: [phone]

## 2018-05-16 NOTE — PROGRESS NOTES
HPI:  Sea Solis is a 37 year old year old male patient here today for cyst removal on chest   Duration: 1 year  Symptoms:  Growing, irritated     Previous treatments: none     Alleviating/aggravating factors: none    Associated symptoms: none  Additional findings:none  Patient has no other skin complaints today.  Remainder of the HPI, Meds, PMH, Allergies, FH, and SH was reviewed in chart.    Past Medical History:   Diagnosis Date     Hyperlipidemia 3/21/2018       History reviewed. No pertinent surgical history.     History reviewed. No pertinent family history.    Social History     Social History     Marital status:      Spouse name: N/A     Number of children: N/A     Years of education: N/A     Occupational History     Not on file.     Social History Main Topics     Smoking status: Former Smoker     Smokeless tobacco: Never Used     Alcohol use 0.0 oz/week     0 Standard drinks or equivalent per week      Comment: 2-3x week     Drug use: Yes     Special: Marijuana      Comment: Occasional     Sexual activity: Yes     Partners: Female     Other Topics Concern     Not on file     Social History Narrative       Outpatient Encounter Prescriptions as of 5/16/2018   Medication Sig Dispense Refill     ibuprofen (ADVIL/MOTRIN) 800 MG tablet Take 1 tablet (800 mg) by mouth every 8 hours as needed for moderate pain 60 tablet 1     sildenafil (VIAGRA) 50 MG tablet Take 1 tablet (50 mg) by mouth daily as needed 30 min to 4 hrs before sex. Do not use with nitroglycerin, terazosin or doxazosin. 6 tablet 1     No facility-administered encounter medications on file as of 5/16/2018.        Review Of Systems:    Skin: As above  Eyes: negative  Ears/Nose/Throat: negative  Respiratory: No shortness of breath, dyspnea on exertion, cough, or hemoptysis  Cardiovascular: negative  Gastrointestinal: negative  Genitourinary: negative  Musculoskeletal: negative  Neurologic: negative  Psychiatric:  negative  Hematologic/Lymphatic/Immunologic: negative  Endocrine: negative      Objective:     /67  Pulse 74  SpO2 99%  Eyes: Conjunctivae/lids: Normal   ENT: Lips:  Normal  MSK: Normal  Pulm: Breathing Normal  Neuro/Psych: Orientation: Normal; Mood/Affect: Normal, NAD, WDWN  Following areas examined: face and chest  Findings:    1) WD soft mobile nodule on left supraclavicular 1.0cm    Assessment and Plan:  1) epidermal cyst  EXCISIONAL BIOPSY AND COMPLEX:  After thorough discussion of PGACAC, consent obtained, anesthesia with LEC and prep, the margins of the lesion were identified and an elliptical incision was made encompassing the lesion with 1 mm margin.  The incisions were made through to include the mid-subcutaneous tissue.  The lesion was removed en bloc and submitted for derm pathologic review. Because of the full-thickness nature of the wound and to avoid standing cone deformities, a complex linear repair was planned.  The wound edges were widely undermined until adequate tissue mobility was obtained.  Hemostasis was achieved.  The wound edges were then closed in a layered fashion, using Vicryl for subcutaneous stitches and FAPG sutures for running top stitches.  Postoperative length was 2.5 cm.  Patient will be contacted with result.  EBL minimal; complications none; wound care routine.  The patient was discharged in good condition and will return in one week for wound evaluation.  Follow up in one week for bandage change

## 2018-05-21 ENCOUNTER — TELEPHONE (OUTPATIENT)
Dept: DERMATOLOGY | Facility: CLINIC | Age: 38
End: 2018-05-21

## 2018-05-21 LAB — COPATH REPORT: NORMAL

## 2018-05-21 NOTE — TELEPHONE ENCOUNTER
Called and tried leaving voicemail, but subscriber not allowing messages at this time.  MONTANA Correa-BSN  [facility]  [phone]

## 2018-05-21 NOTE — TELEPHONE ENCOUNTER
Notes Recorded by Aditi Fausitn PA-C on 5/21/2018 at 5:52 PM  Surgery of left supraclavicular shows a normal cyst. Benign. Follow up as discussed.

## 2018-05-22 NOTE — TELEPHONE ENCOUNTER
Called and spoke with patient regarding pathology results-normal cyst. Verified f/u appointment date/time, patient voiced understanding.  MONTANA Correa-BSN  [facility]  [phone]

## 2018-05-23 ENCOUNTER — ALLIED HEALTH/NURSE VISIT (OUTPATIENT)
Dept: DERMATOLOGY | Facility: CLINIC | Age: 38
End: 2018-05-23
Payer: COMMERCIAL

## 2018-05-23 DIAGNOSIS — Z48.01 ENCOUNTER FOR CHANGE OR REMOVAL OF SURGICAL WOUND DRESSING: Primary | ICD-10-CM

## 2018-05-23 PROCEDURE — 99207 ZZC NO CHARGE NURSE ONLY: CPT

## 2018-05-23 NOTE — MR AVS SNAPSHOT
After Visit Summary   5/23/2018    Sea Solis    MRN: [id number]           Patient Information     Date Of Birth          1980        Visit Information        Provider Department      5/23/2018 10:20 AM  DERM NURSE [facility]        Care Instructions    WOUND CARE INSTRUCTIONS  for  ONE WEEK AFTER SURGERY          1) Leave flat bandage on your skin for one week after today s bandage change.  2) In one week when you remove the bandage, you may resume your regular skin care routine, including washing with mild soap and water, applying moisturizer, make-up and sunscreen.    3) If there are any open or bleeding areas at the incision/graft site you should begin to cover the area with a bandage daily as follows:    1) Clean and dry the area with plain tap water using a Q-tip or sterile gauze pad.  2) Apply Polysporin or Bacitracin ointment to the open area.  3) Cover the wound with a band-aid or a sterile non-stick gauze pad and micropore paper tape.         SIGNS OF INFECTION  - If you notice any of these signs of infection, call your doctor right away: expanding redness around the wound.  - Yellow or greenish-colored pus or cloudy wound drainage.    - Red streaking spreading from the wound.  - Increased swelling, tenderness, or pain around the wound.   - Fever.    Please remember that yellow and clear drainage from a wound can be normal and related to normal wound healing.  Isolated drainage from a wound without a combination of the above features does not indicate infection.       *Once the bandages are removed, the scar will be red and firm (especially in the lip/chin area). This is normal and will fade in time. It might take 6-12 months for this to happen.     *Massaging the area will help the scar soften and fade quicker. Begin to massage the area one month after the bandages have been removed. To massage apply pressure directly and firmly over the scar with the  fingertips and move in a circular motion. Massage the area for a few minutes several times a day. Continue to massage the site for several months.    *Approximately 6-8 weeks after surgery it is not uncommon to see the formation of  tender pimple-like  bump along the scar. This is normal. As the scar continues to mature and the stitches underneath the skin begin to dissolve, this might occur. Do not pick or squeeze, this will resolve on it s own. Should one break open producing a small amount of drainage, apply Polysporin or Bacitracin ointment a few times a day until the wound is completely healed.    *Numbness in the surgical area is expected. It might take 12-18 months for the feeling to return to normal. During this time sensations of itchiness, tingling and occasional sharp pains might be noted. These feelings are normal and will subside once the nerves have completely healed.         IN CASE OF EMERGENCY: Dr Gaona [phone]     If you were seen in [place] call: [phone]    If you were seen in [place] call: [phone]            Follow-ups after your visit        Who to contact     If you have questions or need follow up information about today's clinic visit or your schedule please contact [facility] directly at [phone].  Normal or non-critical lab and imaging results will be communicated to you by MyChart, letter or phone within 4 business days after the clinic has received the results. If you do not hear from us within 7 days, please contact the clinic through MyChart or phone. If you have a critical or abnormal lab result, we will notify you by phone as soon as possible.  Submit refill requests through [employer] or call your pharmacy and they will forward the refill request to us. Please allow 3 business days for your refill to be completed.          Additional Information About Your Visit        Care EveryWhere ID     This is your Care EveryWhere ID. This could be  used by other organizations to access your [facility] medical records  [id number]         Blood Pressure from Last 3 Encounters:   05/16/18 118/67   04/11/18 110/72   04/10/18 115/64    Weight from Last 3 Encounters:   04/11/18 70.3 kg (155 lb)   03/19/18 71.6 kg (157 lb 12.8 oz)   04/03/17 69.4 kg (153 lb)              Today, you had the following     No orders found for display       Primary Care Provider Office Phone # Fax #    ADELA Mendez [phone] [address] [facility] [address]        Equal Access to Services     ISADORA SANTIAGO : Hadii yuan del cido Sosadiq, waaxda luqadaha, qaybta kaalmada adetongda, martha pollock. So [facility] [phone].    ATENCIÓN: Si habla español, tiene a mathias disposición servicios gratuitos de asistencia lingüística. Llame al [phone].    We comply with applicable federal civil rights laws and [place] laws. We do not discriminate on the basis of race, color, national origin, age, disability, sex, sexual orientation, or gender identity.            Thank you!     Thank you for choosing [facility]  for your care. Our goal is always to provide you with excellent care. Hearing back from our patients is one way we can continue to improve our services. Please take a few minutes to complete the written survey that you may receive in the mail after your visit with us. Thank you!             Your Updated Medication List - Protect others around you: Learn how to safely use, store and throw away your medicines at www.disposemymeds.org.          This list is accurate as of 5/23/18 10:36 AM.  Always use your most recent med list.                   Brand Name Dispense Instructions for use Diagnosis    ibuprofen 800 MG tablet    ADVIL/MOTRIN    60 tablet    Take 1 tablet (800 mg) by mouth every 8 hours as needed for moderate pain    Pain of right middle finger       sildenafil 50 MG tablet    VIAGRA     6 tablet    Take 1 tablet (50 mg) by mouth daily as needed 30 min to 4 hrs before sex. Do not use with nitroglycerin, terazosin or doxazosin.    Erectile dysfunction, unspecified erectile dysfunction type

## 2018-05-23 NOTE — PATIENT INSTRUCTIONS

## 2018-05-24 NOTE — NURSING NOTE
Pt returned to clinic for post surgery 1 week follow up bandage change. Pt has no complaints, denies pain. Bandage removed, area cleansed with normal saline. Site is healing and wound edges approximating well. Reapplied new steri strips and paper tape.    Advised to watch for signs/sx of infection; spreading redness, drainage, odor, fever. Call or report promptly to clinic. Pt given written instructions and informed to rtc as needed. Patient verbalized understanding.     MONTANA Correa-BSN  [facility]  [phone]

## 2018-06-25 ENCOUNTER — OFFICE VISIT (OUTPATIENT)
Dept: DERMATOLOGY | Facility: CLINIC | Age: 38
End: 2018-06-25
Payer: COMMERCIAL

## 2018-06-25 VITALS — HEART RATE: 69 BPM | DIASTOLIC BLOOD PRESSURE: 67 MMHG | OXYGEN SATURATION: 98 % | SYSTOLIC BLOOD PRESSURE: 112 MMHG

## 2018-06-25 DIAGNOSIS — L91.0 KELOID: ICD-10-CM

## 2018-06-25 DIAGNOSIS — D48.5 NEOPLASM OF UNCERTAIN BEHAVIOR OF SKIN: Primary | ICD-10-CM

## 2018-06-25 PROCEDURE — 11900 INJECT SKIN LESIONS </W 7: CPT | Mod: 59 | Performed by: PHYSICIAN ASSISTANT

## 2018-06-25 PROCEDURE — 11402 EXC TR-EXT B9+MARG 1.1-2 CM: CPT | Performed by: PHYSICIAN ASSISTANT

## 2018-06-25 PROCEDURE — 88305 TISSUE EXAM BY PATHOLOGIST: CPT | Mod: TC | Performed by: PHYSICIAN ASSISTANT

## 2018-06-25 NOTE — PATIENT INSTRUCTIONS
Sutured Wound Care     [facility]: [phone]    [facility]: [phone]          ? No strenuous activity for 48 hours. Resume moderate activity in 48 hours. No heavy exercising until you are seen for follow up in one week.     ? Take Tylenol as needed for discomfort.                         ? Do not drink alcoholic beverages for 48 hours.     ? Keep the pressure bandage in place for 24 hours. If the bandage becomes blood tinged or loose, reinforce it with gauze and tape.        (Refer to the reverse side of this page for management of bleeding).    ? Remove pressure bandage in 24 hours     ? Leave the flat bandage in place until your follow up appointment.    ? Keep the bandage dry. Wash around it carefully.    ? If the tape becomes soiled or starts to come off, reinforce it with additional paper tape.    ? Do not smoke for 3 weeks; smoking is detrimental to wound healing.    ? It is normal to have swelling and bruising around the surgical site. The bruising will fade in approximately 10-14 days. Elevate the area to reduce swelling.    ? Numbness, itchiness and sensitivity to temperature changes can occur after surgery and may take up to 18 months to normalize.      POSSIBLE COMPLICATIONS    BLEEDIN. Leave the bandage in place.  2. Use tightly rolled up gauze or a cloth to apply direct pressure over the bandage for 20   minutes.  3. Reapply pressure for an additional 20 minutes if necessary  4. Call the office or go to the nearest emergency room if pressure fails to stop the bleeding.  5. Use additional gauze and tape to maintain pressure once the bleeding has stopped.        PAIN:    1. Post operative pain should slowly get better, never worse.  2. A severe increase in pain may indicate a problem. Call the office if this occurs.    In case of emergency phone:Dr Gaona [phone]

## 2018-06-25 NOTE — LETTER
[address] [address]  Phone: [phone]  Fax: [phone]      June 25, 2018      Sea Solis                                                                                                               [address]  [facility] [address]            To Whom it may concern,    Mr. Sea Solis is not to do any job that requires heaving lifting for the next 48 hours. He may resume light lifting after the 48 hours.      Please let us know if you have any additional questions.      Thank you,        Aditi Faustin PA-C

## 2018-06-25 NOTE — MR AVS SNAPSHOT
After Visit Summary   2018    Sea Solis    MRN: [id number]           Patient Information     Date Of Birth          1980        Visit Information        Provider Department      2018 10:40 AM Aditi Faustin PA-C [facility]        Care Instructions      Sutured Wound Care     [facility]: [phone]    [facility]: [phone]          ? No strenuous activity for 48 hours. Resume moderate activity in 48 hours. No heavy exercising until you are seen for follow up in one week.     ? Take Tylenol as needed for discomfort.                         ? Do not drink alcoholic beverages for 48 hours.     ? Keep the pressure bandage in place for 24 hours. If the bandage becomes blood tinged or loose, reinforce it with gauze and tape.        (Refer to the reverse side of this page for management of bleeding).    ? Remove pressure bandage in 24 hours     ? Leave the flat bandage in place until your follow up appointment.    ? Keep the bandage dry. Wash around it carefully.    ? If the tape becomes soiled or starts to come off, reinforce it with additional paper tape.    ? Do not smoke for 3 weeks; smoking is detrimental to wound healing.    ? It is normal to have swelling and bruising around the surgical site. The bruising will fade in approximately 10-14 days. Elevate the area to reduce swelling.    ? Numbness, itchiness and sensitivity to temperature changes can occur after surgery and may take up to 18 months to normalize.      POSSIBLE COMPLICATIONS    BLEEDIN. Leave the bandage in place.  2. Use tightly rolled up gauze or a cloth to apply direct pressure over the bandage for 20   minutes.  3. Reapply pressure for an additional 20 minutes if necessary  4. Call the office or go to the nearest emergency room if pressure fails to stop the bleeding.  5. Use additional gauze and tape to maintain pressure once the bleeding has  stopped.        PAIN:    1. Post operative pain should slowly get better, never worse.  2. A severe increase in pain may indicate a problem. Call the office if this occurs.    In case of emergency phone:Dr Gaona [phone]            Follow-ups after your visit        Who to contact     If you have questions or need follow up information about today's clinic visit or your schedule please contact [facility] directly at [phone].  Normal or non-critical lab and imaging results will be communicated to you by MyChart, letter or phone within 4 business days after the clinic has received the results. If you do not hear from us within 7 days, please contact the clinic through MyChart or phone. If you have a critical or abnormal lab result, we will notify you by phone as soon as possible.  Submit refill requests through [employer] or call your pharmacy and they will forward the refill request to us. Please allow 3 business days for your refill to be completed.          Additional Information About Your Visit        Care EveryWhere ID     This is your Care EveryWhere ID. This could be used by other organizations to access your [place] medical records  [id number]        Your Vitals Were     Pulse Pulse Oximetry                69 98%           Blood Pressure from Last 3 Encounters:   06/25/18 112/67   05/16/18 118/67   04/11/18 110/72    Weight from Last 3 Encounters:   04/11/18 70.3 kg (155 lb)   03/19/18 71.6 kg (157 lb 12.8 oz)   04/03/17 69.4 kg (153 lb)              Today, you had the following     No orders found for display       Primary Care Provider Office Phone # Fax #    ADELA Mendez [phone] [address] [facility] [address]        Equal Access to Services     MELANIE SANTIAGO : Annika Thapa, jared benton, martha merlos. So [facility] [phone].    ATENCIÓN: Sanjuana hammond  español, tiene a mathias disposición servicios gratuitos de asistencia lingüística. Charity desai [phone].    We comply with applicable federal civil rights laws and [place] laws. We do not discriminate on the basis of race, color, national origin, age, disability, sex, sexual orientation, or gender identity.            Thank you!     Thank you for choosing [facility]  for your care. Our goal is always to provide you with excellent care. Hearing back from our patients is one way we can continue to improve our services. Please take a few minutes to complete the written survey that you may receive in the mail after your visit with us. Thank you!             Your Updated Medication List - Protect others around you: Learn how to safely use, store and throw away your medicines at www.disposemymeds.org.          This list is accurate as of 6/25/18 11:21 AM.  Always use your most recent med list.                   Brand Name Dispense Instructions for use Diagnosis    ibuprofen 800 MG tablet    ADVIL/MOTRIN    60 tablet    Take 1 tablet (800 mg) by mouth every 8 hours as needed for moderate pain    Pain of right middle finger       sildenafil 50 MG tablet    VIAGRA    6 tablet    Take 1 tablet (50 mg) by mouth daily as needed 30 min to 4 hrs before sex. Do not use with nitroglycerin, terazosin or doxazosin.    Erectile dysfunction, unspecified erectile dysfunction type

## 2018-06-25 NOTE — LETTER
6/25/2018         RE: Sea Solis  [address]  [place] [address]        Dear Colleague,    Thank you for referring your patient, Sea Solis, to the [facility]. Please see a copy of my visit note below.    HPI:  Sea Solis is a 37 year old year old male patient here today for cyst removal on suprapubic area   Duration: years?  Symptoms:  Tender and growing     Previous treatments: none     Alleviating/aggravating factors: none    Associated symptoms: none  Additional findings:has some scarring on chest he would like treated today.   Patient has no other skin complaints today.  Remainder of the HPI, Meds, PMH, Allergies, FH, and SH was reviewed in chart.      Past Medical History:   Diagnosis Date     Hyperlipidemia 3/21/2018       No past surgical history on file.     No family history on file.    Social History     Social History     Marital status:      Spouse name: N/A     Number of children: N/A     Years of education: N/A     Occupational History     Not on file.     Social History Main Topics     Smoking status: Former Smoker     Smokeless tobacco: Never Used     Alcohol use 0.0 oz/week     0 Standard drinks or equivalent per week      Comment: 2-3x week     Drug use: Yes     Special: Marijuana      Comment: Occasional     Sexual activity: Yes     Partners: Female     Other Topics Concern     Not on file     Social History Narrative       Outpatient Encounter Prescriptions as of 6/25/2018   Medication Sig Dispense Refill     ibuprofen (ADVIL/MOTRIN) 800 MG tablet Take 1 tablet (800 mg) by mouth every 8 hours as needed for moderate pain 60 tablet 1     sildenafil (VIAGRA) 50 MG tablet Take 1 tablet (50 mg) by mouth daily as needed 30 min to 4 hrs before sex. Do not use with nitroglycerin, terazosin or doxazosin. 6 tablet 1     No facility-administered encounter medications on file as of 6/25/2018.        Review Of Systems:  Skin: As  above  Eyes: negative  Ears/Nose/Throat: negative  Respiratory: No shortness of breath, dyspnea on exertion, cough, or hemoptysis  Cardiovascular: negative  Gastrointestinal: negative  Genitourinary: negative  Musculoskeletal: negative  Neurologic: negative  Psychiatric: negative  Hematologic/Lymphatic/Immunologic: negative  Endocrine: negative      Objective:     /67  Pulse 69  SpO2 98%  Eyes: Conjunctivae/lids: Normal   ENT: Lips:  Normal  MSK: Normal  Cardiovascular: Peripheral edema none  Pulm: Breathing Normal  Neuro/Psych: Orientation: Normal; Mood/Affect: Normal, NAD, WDWN  Following areas examined: face, chest, abdomen, groin  Findings:    1) 1.1cm smooth mobile nodule on mid suprapubic  2) 7 firm wd smooth firm papules on chest    Assessment and Plan:  1) Neoplasm of uncertain behavior 1.2 cm on mid suprapubic   Cyst vs lipoma  EXCISIONAL BIOPSY AND COMPLEX:  After thorough discussion of PGACAC, consent obtained, anesthesia with LEC and prep, the margins of the lesion were identified and an elliptical incision was made encompassing the lesion with 2 mm margin.  The incisions were made through to include the mid-subcutaneous tissue.  The lesion was removed en bloc and submitted for dermpathologic review. Because of the full-thickness nature of the wound and to avoid standing cone deformities, a complex linear repair was planned.  The wound edges were widely undermined until adequate tissue mobility was obtained.  Hemostasis was achieved.  The wound edges were then closed in a layered fashion, using Vicryl for subcutaneous stitches and FAPG sutures for running top stitches.  Postoperative length was 2 cm.  Patient will be contacted with result.  EBL minimal; complications none; wound care routine.  The patient was discharged in good condition and will return in one week for wound evaluation.  2) keloids x 7  Injected 0.5cc of Kenalog 10 into 7 lesions. Disc permanent AE such as hypopigmentation and  atrophy. May need additional treatment. May not be effective.   Lot #:[id number]  Exp: Dec 2019        Follow up in 1 wk for bandage change and  1 month for kenalog injections if needed      Again, thank you for allowing me to participate in the care of your patient.        Sincerely,        Aditi Faustin PA-C

## 2018-06-25 NOTE — PROGRESS NOTES
HPI:  Sea Solis is a 37 year old year old male patient here today for cyst removal on suprapubic area   Duration: years?  Symptoms:  Tender and growing     Previous treatments: none     Alleviating/aggravating factors: none    Associated symptoms: none  Additional findings:has some scarring on chest he would like treated today.   Patient has no other skin complaints today.  Remainder of the HPI, Meds, PMH, Allergies, FH, and SH was reviewed in chart.      Past Medical History:   Diagnosis Date     Hyperlipidemia 3/21/2018       No past surgical history on file.     No family history on file.    Social History     Social History     Marital status:      Spouse name: N/A     Number of children: N/A     Years of education: N/A     Occupational History     Not on file.     Social History Main Topics     Smoking status: Former Smoker     Smokeless tobacco: Never Used     Alcohol use 0.0 oz/week     0 Standard drinks or equivalent per week      Comment: 2-3x week     Drug use: Yes     Special: Marijuana      Comment: Occasional     Sexual activity: Yes     Partners: Female     Other Topics Concern     Not on file     Social History Narrative       Outpatient Encounter Prescriptions as of 6/25/2018   Medication Sig Dispense Refill     ibuprofen (ADVIL/MOTRIN) 800 MG tablet Take 1 tablet (800 mg) by mouth every 8 hours as needed for moderate pain 60 tablet 1     sildenafil (VIAGRA) 50 MG tablet Take 1 tablet (50 mg) by mouth daily as needed 30 min to 4 hrs before sex. Do not use with nitroglycerin, terazosin or doxazosin. 6 tablet 1     No facility-administered encounter medications on file as of 6/25/2018.        Review Of Systems:  Skin: As above  Eyes: negative  Ears/Nose/Throat: negative  Respiratory: No shortness of breath, dyspnea on exertion, cough, or hemoptysis  Cardiovascular: negative  Gastrointestinal: negative  Genitourinary: negative  Musculoskeletal: negative  Neurologic: negative  Psychiatric:  negative  Hematologic/Lymphatic/Immunologic: negative  Endocrine: negative      Objective:     /67  Pulse 69  SpO2 98%  Eyes: Conjunctivae/lids: Normal   ENT: Lips:  Normal  MSK: Normal  Cardiovascular: Peripheral edema none  Pulm: Breathing Normal  Neuro/Psych: Orientation: Normal; Mood/Affect: Normal, NAD, WDWN  Following areas examined: face, chest, abdomen, groin  Findings:    1) 1.1cm smooth mobile nodule on mid suprapubic  2) 7 firm wd smooth firm papules on chest    Assessment and Plan:  1) Neoplasm of uncertain behavior 1.2 cm on mid suprapubic   Cyst vs lipoma  EXCISIONAL BIOPSY AND COMPLEX:  After thorough discussion of PGACAC, consent obtained, anesthesia with LEC and prep, the margins of the lesion were identified and an elliptical incision was made encompassing the lesion with 2 mm margin.  The incisions were made through to include the mid-subcutaneous tissue.  The lesion was removed en bloc and submitted for dermpathologic review. Because of the full-thickness nature of the wound and to avoid standing cone deformities, a complex linear repair was planned.  The wound edges were widely undermined until adequate tissue mobility was obtained.  Hemostasis was achieved.  The wound edges were then closed in a layered fashion, using Vicryl for subcutaneous stitches and FAPG sutures for running top stitches.  Postoperative length was 2 cm.  Patient will be contacted with result.  EBL minimal; complications none; wound care routine.  The patient was discharged in good condition and will return in one week for wound evaluation.  2) keloids x 7  Injected 0.5cc of Kenalog 10 into 7 lesions. Disc permanent AE such as hypopigmentation and atrophy. May need additional treatment. May not be effective.   Lot #:[id number]  Exp: Dec 2019        Follow up in 1 wk for bandage change and  1 month for kenalog injections if needed

## 2018-06-27 LAB — COPATH REPORT: NORMAL

## 2018-06-28 ENCOUNTER — TELEPHONE (OUTPATIENT)
Dept: FAMILY MEDICINE | Facility: CLINIC | Age: 38
End: 2018-06-28

## 2018-06-28 NOTE — TELEPHONE ENCOUNTER
Notes Recorded by Aditi Faustin PA-C on 6/28/2018 at 9:14 AM  Features most consistent with hypertrophic scar with keloidal collagen (enlarged scar). No additional tx needed.    Patient notified of test results and providers message, patient has no further questions.    Meche EDMONDSRN BSN  [facility]  [phone]

## 2018-07-02 ENCOUNTER — ALLIED HEALTH/NURSE VISIT (OUTPATIENT)
Dept: DERMATOLOGY | Facility: CLINIC | Age: 38
End: 2018-07-02
Payer: COMMERCIAL

## 2018-07-02 DIAGNOSIS — Z48.01 ENCOUNTER FOR CHANGE OR REMOVAL OF SURGICAL WOUND DRESSING: Primary | ICD-10-CM

## 2018-07-02 PROCEDURE — 99207 ZZC NO CHARGE NURSE ONLY: CPT

## 2018-07-02 NOTE — NURSING NOTE
Pt returned to clinic for post surgery 1 week follow up bandage change. Pt has no complaints, denies pain. Bandage removed from suprapubic area, area cleansed with normal saline. Site is healing and wound edges approximating well. Reapplied new steri strips and paper tape.    Advised to watch for signs/sx of infection; spreading redness, drainage, odor, fever. Call or report promptly to clinic. Pt given written instructions and informed to rtc as needed. Patient verbalized understanding.     MONTANA Correa-BSN  [facility]  [phone]

## 2018-07-02 NOTE — PATIENT INSTRUCTIONS

## 2018-07-02 NOTE — MR AVS SNAPSHOT
After Visit Summary   7/2/2018    Sea Solis    MRN: [id number]           Patient Information     Date Of Birth          1980        Visit Information        Provider Department      7/2/2018 11:00 AM  DERM NURSE [facility]        Care Instructions    WOUND CARE INSTRUCTIONS  for  ONE WEEK AFTER SURGERY          1) Leave flat bandage on your skin for one week after today s bandage change.  2) In one week when you remove the bandage, you may resume your regular skin care routine, including washing with mild soap and water, applying moisturizer, make-up and sunscreen.    3) If there are any open or bleeding areas at the incision/graft site you should begin to cover the area with a bandage daily as follows:    1) Clean and dry the area with plain tap water using a Q-tip or sterile gauze pad.  2) Apply Polysporin or Bacitracin ointment to the open area.  3) Cover the wound with a band-aid or a sterile non-stick gauze pad and micropore paper tape.         SIGNS OF INFECTION  - If you notice any of these signs of infection, call your doctor right away: expanding redness around the wound.  - Yellow or greenish-colored pus or cloudy wound drainage.    - Red streaking spreading from the wound.  - Increased swelling, tenderness, or pain around the wound.   - Fever.    Please remember that yellow and clear drainage from a wound can be normal and related to normal wound healing.  Isolated drainage from a wound without a combination of the above features does not indicate infection.       *Once the bandages are removed, the scar will be red and firm (especially in the lip/chin area). This is normal and will fade in time. It might take 6-12 months for this to happen.     *Massaging the area will help the scar soften and fade quicker. Begin to massage the area one month after the bandages have been removed. To massage apply pressure directly and firmly over the scar with the  fingertips and move in a circular motion. Massage the area for a few minutes several times a day. Continue to massage the site for several months.    *Approximately 6-8 weeks after surgery it is not uncommon to see the formation of  tender pimple-like  bump along the scar. This is normal. As the scar continues to mature and the stitches underneath the skin begin to dissolve, this might occur. Do not pick or squeeze, this will resolve on it s own. Should one break open producing a small amount of drainage, apply Polysporin or Bacitracin ointment a few times a day until the wound is completely healed.    *Numbness in the surgical area is expected. It might take 12-18 months for the feeling to return to normal. During this time sensations of itchiness, tingling and occasional sharp pains might be noted. These feelings are normal and will subside once the nerves have completely healed.         IN CASE OF EMERGENCY: Dr Gaona [phone]     If you were seen in [place] call: [phone]    If you were seen in [place] call: [phone]            Follow-ups after your visit        Your next 10 appointments already scheduled     Jul 02, 2018 11:00 AM CDT   Nurse Only with OX DERM NURSE   [facility] ([facility]    [address] [place] [id number] [phone]              Who to contact     If you have questions or need follow up information about today's clinic visit or your schedule please contact [facility] directly at [phone].  Normal or non-critical lab and imaging results will be communicated to you by MyChart, letter or phone within 4 business days after the clinic has received the results. If you do not hear from us within 7 days, please contact the clinic through MyChart or phone. If you have a critical or abnormal lab result, we will notify you by phone as soon as possible.  Submit refill requests through  Bobby or call your pharmacy and they will forward the refill request to us. Please allow 3 business days for your refill to be completed.          Additional Information About Your Visit        Care EveryWhere ID     This is your Care EveryWhere ID. This could be used by other organizations to access your [facility] medical records  [id number]         Blood Pressure from Last 3 Encounters:   06/25/18 112/67   05/16/18 118/67   04/11/18 110/72    Weight from Last 3 Encounters:   04/11/18 70.3 kg (155 lb)   03/19/18 71.6 kg (157 lb 12.8 oz)   04/03/17 69.4 kg (153 lb)              Today, you had the following     No orders found for display       Primary Care Provider Office Phone # Fax #    ADELA Mendez [phone] [address] [facility] [address]        Equal Access to Services     [facility]: Hadii aad ku hadasho Soomaali, waaxda luqadaha, qaybta kaalmada adeegyada, waxay katiain hayaan lebron rowe . So [facility] [phone].    ATENCIÓN: Si habla español, tiene a mathias disposición servicios gratuitos de asistencia lingüística. Charity al [phone].    We comply with applicable federal civil rights laws and [place] laws. We do not discriminate on the basis of race, color, national origin, age, disability, sex, sexual orientation, or gender identity.            Thank you!     Thank you for choosing [facility]  for your care. Our goal is always to provide you with excellent care. Hearing back from our patients is one way we can continue to improve our services. Please take a few minutes to complete the written survey that you may receive in the mail after your visit with us. Thank you!             Your Updated Medication List - Protect others around you: Learn how to safely use, store and throw away your medicines at www.disposemymeds.org.          This list is accurate as of 7/2/18 10:30 AM.  Always use your most recent med list.                    Brand Name Dispense Instructions for use Diagnosis    ibuprofen 800 MG tablet    ADVIL/MOTRIN    60 tablet    Take 1 tablet (800 mg) by mouth every 8 hours as needed for moderate pain    Pain of right middle finger       sildenafil 50 MG tablet    VIAGRA    6 tablet    Take 1 tablet (50 mg) by mouth daily as needed 30 min to 4 hrs before sex. Do not use with nitroglycerin, terazosin or doxazosin.    Erectile dysfunction, unspecified erectile dysfunction type

## 2018-07-13 ENCOUNTER — TRANSFERRED RECORDS (OUTPATIENT)
Dept: HEALTH INFORMATION MANAGEMENT | Facility: CLINIC | Age: 38
End: 2018-07-13

## 2018-10-01 ENCOUNTER — OFFICE VISIT (OUTPATIENT)
Dept: DERMATOLOGY | Facility: CLINIC | Age: 38
End: 2018-10-01
Payer: COMMERCIAL

## 2018-10-01 VITALS — DIASTOLIC BLOOD PRESSURE: 68 MMHG | OXYGEN SATURATION: 99 % | HEART RATE: 69 BPM | SYSTOLIC BLOOD PRESSURE: 109 MMHG

## 2018-10-01 DIAGNOSIS — L91.0 SCARRING, HYPERTROPHIC: Primary | ICD-10-CM

## 2018-10-01 PROCEDURE — 11900 INJECT SKIN LESIONS </W 7: CPT | Performed by: PHYSICIAN ASSISTANT

## 2018-10-01 NOTE — LETTER
10/1/2018         RE: Sea Solis  [address]  [place] [address]        Dear Colleague,    Thank you for referring your patient, Sea Solis, to the [facility]. Please see a copy of my visit note below.    HPI:  Sea Solis is a 38 year old male patient here today for kenalog injection into hypertrophic scarring of chest .  Patient states this has been present for awhile.  Patient reports the following symptoms: pain .  Patient reports the following previous treatments: Kenalog 10 with great improvement . Snake venom/skin?? With good improvement. Patient reports the following modifying factors: none.  Associated symptoms: none.  Patient has no other skin complaints today.  Remainder of the HPI, Meds, PMH, Allergies, FH, and SH was reviewed in chart.    Pertinent Hx:   Cyst keloids/hypertrophic scarring   Past Medical History:   Diagnosis Date     Hyperlipidemia 3/21/2018       History reviewed. No pertinent surgical history.     History reviewed. No pertinent family history.    Social History     Social History     Marital status:      Spouse name: N/A     Number of children: N/A     Years of education: N/A     Occupational History     Not on file.     Social History Main Topics     Smoking status: Former Smoker     Smokeless tobacco: Never Used     Alcohol use 0.0 oz/week     0 Standard drinks or equivalent per week      Comment: 2-3x week     Drug use: Yes     Special: Marijuana      Comment: Occasional     Sexual activity: Yes     Partners: Female     Other Topics Concern     Not on file     Social History Narrative       Outpatient Encounter Prescriptions as of 10/1/2018   Medication Sig Dispense Refill     ibuprofen (ADVIL/MOTRIN) 800 MG tablet Take 1 tablet (800 mg) by mouth every 8 hours as needed for moderate pain 60 tablet 1     sildenafil (VIAGRA) 50 MG tablet Take 1 tablet (50 mg) by mouth daily as needed 30 min to 4 hrs before  sex. Do not use with nitroglycerin, terazosin or doxazosin. 6 tablet 1     No facility-administered encounter medications on file as of 10/1/2018.        Review Of Systems:  Skin: As above  Eyes: negative  Ears/Nose/Throat: negative  Respiratory: No shortness of breath, dyspnea on exertion, cough, or hemoptysis  Cardiovascular: negative  Gastrointestinal: negative  Genitourinary: negative  Musculoskeletal: negative  Neurologic: negative  Psychiatric: negative  Hematologic/Lymphatic/Immunologic: negative  Endocrine: negative      Objective:     /68  Pulse 69  SpO2 99%  Eyes: Conjunctivae/lids: Normal   ENT: Lips:  Normal  MSK: Normal  Cardiovascular: Peripheral edema none  Pulm: Breathing Normal  Neuro/Psych: Orientation: Normal; Mood/Affect: Normal, NAD, WDWN  Pt accompanied by: self  Following areas examined: face, neck, chest, abdomen  Villafana skin type:v   Findings:  1) 6 firm wd smooth papules on chest and supra pubic region  Assessment and Plan:  1) hypertrophic scarring  Injected 0.4 cc of Kenalog 6 lesions on chest and supra pubic.. Disc permanent AE such as hypopigmentation and atrophy. May need additional treatment. May not be effective.   Lot #:[id number]  Exp: April 2020      Follow up in 4 weeks for injections      Again, thank you for allowing me to participate in the care of your patient.        Sincerely,        Aditi Faustin PA-C

## 2018-10-01 NOTE — PATIENT INSTRUCTIONS
Proper skin care from [facility]:    -Eliminate harsh soaps as they strip the natural oils from the skin, often resulting in dry itchy skin ( i.e. Dial, Zest, Shanna Spring)  -Use mild soaps such as Cetaphil or Dove Sensitive Skin in the shower. You do not need to use soap on arms, legs, and trunk every time you shower unless visibly soiled.   -Avoid hot or cold showers.  -After showering, lightly dry off and apply moisturizing within 2-3 minutes. This will help trap moisture in the skin.   -Aggressive use of a moisturizer at least 1-2 times a day to the entire body (including -Vanicream, Cetaphil, Aquaphor or Cerave) and moisturize hands after every washing.  -We recommend using moisturizers that come in a tub that needs to be scooped out, not a pump. This has more of an oil base. It will hold moisture in your skin much better than a water base moisturizer. The above recommended are non-pore clogging.    Today we injected Kenalog. Kenalog is an antiinflammatory medication. We can do injections every four weeks as needed. Atrophy (thinning of the skin) and pigment changes can occur.       Follow up in 1 month as needed.

## 2018-10-01 NOTE — PROGRESS NOTES
HPI:  Sea Solis is a 38 year old male patient here today for kenalog injection into hypertrophic scarring of chest .  Patient states this has been present for awhile.  Patient reports the following symptoms: pain .  Patient reports the following previous treatments: Kenalog 10 with great improvement . Snake venom/skin?? With good improvement. Patient reports the following modifying factors: none.  Associated symptoms: none.  Patient has no other skin complaints today.  Remainder of the HPI, Meds, PMH, Allergies, FH, and SH was reviewed in chart.    Pertinent Hx:   Cyst keloids/hypertrophic scarring   Past Medical History:   Diagnosis Date     Hyperlipidemia 3/21/2018       History reviewed. No pertinent surgical history.     History reviewed. No pertinent family history.    Social History     Social History     Marital status:      Spouse name: N/A     Number of children: N/A     Years of education: N/A     Occupational History     Not on file.     Social History Main Topics     Smoking status: Former Smoker     Smokeless tobacco: Never Used     Alcohol use 0.0 oz/week     0 Standard drinks or equivalent per week      Comment: 2-3x week     Drug use: Yes     Special: Marijuana      Comment: Occasional     Sexual activity: Yes     Partners: Female     Other Topics Concern     Not on file     Social History Narrative       Outpatient Encounter Prescriptions as of 10/1/2018   Medication Sig Dispense Refill     ibuprofen (ADVIL/MOTRIN) 800 MG tablet Take 1 tablet (800 mg) by mouth every 8 hours as needed for moderate pain 60 tablet 1     sildenafil (VIAGRA) 50 MG tablet Take 1 tablet (50 mg) by mouth daily as needed 30 min to 4 hrs before sex. Do not use with nitroglycerin, terazosin or doxazosin. 6 tablet 1     No facility-administered encounter medications on file as of 10/1/2018.        Review Of Systems:  Skin: As above  Eyes: negative  Ears/Nose/Throat: negative  Respiratory: No shortness of breath,  dyspnea on exertion, cough, or hemoptysis  Cardiovascular: negative  Gastrointestinal: negative  Genitourinary: negative  Musculoskeletal: negative  Neurologic: negative  Psychiatric: negative  Hematologic/Lymphatic/Immunologic: negative  Endocrine: negative      Objective:     /68  Pulse 69  SpO2 99%  Eyes: Conjunctivae/lids: Normal   ENT: Lips:  Normal  MSK: Normal  Cardiovascular: Peripheral edema none  Pulm: Breathing Normal  Neuro/Psych: Orientation: Normal; Mood/Affect: Normal, NAD, WDWN  Pt accompanied by: self  Following areas examined: face, neck, chest, abdomen  Villafana skin type:v   Findings:  1) 6 firm wd smooth papules on chest and supra pubic region  Assessment and Plan:  1) hypertrophic scarring  IL TMC: PGACAC discussed.  Risks including but not limited to injection site reaction, bruising, no resolution.  All questions answered and entertained to patient s satisfaction.  Informed consent obtained.  IL TAC in concentration of 10mg/ml was injected ID to 6 lesion.  Total injected was  0.4 ml.  Patient tolerated without complications and given wound care instructions, including not to move product around.  Return in 4 weeks for follow-up and possible additional IL TAC. May need additional treatment. May not be effective.    Lot #:[id number]  Exp: April 2020      Follow up in 4 weeks for injections

## 2018-10-01 NOTE — MR AVS SNAPSHOT
After Visit Summary   10/1/2018    Sea Solis    MRN: [id number]           Patient Information     Date Of Birth          1980        Visit Information        Provider Department      10/1/2018 11:40 AM Aditi Faustin PA-C [facility]        Care Instructions    Proper skin care from [facility]:    -Eliminate harsh soaps as they strip the natural oils from the skin, often resulting in dry itchy skin ( i.e. Dial, Zest, Shanna Spring)  -Use mild soaps such as Cetaphil or Dove Sensitive Skin in the shower. You do not need to use soap on arms, legs, and trunk every time you shower unless visibly soiled.   -Avoid hot or cold showers.  -After showering, lightly dry off and apply moisturizing within 2-3 minutes. This will help trap moisture in the skin.   -Aggressive use of a moisturizer at least 1-2 times a day to the entire body (including -Vanicream, Cetaphil, Aquaphor or Cerave) and moisturize hands after every washing.  -We recommend using moisturizers that come in a tub that needs to be scooped out, not a pump. This has more of an oil base. It will hold moisture in your skin much better than a water base moisturizer. The above recommended are non-pore clogging.    Today we injected Kenalog. Kenalog is an antiinflammatory medication. We can do injections every four weeks as needed. Atrophy (thinning of the skin) and pigment changes can occur.       Follow up in 1 month as needed.             Follow-ups after your visit        Who to contact     If you have questions or need follow up information about today's clinic visit or your schedule please contact [facility] directly at [phone].  Normal or non-critical lab and imaging results will be communicated to you by MyChart, letter or phone within 4 business days after the clinic has received the results. If you do not hear from us within 7 days, please contact the clinic  through Evalve[facility] or phone. If you have a critical or abnormal lab result, we will notify you by phone as soon as possible.  Submit refill requests through Evalvehart or call your pharmacy and they will forward the refill request to us. Please allow 3 business days for your refill to be completed.          Additional Information About Your Visit        Care EveryWhere ID     This is your Care EveryWhere ID. This could be used by other organizations to access your [facility] medical records  [id number]        Your Vitals Were     Pulse Pulse Oximetry                69 99%           Blood Pressure from Last 3 Encounters:   10/01/18 109/68   06/25/18 112/67   05/16/18 118/67    Weight from Last 3 Encounters:   04/11/18 70.3 kg (155 lb)   03/19/18 71.6 kg (157 lb 12.8 oz)   04/03/17 69.4 kg (153 lb)              Today, you had the following     No orders found for display       Primary Care Provider Office Phone # Fax #    Mikelkatarina ADELA Young [phone] [address] [facility] [address]        Equal Access to Services     [facility]: Hadii aad ku hadasho Soomaali, waaxda luqadaha, qaybta kaalmada adeegyarob, martha rowe . So [facility] [place] [facility] [phone].    ATENCIÓN: Si habla español, tiene a mathias disposición servicios gratuitos de asistencia lingüística. [facility] [phone].    We comply with applicable federal civil rights laws and [place] laws. We do not discriminate on the basis of race, color, national origin, age, disability, sex, sexual orientation, or gender identity.            Thank you!     Thank you for choosing [facility]  for your care. Our goal is always to provide you with excellent care. Hearing back from our patients is one way we can continue to improve our services. Please take a few minutes to complete the written survey that you may receive in the mail after your visit with us. Thank you!             Your Updated  Medication List - Protect others around you: Learn how to safely use, store and throw away your medicines at www.disposemymeds.org.          This list is accurate as of 10/1/18 12:05 PM.  Always use your most recent med list.                   Brand Name Dispense Instructions for use Diagnosis    ibuprofen 800 MG tablet    ADVIL/MOTRIN    60 tablet    Take 1 tablet (800 mg) by mouth every 8 hours as needed for moderate pain    Pain of right middle finger       sildenafil 50 MG tablet    VIAGRA    6 tablet    Take 1 tablet (50 mg) by mouth daily as needed 30 min to 4 hrs before sex. Do not use with nitroglycerin, terazosin or doxazosin.    Erectile dysfunction, unspecified erectile dysfunction type

## 2019-01-21 ENCOUNTER — OFFICE VISIT (OUTPATIENT)
Dept: DERMATOLOGY | Facility: CLINIC | Age: 39
End: 2019-01-21

## 2019-01-21 VITALS — HEART RATE: 77 BPM | OXYGEN SATURATION: 100 % | SYSTOLIC BLOOD PRESSURE: 118 MMHG | DIASTOLIC BLOOD PRESSURE: 57 MMHG

## 2019-01-21 DIAGNOSIS — L91.0 KELOID: Primary | ICD-10-CM

## 2019-01-21 PROCEDURE — 11901 INJECT SKIN LESIONS >7: CPT | Performed by: PHYSICIAN ASSISTANT

## 2019-01-21 NOTE — LETTER
1/21/2019         RE: Sea Solis  [address]  [place] [address]        Dear Colleague,    Thank you for referring your patient, Sea Solis, to the [facility]. Please see a copy of my visit note below.    HPI:  Sea Solis is a 38 year old male patient here today for kenalog injection into keloid scarring of chest .  Patient states this has been present for awhile.  Patient reports the following symptoms: pain .  Patient reports the following previous treatments: Kenalog 10 with great improvement . Patient reports the following modifying factors: none.  Associated symptoms: none.  Patient has no other skin complaints today.  Remainder of the HPI, Meds, PMH, Allergies, FH, and SH was reviewed in chart.    Pertinent Hx:   keloids  Past Medical History:   Diagnosis Date     Hyperlipidemia 3/21/2018       History reviewed. No pertinent surgical history.     History reviewed. No pertinent family history.    Social History     Socioeconomic History     Marital status:      Spouse name: Not on file     Number of children: Not on file     Years of education: Not on file     Highest education level: Not on file   Social Needs     Financial resource strain: Not on file     Food insecurity - worry: Not on file     Food insecurity - inability: Not on file     Transportation needs - medical: Not on file     Transportation needs - non-medical: Not on file   Occupational History     Not on file   Tobacco Use     Smoking status: Former Smoker     Smokeless tobacco: Never Used   Substance and Sexual Activity     Alcohol use: Yes     Alcohol/week: 0.0 oz     Comment: 2-3x week     Drug use: Yes     Types: Marijuana     Comment: Occasional     Sexual activity: Yes     Partners: Female   Other Topics Concern     Not on file   Social History Narrative     Not on file       Outpatient Encounter Medications as of 1/21/2019   Medication Sig Dispense Refill      ibuprofen (ADVIL/MOTRIN) 800 MG tablet Take 1 tablet (800 mg) by mouth every 8 hours as needed for moderate pain (Patient not taking: Reported on 1/21/2019) 60 tablet 1     sildenafil (VIAGRA) 50 MG tablet Take 1 tablet (50 mg) by mouth daily as needed 30 min to 4 hrs before sex. Do not use with nitroglycerin, terazosin or doxazosin. (Patient not taking: Reported on 1/21/2019) 6 tablet 1     No facility-administered encounter medications on file as of 1/21/2019.        Review Of Systems:  Skin: As above  Eyes: negative  Ears/Nose/Throat: negative  Respiratory: No shortness of breath, dyspnea on exertion, cough, or hemoptysis  Cardiovascular: negative  Gastrointestinal: negative  Genitourinary: negative  Musculoskeletal: negative  Neurologic: negative  Psychiatric: negative  Hematologic/Lymphatic/Immunologic: negative  Endocrine: negative      Objective:     /57   Pulse 77   SpO2 100%   Eyes: Conjunctivae/lids: Normal   ENT: Lips:  Normal  MSK: Normal  Cardiovascular: Peripheral edema none  Pulm: Breathing Normal  Neuro/Psych: Orientation: Normal; Mood/Affect: Normal, NAD, WDWN  Pt accompanied by: self  Following areas examined: face, neck, chest, abdomen  Villafana skin type:v   Findings:  1) 8 firm wd smooth papules on mid chest and left chest  Assessment and Plan:  1) keloids x 8  IL TMC: PGACAC discussed.  Risks including but not limited to injection site reaction, bruising, no resolution.  All questions answered and entertained to patient s satisfaction.  Informed consent obtained.  IL TAC in concentration of 40mg/ml was injected ID to 6 lesion.  Total injected was  0.5 ml.  Patient tolerated without complications and given wound care instructions, including not to move product around.  Return in 4 weeks for follow-up and possible additional IL TAC. May need additional treatment. May not be effective.    Lot #:[id number]  Exp: April 2020      Follow up in 4 weeks for injections      Again, thank you for  allowing me to participate in the care of your patient.        Sincerely,        Aditi Faustin PA-C

## 2019-01-21 NOTE — PATIENT INSTRUCTIONS
Today we injected Kenalog. Kenalog is an antiinflammatory medication. We can do injections every four weeks as needed. Atrophy (thinning of the skin) and pigment changes can occur.    Proper skin care from [facility]:    -Eliminate harsh soaps as they strip the natural oils from the skin, often resulting in dry itchy skin ( i.e. Dial, Zest, [place])  -Use mild soaps such as Cetaphil or Dove Sensitive Skin in the shower. You do not need to use soap on arms, legs, and trunk every time you shower unless visibly soiled.   -Avoid hot or cold showers.  -After showering, lightly dry off and apply moisturizing within 2-3 minutes. This will help trap moisture in the skin.   -Aggressive use of a moisturizer at least 1-2 times a day to the entire body (including -Vanicream, Cetaphil, Aquaphor or Cerave) and moisturize hands after every washing.  -We recommend using moisturizers that come in a tub that needs to be scooped out, not a pump. This has more of an oil base. It will hold moisture in your skin much better than a water base moisturizer. The above recommended are non-pore clogging.

## 2019-01-21 NOTE — PROGRESS NOTES
HPI:  Sea Solis is a 38 year old male patient here today for kenalog injection into keloid scarring of chest .  Patient states this has been present for awhile.  Patient reports the following symptoms: pain .  Patient reports the following previous treatments: Kenalog 10 with great improvement . Patient reports the following modifying factors: none.  Associated symptoms: none.  Patient has no other skin complaints today.  Remainder of the HPI, Meds, PMH, Allergies, FH, and SH was reviewed in chart.    Pertinent Hx:   keloids  Past Medical History:   Diagnosis Date     Hyperlipidemia 3/21/2018       History reviewed. No pertinent surgical history.     History reviewed. No pertinent family history.    Social History     Socioeconomic History     Marital status:      Spouse name: Not on file     Number of children: Not on file     Years of education: Not on file     Highest education level: Not on file   Social Needs     Financial resource strain: Not on file     Food insecurity - worry: Not on file     Food insecurity - inability: Not on file     Transportation needs - medical: Not on file     Transportation needs - non-medical: Not on file   Occupational History     Not on file   Tobacco Use     Smoking status: Former Smoker     Smokeless tobacco: Never Used   Substance and Sexual Activity     Alcohol use: Yes     Alcohol/week: 0.0 oz     Comment: 2-3x week     Drug use: Yes     Types: Marijuana     Comment: Occasional     Sexual activity: Yes     Partners: Female   Other Topics Concern     Not on file   Social History Narrative     Not on file       Outpatient Encounter Medications as of 1/21/2019   Medication Sig Dispense Refill     ibuprofen (ADVIL/MOTRIN) 800 MG tablet Take 1 tablet (800 mg) by mouth every 8 hours as needed for moderate pain (Patient not taking: Reported on 1/21/2019) 60 tablet 1     sildenafil (VIAGRA) 50 MG tablet Take 1 tablet (50 mg) by mouth daily as needed 30 min to 4 hrs  before sex. Do not use with nitroglycerin, terazosin or doxazosin. (Patient not taking: Reported on 1/21/2019) 6 tablet 1     No facility-administered encounter medications on file as of 1/21/2019.        Review Of Systems:  Skin: As above  Eyes: negative  Ears/Nose/Throat: negative  Respiratory: No shortness of breath, dyspnea on exertion, cough, or hemoptysis  Cardiovascular: negative  Gastrointestinal: negative  Genitourinary: negative  Musculoskeletal: negative  Neurologic: negative  Psychiatric: negative  Hematologic/Lymphatic/Immunologic: negative  Endocrine: negative      Objective:     /57   Pulse 77   SpO2 100%   Eyes: Conjunctivae/lids: Normal   ENT: Lips:  Normal  MSK: Normal  Cardiovascular: Peripheral edema none  Pulm: Breathing Normal  Neuro/Psych: Orientation: Normal; Mood/Affect: Normal, NAD, WDWN  Pt accompanied by: self  Following areas examined: face, neck, chest, abdomen  Villafana skin type:v   Findings:  1) 8 firm wd smooth papules on mid chest and left chest  Assessment and Plan:  1) keloids x 8  IL TMC: PGACAC discussed.  Risks including but not limited to injection site reaction, bruising, no resolution.  All questions answered and entertained to patient s satisfaction.  Informed consent obtained.  IL TAC in concentration of 40mg/ml was injected ID to 6 lesion.  Total injected was  0.5 ml.  Patient tolerated without complications and given wound care instructions, including not to move product around.  Return in 4 weeks for follow-up and possible additional IL TAC. May need additional treatment. May not be effective.    Lot #:[id number]  Exp: April 2020      Follow up in 4 weeks for injections

## 2019-03-18 ENCOUNTER — OFFICE VISIT (OUTPATIENT)
Dept: DERMATOLOGY | Facility: CLINIC | Age: 39
End: 2019-03-18
Payer: COMMERCIAL

## 2019-03-18 VITALS — SYSTOLIC BLOOD PRESSURE: 120 MMHG | DIASTOLIC BLOOD PRESSURE: 64 MMHG | HEART RATE: 71 BPM | OXYGEN SATURATION: 99 %

## 2019-03-18 DIAGNOSIS — L91.0 KELOID: Primary | ICD-10-CM

## 2019-03-18 PROCEDURE — 11900 INJECT SKIN LESIONS </W 7: CPT | Performed by: PHYSICIAN ASSISTANT

## 2019-03-18 NOTE — PROGRESS NOTES
HPI:  Sea Solis is a 38 year old male patient here today for kenalog injection into keloid scarring of chest .  Patient states this has been present for awhile.  Patient reports the following symptoms: pain .  Patient reports the following previous treatments: Kenalog 40 with great improvement. Six injected at LOV. Two have improved.  Patient reports the following modifying factors: none.  Associated symptoms: none.  Patient has no other skin complaints today.  Remainder of the HPI, Meds, PMH, Allergies, FH, and SH was reviewed in chart.    Pertinent Hx:   keloids  Past Medical History:   Diagnosis Date     Hyperlipidemia 3/21/2018       No past surgical history on file.     No family history on file.    Social History     Socioeconomic History     Marital status:      Spouse name: Not on file     Number of children: Not on file     Years of education: Not on file     Highest education level: Not on file   Occupational History     Not on file   Social Needs     Financial resource strain: Not on file     Food insecurity:     Worry: Not on file     Inability: Not on file     Transportation needs:     Medical: Not on file     Non-medical: Not on file   Tobacco Use     Smoking status: Former Smoker     Smokeless tobacco: Never Used   Substance and Sexual Activity     Alcohol use: Yes     Alcohol/week: 0.0 oz     Comment: 2-3x week     Drug use: Yes     Types: Marijuana     Comment: Occasional     Sexual activity: Yes     Partners: Female   Lifestyle     Physical activity:     Days per week: Not on file     Minutes per session: Not on file     Stress: Not on file   Relationships     Social connections:     Talks on phone: Not on file     Gets together: Not on file     Attends [employer] service: Not on file     Active member of club or organization: Not on file     Attends meetings of clubs or organizations: Not on file     Relationship status: Not on file     Intimate partner violence:     Fear of current  or ex partner: Not on file     Emotionally abused: Not on file     Physically abused: Not on file     Forced sexual activity: Not on file   Other Topics Concern     Not on file   Social History Narrative     Not on file       Outpatient Encounter Medications as of 3/18/2019   Medication Sig Dispense Refill     ibuprofen (ADVIL/MOTRIN) 800 MG tablet Take 1 tablet (800 mg) by mouth every 8 hours as needed for moderate pain (Patient not taking: Reported on 3/18/2019) 60 tablet 1     sildenafil (VIAGRA) 50 MG tablet Take 1 tablet (50 mg) by mouth daily as needed 30 min to 4 hrs before sex. Do not use with nitroglycerin, terazosin or doxazosin. (Patient not taking: Reported on 3/18/2019) 6 tablet 1     No facility-administered encounter medications on file as of 3/18/2019.        Review Of Systems:  Skin: As above  Eyes: negative  Ears/Nose/Throat: negative  Respiratory: No shortness of breath, dyspnea on exertion, cough, or hemoptysis  Cardiovascular: negative  Gastrointestinal: negative  Genitourinary: negative  Musculoskeletal: negative  Neurologic: negative  Psychiatric: negative  Hematologic/Lymphatic/Immunologic: negative  Endocrine: negative      Objective:     /64   Pulse 71   SpO2 99%   Eyes: Conjunctivae/lids: Normal   ENT: Lips:  Normal  MSK: Normal  Cardiovascular: Peripheral edema none  Pulm: Breathing Normal  Neuro/Psych: Orientation: Normal; Mood/Affect: Normal, NAD, WDWN  Pt accompanied by: self  Following areas examined: face, neck, chest, abdomen  Vill[place]ana skin type:v   Findings:  1) 4 firm wd smooth papules on mid chest and left chest. 4 atropic smooth macule son chest  Assessment and Plan:  1) keloids x 4  IL TMC: PGACAC discussed.  Risks including but not limited to injection site reaction, bruising, no resolution.  All questions answered and entertained to patient s satisfaction.  Informed consent obtained.  IL TAC in concentration of 40mg/ml was injected ID to 4 lesion.  Total injected  was  0.1 ml.  Patient tolerated without complications and given wound care instructions, including not to move product around.  Return in 4 weeks for follow-up and possible additional IL TAC. May need additional treatment. May not be effective.    Lot #:[id number]  Exp: April 2020      Follow up in 4 weeks for injections

## 2019-11-21 ENCOUNTER — TELEPHONE (OUTPATIENT)
Dept: OTHER | Facility: CLINIC | Age: 39
End: 2019-11-21

## 2019-11-26 ENCOUNTER — OFFICE VISIT (OUTPATIENT)
Dept: FAMILY MEDICINE | Facility: CLINIC | Age: 39
End: 2019-11-26
Payer: COMMERCIAL

## 2019-11-26 VITALS
OXYGEN SATURATION: 99 % | HEIGHT: 66 IN | WEIGHT: 166 LBS | TEMPERATURE: 98.4 F | RESPIRATION RATE: 16 BRPM | HEART RATE: 73 BPM | SYSTOLIC BLOOD PRESSURE: 130 MMHG | DIASTOLIC BLOOD PRESSURE: 74 MMHG | BODY MASS INDEX: 26.68 KG/M2

## 2019-11-26 DIAGNOSIS — N39.9 URINARY PROBLEM IN MALE: ICD-10-CM

## 2019-11-26 DIAGNOSIS — Z13.1 SCREENING FOR DIABETES MELLITUS: ICD-10-CM

## 2019-11-26 DIAGNOSIS — E78.5 HYPERLIPIDEMIA, UNSPECIFIED HYPERLIPIDEMIA TYPE: ICD-10-CM

## 2019-11-26 DIAGNOSIS — Z00.00 ROUTINE GENERAL MEDICAL EXAMINATION AT A HEALTH CARE FACILITY: Primary | ICD-10-CM

## 2019-11-26 DIAGNOSIS — G44.209 TENSION HEADACHE: ICD-10-CM

## 2019-11-26 DIAGNOSIS — G47.9 SLEEP DISTURBANCE: ICD-10-CM

## 2019-11-26 DIAGNOSIS — M54.2 CERVICALGIA: ICD-10-CM

## 2019-11-26 LAB
ALBUMIN UR-MCNC: NEGATIVE MG/DL
APPEARANCE UR: CLEAR
BILIRUB UR QL STRIP: NEGATIVE
COLOR UR AUTO: YELLOW
GLUCOSE UR STRIP-MCNC: NEGATIVE MG/DL
HGB UR QL STRIP: NEGATIVE
KETONES UR STRIP-MCNC: NEGATIVE MG/DL
LEUKOCYTE ESTERASE UR QL STRIP: NEGATIVE
NITRATE UR QL: NEGATIVE
PH UR STRIP: 6.5 PH (ref 5–7)
SOURCE: NORMAL
SP GR UR STRIP: 1.02 (ref 1–1.03)
UROBILINOGEN UR STRIP-ACNC: 0.2 EU/DL (ref 0.2–1)

## 2019-11-26 PROCEDURE — 90686 IIV4 VACC NO PRSV 0.5 ML IM: CPT | Performed by: PHYSICIAN ASSISTANT

## 2019-11-26 PROCEDURE — 99213 OFFICE O/P EST LOW 20 MIN: CPT | Mod: 25 | Performed by: PHYSICIAN ASSISTANT

## 2019-11-26 PROCEDURE — 99395 PREV VISIT EST AGE 18-39: CPT | Mod: 25 | Performed by: PHYSICIAN ASSISTANT

## 2019-11-26 PROCEDURE — 81003 URINALYSIS AUTO W/O SCOPE: CPT | Performed by: PHYSICIAN ASSISTANT

## 2019-11-26 PROCEDURE — 87591 N.GONORRHOEAE DNA AMP PROB: CPT | Performed by: PHYSICIAN ASSISTANT

## 2019-11-26 PROCEDURE — 87491 CHLMYD TRACH DNA AMP PROBE: CPT | Performed by: PHYSICIAN ASSISTANT

## 2019-11-26 PROCEDURE — 90471 IMMUNIZATION ADMIN: CPT | Performed by: PHYSICIAN ASSISTANT

## 2019-11-26 ASSESSMENT — MIFFLIN-ST. JEOR: SCORE: 1614.69

## 2019-11-26 ASSESSMENT — PAIN SCALES - GENERAL: PAINLEVEL: NO PAIN (0)

## 2019-11-26 NOTE — PROGRESS NOTES
3  SUBJECTIVE:   CC: Sea Solis is an 39 year old male who presents for preventive health visit.     Healthy Habits:  Do you get at least three servings of calcium containing foods daily (dairy, green leafy vegetables, etc.)? No  Amount of exercise or daily activities, outside of work: 0 day(s) per week  Problems taking medications regularly No  Medication side effects: No  Have you had an eye exam in the past two years? no  Do you see a dentist twice per year? no  Do you have sleep apnea, excessive snoring or daytime drowsiness?no    Weight is up.  Diet and exercise difficult with work.      Has been having posterior neck and headaches, particularly when going from one job to another.  Working 60-70 hours per week.  No paresthesias, cervicalgia    Having hard time waking up, particularly if goes to bed late.  Works 2nd shift.  Gets up around 11am, goes to bed around 4am.  Generally sleeps through the night.      having some drips after urinating, more than expected, no  Dysuria, no fam hx prostate iossues, PSA last year WNL, for a few weeks    Today's PHQ-2 Score:   PHQ-2 ( 1999 Pf[employer]) 11/26/2019 3/19/2018   Q1: Little interest or pleasure in doing things 0 0   Q2: Feeling down, depressed or hopeless 0 0   PHQ-2 Score 0 0       Abuse: Current or Past(Physical, Sexual or Emotional)- No  Do you feel safe in your environment? Yes        Social History     Tobacco Use     Smoking status: Former Smoker     Smokeless tobacco: Never Used   Substance Use Topics     Alcohol use: Yes     Alcohol/week: 0.0 standard drinks     Comment: 2-3x week     If you drink alcohol do you typically have >3 drinks per day or >7 drinks per week? No                      Last PSA:   PSA   Date Value Ref Range Status   03/19/2018 0.44 0 - 4 ug/L Final     Comment:     Assay Method:  Chemiluminescence using Siemens Vista analyzer       Reviewed orders with patient. Reviewed health maintenance and updated orders accordingly -  "Yes      Reviewed and updated as needed this visit by clinical staff  Tobacco  Allergies  Meds  Med Hx  Surg Hx  Fam Hx  Soc Hx        Reviewed and updated as needed this visit by Provider        ROS:  CONSTITUTIONAL: NEGATIVE for fever, chills, change in weight  EYES: NEGATIVE for vision changes or irritation  ENT: NEGATIVE for ear, mouth and throat problems  RESP: NEGATIVE for significant cough or SOB  CV: NEGATIVE for chest pain, palpitations or peripheral edema  GI: NEGATIVE for nausea, abdominal pain, heartburn, or change in bowel habits   male: having some drips after urinating, more than expected, no  dysuria  MUSCULOSKELETAL:neck pan as above  NEURO: headache as Above  PSYCHIATRIC: sleep as above    OBJECTIVE:   /74 (BP Location: Right arm, Patient Position: Sitting, Cuff Size: Adult Large)   Pulse 73   Temp 98.4  F (36.9  C) (Oral)   Resp 16   Ht 1.683 m (5' 6.25\")   Wt 75.3 kg (166 lb)   SpO2 99%   BMI 26.59 kg/m    EXAM:  GENERAL: healthy, alert and no distress  EYES: Eyes grossly normal to inspection, PERRL and conjunctivae and sclerae normal  HENT: ear canals and TM's normal, nose and mouth without ulcers or lesions  NECK: no adenopathy, no asymmetry, masses, or scars, thyroid normal to palpation and KANDI nonttp  RESP: lungs clear to auscultation - no rales, rhonchi or wheezes  CV: regular rate and rhythm, normal S1 S2, no S3 or S4, no murmur, click or rub, no peripheral edema and peripheral pulses strong  ABDOMEN: soft, nontender, no hepatosplenomegaly, no masses and bowel sounds normal  MS: no gross musculoskeletal defects noted, no edema  SKIN: no suspicious lesions or rashes  NEURO: Normal strength and tone, mentation intact and speech normal  PSYCH: mentation appears normal, affect normal/bright    Diagnostic Test Results:  No results found for this or any previous visit (from the past 24 hour(s)).    ASSESSMENT/PLAN:       ICD-10-CM    1. Routine general medical examination at " "a health care facility Z00.00    2. Hyperlipidemia, unspecified hyperlipidemia type E78.5 Lipid panel reflex to direct LDL Fasting   3. Sleep disturbance G47.9 Vitamin D Deficiency   4. Cervicalgia M54.2    5. Urinary problem in male N39.9 UA reflex to Microscopic and Culture     Chlamydia trachomatis PCR     Neisseria gonorrhoeae PCR   6. Tension headache G44.209    7. Screening for diabetes mellitus Z13.1 Glucose whole blood     JUST IN CASE       COUNSELING:  Reviewed preventive health counseling, as reflected in patient instructions       Regular exercise       Healthy diet/nutrition  Sleep hygeine       Immunizations  Vaccinated for: Influenza             HIV screeninx in teen years, 1x in adult years, and at intervals if high risk    Estimated body mass index is 26.59 kg/m  as calculated from the following:    Height as of this encounter: 1.683 m (5' 6.25\").    Weight as of this encounter: 75.3 kg (166 lb).    Weight management plan: Discussed healthy diet and exercise guidelines     reports that he has quit smoking. He has never used smokeless tobacco.      Counseling Resources:  ATP IV Guidelines  Pooled Cohorts Equation Calculator  FRAX Risk Assessment  ICSI Preventive Guidelines  Dietary Guidelines for Americans, 2010  USDA's MyPlate  ASA Prophylaxis  Lung CA Screening    ADELA Mendez  [facility]  "

## 2019-11-26 NOTE — PATIENT INSTRUCTIONS
At [facility], we strive to deliver an exceptional experience to you, every time we see you.  If you receive a survey in the mail, please send us back your thoughts. We really do value your feedback.    Based on your medical history, these are the current health maintenance/preventive care services that you are due for (some may have been done at this visit.)  Health Maintenance Due   Topic Date Due     PREVENTIVE CARE VISIT  1980     HIV SCREENING  08/02/1995     PHQ-2  01/01/2019     LIPID  03/19/2019     INFLUENZA VACCINE (1) 09/01/2019         Suggested websites for health information:  Www.[facility].org : Up to date and easily searchable information on multiple topics.  Www.medlineplus.gov : medication info, interactive tutorials, watch real surgeries online  Www.familydoctor.org : good info from the Academy of Family Physicians  Www.cdc.gov : public health info, travel advisories, epidemics (H1N1)  Www.aap.org : children's health info, normal development, vaccinations  Www.health.[facility].[place].us : [place] dept of health, public health issues in [place], N1N1    Your care team:                            Family Medicine Internal Medicine   MD Eric Best MD Shantel Branch-Fleming, MD Katya Georgiev PA-C Nam Ho, MD Pediatrics   GALILEO Rodriguez, NAKUL Goode APRN CNP   MD Shruti Trotter MD Deborah Mielke, MD Kim Thein, APRN [facility]      Clinic hours: Monday - Thursday 7 am-7 pm; Fridays 7 am-5 pm.   Urgent care: Monday - Friday 11 am-9 pm; Saturday and Sunday 9 am-5 pm.  Pharmacy : Monday -Thursday 8 am-8 pm; Friday 8 am-6 pm; Saturday and Sunday 9 am-5 pm.     Clinic: [phone]   Pharmacy: [phone]    Preventive Health Recommendations  Male Ages 26 - 39    Yearly exam:             See your health care provider every year in order to  o   Review health changes.   o   Discuss preventive care.    o   Review your medicines  if your doctor has prescribed any.    You should be tested each year for STDs (sexually transmitted diseases), if you re at risk.     After age 35, talk to your provider about cholesterol testing. If you are at risk for heart disease, have your cholesterol tested at least every 5 years.     If you are at risk for diabetes, you should have a diabetes test (fasting glucose).  Shots: Get a flu shot each year. Get a tetanus shot every 10 years.     Nutrition:    Eat at least 5 servings of fruits and vegetables daily.     Eat whole-grain bread, whole-wheat pasta and brown rice instead of white grains and rice.     Get adequate Calcium and Vitamin D.     Lifestyle    Exercise for at least 150 minutes a week (30 minutes a day, 5 days a week). This will help you control your weight and prevent disease.     Limit alcohol to one drink per day.     No smoking.     Wear sunscreen to prevent skin cancer.     See your dentist every six months for an exam and cleaning.     Patient Education     Tension Headache    A muscle tension headache is a very common cause of head pain. It s also called a stress headache. When some people are under stress, they tense the muscles of their shoulder, neck, and scalp without knowing it. If this tension lasts long enough, a headache can occur. A tension headache can be quite painful. It can last for hours or even days.  Home care  Follow these tips when caring for yourself at home:    Don t drive yourself home if you were given pain medicine for your headache. Instead, have someone else drive you home. Try to sleep when you get home. You should feel much better when you wake up.    Put heat on the back of your neck to help ease neck spasm.    Drink only clear liquids or eat a light diet until your symptoms get better. This will help you avoid nausea or vomiting.  How to prevent headaches    Figure out what is causing stress in your life. Learn new ways to handle your stress. Ideas include regular  "exercise, biofeedback, self-hypnosis, yoga, and meditation. Talk with your healthcare provider to find out more information about managing stress. Many books and digital media are also available on this subject.    Take time out at the first sign of a tension headache, if possible. Take yourself out of the stressful situation. Find a quiet, comfortable place to sit or lie down and let yourself relax. Heat and deep massage of the tight areas in the neck and shoulders may help ease muscle spasm. You may also get relief from a medicine like ibuprofen or a prescribed muscle relaxant.  Follow-up care  Follow up with your healthcare provider, or as advised. Talk with your provider if you have frequent headaches. He or she can figure out a treatment plan. Ask if you can have medicine to take at home the next time you get a bad headache. This may keep you from having to visit the emergency department in the future. You may need to see a headache specialist (neurologist) if you continue to have headaches.  When to seek medical advice  Call your healthcare provider right away if any of these occur:    Your head pain gets worse during sexual intercourse or strenuous activity    Your head pain doesn t get better within 24 hours    You aren t able to keep liquids down (repeated vomiting)    Fever of 100.4 F (38 C) or higher, or as directed by your healthcare provider    Stiff neck    Extreme drowsiness, confusion, or fainting    Dizziness or dizziness with spinning sensation (vertigo)    Weakness in an arm or leg or one side of your face    You have difficulty speaking    Your vision changes  Date Last Reviewed: 8/1/2016 2000-2018 [employer]. [address] [place] [address], [place] [address]. All rights reserved. This information is not intended as a substitute for professional medical care. Always follow your healthcare professional's instructions.           Patient Education     Tips for Sleep Hygiene  \"Sleep " "hygiene\" means having good sleep habits.Follow these tips to sleep better at night:     Get on a schedule. Go to bed and get up at about the same time every day.    Listen to your body. Only try to sleep when you actually feel tired or sleepy.    Be patient. If you haven't been able to get to sleep after about 30 minutes or more, get up and do something calming or boring until you feel sleepy. Then return to bed and try again.    Don't have caffeine (coffee, tea, cola drinks, chocolate and some medicines), alcohol or nicotine (cigarettes). These can make it harder for you to fall asleep and stay asleep.    Use your bed for sleeping only. That means no TV, computer or homework in bed, especially during the evening and before bedtime.    Don't nap during the day. If you must nap, make sure it is for less than 20 minutes.    Create sleep rituals that remind your body it is time to sleep. Examples include breathing exercises, stretching or reading a book.    Avoid all electronic media (smart phone, computer, tablet) within 2 hours of bed time. The \"blue light\" in these devices activates the part of the brain that keeps you awake.    Dim the lights at night.    Get early morning sources of light (walk in the sunshine) to help set sleep patterns at night.    Try a bath or shower before bed. Having a warm bath 1 to 2 hours before bedtime can help you feel sleepy. Hot baths can make you alert, so be mindful of the temperature.    Don't watch the clock. Checking the clock during the night can wake you up. It can also lead to negative thoughts such as, \"I will never fall asleep,\" which can increase anxiety and sleeplessness.    Use a sleep diary. Track your sleep schedule to know your sleep patterns and to see where you can improve.    Get regular exercise every day. Try not to do heavy exercise in the 4 hours before bedtime.    Eat a healthy, balanced diet.    Try eating a light, healthy snack before bed, but avoid eating a " "heavy meal.    Create the right sleeping area. A cool, dark, quiet room is best. If needed, try earplugs, fans and blackout curtains.    Keep your daytime routine the same even if you have a bad night sleep. Avoiding activities the next day can make it harder to sleep.  For informational purposes only. Not to replace the advice of your health care provider.   Copyright   2013 [employer]. All rights reserved. [employer] 854166 - 01/16.  For informational purposes only. Not to replace the advice of your health care provider.  Copyright   2018 [employer]. All rights reserved.           Patient Education     Controlling Your Cholesterol  Cholesterol is a waxy substance. It travels in your blood through the blood vessels. When you have high cholesterol, it can build up along the walls of the blood vessels. This makes the vessels narrower and decreases blood flow. You are then at greater risk of having a heart attack or a stroke.  Good and bad cholesterol  Lipids are fats, and blood is mostly water. Fat and water don't mix. So our bodies need lipoproteins (lipids inside a protein shell) to carry the lipids. The protein shell carries its lipids through the bloodstream. There are two main kinds of lipoproteins:    LDL (low-density lipoprotein) is known as \"bad cholesterol.\" It mainly carries cholesterol. It delivers this cholesterol to body cells. Excess LDL cholesterol will build up in artery walls. This increases your risk for heart disease and stroke.    HDL (high-density lipoprotein) is known as \"good cholesterol.\" This protein shell collects excess cholesterol that LDLs have left behind on blood vessel walls. That's why high levels of HDL cholesterol can decrease your risk of heart disease and stroke.  Controlling cholesterol levels  Total cholesterol includes LDL and HDL cholesterol, as well as other fats in the bloodstream. If your total cholesterol is high, follow the steps below to help " lower your total cholesterol level:  Eat less unhealthy fat    Cut back on saturated fats and trans fats (also called hydrogenated) by selecting lean cuts of meat, low-fat dairy, and using oils instead of solid fats. Limit baked goods, processed meats, and fried foods. A diet that s high in these fats increases your bad cholesterol. It's not enough to just cut back on foods containing cholesterol.    Eat about 2 servings of fish per week. Most fish contain omega-3 fatty acids. These help lower blood cholesterol.    Eat more whole grains and soluble fiber (such as oat bran). These lower overall cholesterol.  Be active    Choose an activity you enjoy. Walking, swimming, and riding a bike are some good ways to be active.    Start at a level where you feel comfortable. Increase your time and pace a little each week.    Work up to 30 to 40 minutes of moderate to high intensity physical activity at least 3 to 4 days per week.    Remember, some activity is better than none.    If you haven't been exercising regularly, start slowly. Check with your healthcare provider to make sure the exercise plan is right for you.  Quit smoking  Quitting smoking can improve your lipid levels. It also lowers your risk for heart disease and stroke.  Manage your weight  If you are overweight or obese, your healthcare provider will work with you to lose weight and lower your BMI (body mass index) to a normal or near-normal level. Making diet changes and increasing physical activity can help.  Take medicine as directed  Many people need medicine to get their LDL levels to a safe level. Medicine to lower cholesterol levels is effective and safe. Taking medicine is not a substitute for exercise or watching your diet! Your healthcare provider can tell you whether you might benefit from a cholesterol-lowering medicine.  Date Last Reviewed: 6/1/2017 2000-2018 [employer]. [address] [place] [address], [place] [address]. All rights  reserved. This information is not intended as a substitute for professional medical care. Always follow your healthcare professional's instructions.

## 2019-11-26 NOTE — LETTER
November 27, 2019      Sea Solis  [address]  [place] [address]        Dear Sea Solis    The screen for infection was negative or normal.      Enclosed is a copy of the results.  It was a pleasure to see you at your last appointment.    If you have any questions or concerns, please call myself or my nurse at [phone].      Sincerely,      Bisi Oakley MD, MPH /AVA Chou MA      Results for orders placed or performed in visit on 11/26/19   UA reflex to Microscopic and Culture     Status: None   Result Value Ref Range    Color Urine Yellow     Appearance Urine Clear     Glucose Urine Negative NEG^Negative mg/dL    Bilirubin Urine Negative NEG^Negative    Ketones Urine Negative NEG^Negative mg/dL    Specific Gravity Urine 1.025 1.003 - 1.035    Blood Urine Negative NEG^Negative    pH Urine 6.5 5.0 - 7.0 pH    Protein Albumin Urine Negative NEG^Negative mg/dL    Urobilinogen Urine 0.2 0.2 - 1.0 EU/dL    Nitrite Urine Negative NEG^Negative    Leukocyte Esterase Urine Negative NEG^Negative    Source Midstream Urine    Chlamydia trachomatis PCR     Status: None   Result Value Ref Range    Specimen Description Urine     Chlamydia Trachomatis PCR Negative NEG^Negative   Neisseria gonorrhoeae PCR     Status: None   Result Value Ref Range    Specimen Descrip Urine     N Gonorrhea PCR Negative NEG^Negative

## 2019-11-27 DIAGNOSIS — E55.9 VITAMIN D DEFICIENCY: ICD-10-CM

## 2019-11-27 DIAGNOSIS — Z13.1 SCREENING FOR DIABETES MELLITUS: ICD-10-CM

## 2019-11-27 DIAGNOSIS — E78.5 HYPERLIPIDEMIA LDL GOAL <160: Primary | ICD-10-CM

## 2019-11-27 DIAGNOSIS — E78.5 HYPERLIPIDEMIA, UNSPECIFIED HYPERLIPIDEMIA TYPE: ICD-10-CM

## 2019-11-27 DIAGNOSIS — G47.9 SLEEP DISTURBANCE: ICD-10-CM

## 2019-11-27 LAB
C TRACH DNA SPEC QL NAA+PROBE: NEGATIVE
CHOLEST SERPL-MCNC: 317 MG/DL
GLUCOSE BLD-MCNC: 87 MG/DL (ref 70–99)
HDLC SERPL-MCNC: 60 MG/DL
LDLC SERPL CALC-MCNC: 229 MG/DL
N GONORRHOEA DNA SPEC QL NAA+PROBE: NEGATIVE
NONHDLC SERPL-MCNC: 257 MG/DL
SPECIMEN SOURCE: NORMAL
SPECIMEN SOURCE: NORMAL
TRIGL SERPL-MCNC: 138 MG/DL

## 2019-11-27 PROCEDURE — 80061 LIPID PANEL: CPT | Performed by: PHYSICIAN ASSISTANT

## 2019-11-27 PROCEDURE — 36415 COLL VENOUS BLD VENIPUNCTURE: CPT | Performed by: PHYSICIAN ASSISTANT

## 2019-11-27 PROCEDURE — 82947 ASSAY GLUCOSE BLOOD QUANT: CPT | Performed by: PHYSICIAN ASSISTANT

## 2019-11-27 PROCEDURE — 82306 VITAMIN D 25 HYDROXY: CPT | Performed by: PHYSICIAN ASSISTANT

## 2019-11-27 NOTE — RESULT ENCOUNTER NOTE
Dear Sea Solis,    Your test results are attached.    The screen for infection was negative or normal.     Please call me if you have any questions about these test results or about your care.    Sincerely,    Bisi Oakley MD

## 2019-11-27 NOTE — LETTER
December 3, 2019        Sea Dom[facility]nguezed  [address]  [place] [address]        Dear Sea,    As we have discuss, cholesterol is very high. This can increase his risk for cardiovascular disease in the future. Recommend patient start a cholesterol medication, atorvastatin, daily to help lower the cholesterol. Vitamin D level was also low. Vitamin D supplement has been sent to take daily. Please follow up in 1-2 months for cholesterol recheck.    We were unsuccessful in contacting you by phone to relay the additional results as requested. The urine and screening tests for STD were completely normal and did not show any infection or other signs of problems.     Further questions, feel free contact the office at [phone].    Sincerely,      Bisi Oakley MD/maranda            Resulted Orders   Vitamin D Deficiency   Result Value Ref Range    Vitamin D Deficiency screening 12 (L) 20 - 75 ug/L      Comment:      Season, race, dietary intake, and treatment affect the concentration of   25-hydroxy-Vitamin D. Values may decrease during winter months and increase   during summer months. Values 20-29 ug/L may indicate Vitamin D insufficiency   and values <20 ug/L may indicate Vitamin D deficiency.  Vitamin D determination is routinely performed by an immunoassay specific for   25 hydroxyvitamin D3.  If an individual is on vitamin D2 (ergocalciferol)   supplementation, please specify 25 OH vitamin D2 and D3 level determination by   LCMSMS test VITD23.     Glucose whole blood   Result Value Ref Range    Glucose Whole Blood 87 70 - 99 mg/dL   Lipid panel reflex to direct LDL Fasting   Result Value Ref Range    Cholesterol 317 (H) <200 mg/dL      Comment:      Desirable:       <200 mg/dl    Triglycerides 138 <150 mg/dL      Comment:      Fasting specimen    HDL Cholesterol 60 >39 mg/dL    LDL Cholesterol Calculated 229 (H) <100 mg/dL      Comment:      Above desirable:  100-129 mg/dl  Borderline High:  130-159  mg/dL  High:             160-189 mg/dL  Very high:       >189 mg/dl      Non HDL Cholesterol 257 (H) <130 mg/dL      Comment:      Above Desirable:  130-159 mg/dl  Borderline high:  160-189 mg/dl  High:             190-219 mg/dl  Very high:       >219 mg/dl           Urine test  Exam Date Exam Time Accession # Results    11/26/19 12:45 PM [id number]    Specimen Information: Midstream Urine        Component Value Flag Ref Range Units Status Collected Lab   Color Urine Yellow     Final 11/26/2019 12:45 PM BK   Appearance Urine Clear     Final 11/26/2019 12:45 PM BK   Glucose Urine Negative   NEG^Negative mg/dL Final 11/26/2019 12:45 PM BK   Bilirubin Urine Negative   NEG^Negative  Final 11/26/2019 12:45 PM BK   Ketones Urine Negative   NEG^Negative mg/dL Final 11/26/2019 12:45 PM BK   Specific Gravity Urine 1.025   1.003 - 1.035  Final 11/26/2019 12:45 PM BK   Blood Urine Negative   NEG^Negative  Final 11/26/2019 12:45 PM BK   pH Urine 6.5   5.0 - 7.0 pH Final 11/26/2019 12:45 PM BK   Protein Albumin Urine Negative   NEG^Negative mg/dL Final 11/26/2019 12:45 PM BK   Urobilinogen Urine 0.2   0.2 - 1.0 EU/dL Final 11/26/2019 12:45 PM BK   Nitrite Urine Negative   NEG^Negative  Final 11/26/2019 12:45 PM BK   Leukocyte Esterase Urine Negative   NEG^Negative  Final 11/26/2019 12:45 PM BK   Source     Final 11/26/2019 12:45 PM BK   Midstream Urine           STD test  Exam Date Exam Time Accession # Results    11/26/19 12:40 PM [id number]    Specimen Information: Urine        Component Value Flag Ref Range Units Status Collected Lab   Specimen Description Urine     Final 11/26/2019 12:40 PM BK   Chlamydia Trachomatis PCR Negative   NEG^Negative  Final 11/26/2019 12:40    Comment:   Negative for C. trachomatis rRNA by transcription mediated amplification.   A negative result by transcription mediated amplification does not preclude   the presence of C. trachomatis infection because results are dependent on    proper and adequate collection, absence of inhibitors, and sufficient rRNA to   be detected.        Exam Date Exam Time Accession # Results    11/26/19 12:40 PM [id number]    Specimen Information: Urine        Component Value Flag Ref Range Units Status Collected Lab   Specimen Descrip Urine     Final 11/26/2019 12:40 PM BK   N Gonorrhea PCR Negative   NEG^Negative  Final 11/26/2019 12:40    Comment:   Negative for N. gonorrhoeae rRNA by transcription mediated amplification.   A negative result by transcription mediated amplification does not preclude   the presence of N. gonorrhoeae infection because results are dependent on   proper and adequate collection, absence of inhibitors, and sufficient rRNA to   be detected.

## 2019-11-29 LAB — DEPRECATED CALCIDIOL+CALCIFEROL SERPL-MC: 12 UG/L (ref 20–75)

## 2019-11-29 RX ORDER — CHOLECALCIFEROL (VITAMIN D3) 50 MCG
1 TABLET ORAL DAILY
Qty: 90 TABLET | Refills: 3 | Status: SHIPPED | OUTPATIENT
Start: 2019-11-29 | End: 2020-04-06

## 2019-11-29 RX ORDER — ATORVASTATIN CALCIUM 20 MG/1
20 TABLET, FILM COATED ORAL DAILY
Qty: 90 TABLET | Refills: 3 | Status: SHIPPED | OUTPATIENT
Start: 2019-11-29 | End: 2020-11-27

## 2019-12-01 PROBLEM — E55.9 VITAMIN D DEFICIENCY: Status: ACTIVE | Noted: 2019-12-01

## 2019-12-03 ENCOUNTER — NURSE TRIAGE (OUTPATIENT)
Dept: NURSING | Facility: CLINIC | Age: 39
End: 2019-12-03

## 2019-12-04 NOTE — TELEPHONE ENCOUNTER
Patient is calling back regarding a message from clinic staff related to lab results.  Notified him that the urine and STD labs were normal, per provider note.      Aditi Woods RN on 12/3/2019 at 7:30 PM      Reason for Disposition    [1] Follow-up call to recent contact AND [2] information only call, no triage required    Protocols used: INFORMATION ONLY CALL-A-

## 2020-02-21 ENCOUNTER — NURSE TRIAGE (OUTPATIENT)
Dept: NURSING | Facility: CLINIC | Age: 40
End: 2020-02-21

## 2020-02-21 NOTE — TELEPHONE ENCOUNTER
"Patient states has a derm problem. Describes a \"bump\" on his penis x 4.5 months.  Currently denies pain. No blister formation.   Denies drainage or discharge.  No rash.  No bleeding from penis or with voiding.  Afebrile by report.    States PCP has been at [facility] and would like to be seen at [facility].    Protocol-  Penis & Scrotum Symptoms- Adult  Care advice reviewed.   Disposition-  See PCP within 3 days  Caller states understanding of the recommended disposition.   Caller plans to go to  at .  Advised to call back if further questions or concerns.     BELLA Aldana RN  [facility]     Reason for Disposition    All other penis - scrotum symptoms  (Exception: painless rash < 24 hours duration)    Additional Information    Negative: [1] Blood from end of penis AND [2] large amount    Negative: [1] Not circumcised AND [2] foreskin pulled back and stuck    Negative: [1] Looks infected (e.g., draining sore, ulcer, rash is painful to touch) AND [2] fever > 100.5 F (38.1 C)    Negative: [1] Unable to urinate (or only a few drops) > 4 hours AND     [2] bladder feels very full (e.g., palpable bladder or strong urge to urinate)    Negative: [1] Erection AND [2] present > 4 hours    Negative: Patient sounds very sick or weak to the triager    Negative: [1] Pus or bloody discharge from the end of the penis AND [2] fever    Negative: [1] Pain or burning with passing urine AND [2] fever > 100.5 F (38.1 C)    Negative: [1] Pain or burning with passing urine AND [2] side (flank) or back pain present    Negative: Entire penis is swollen (i.e., edema)    Negative: [1] Antibiotic treatment > 3 days for STD (e.g., penile discharge from gonorrhea, chlamydia)    AND [2] painful urination not improved    Negative: Pus (white, yellow) or bloody discharge from end of penis    Negative: Pain or burning with passing urine    Negative: [1] Not circumcised AND [2] swollen foreskin    Negative: [1] Tiny water " blisters rash AND [2] 3 or more    Negative: Looks infected (e.g., draining sore, ulcer, rash is painful to touch)    Negative: Blood in urine (red, pink, or tea-colored)    Negative: [1] Painless rash (e.g., redness, tiny bumps, sore) AND [2] present > 24 hours    Negative: Patient is worried about a sexually transmitted disease (STD)    Negative: Blood in semen    Protocols used: PENIS AND SCROTUM SYMPTOMS-A-AH

## 2020-03-30 ENCOUNTER — TELEPHONE (OUTPATIENT)
Dept: DERMATOLOGY | Facility: CLINIC | Age: 40
End: 2020-03-30

## 2020-03-30 NOTE — TELEPHONE ENCOUNTER
Health Call Center    Phone Message    May a detailed message be left on voicemail: yes     Reason for Call: Other: Patient was seen for cyst on chest, it it now gone. He would like to see if he had another cyst on his genital area. Please advise.  Please call before noon or tomorrow before noon. He works noon to 10 pm.    Action Taken: Message routed to:  Adult Clinics: Dermatology p [phone]

## 2020-03-31 NOTE — TELEPHONE ENCOUNTER
Pt called, No answer. Unable to leave a vm as phone rang continuously. RN will flag encounter for staff to try again..Ilana Flowers RN

## 2020-04-01 NOTE — TELEPHONE ENCOUNTER
"I spoke with Sea.  He was previously seen at [facility] dermatology for a keloid.  He is concerned that he has a \"similar cyst\" in the genital region.    I offered him a video visit as a new patient.  [employer] sign up initiated.  He will send a photo through [employer] as well.    Appt scheduled 4/6/20.    Inessa Steele RN    "

## 2020-04-06 ENCOUNTER — VIRTUAL VISIT (OUTPATIENT)
Dept: DERMATOLOGY | Facility: CLINIC | Age: 40
End: 2020-04-06
Payer: COMMERCIAL

## 2020-04-06 DIAGNOSIS — L72.9 CYST OF SKIN: Primary | ICD-10-CM

## 2020-04-06 PROCEDURE — 99202 OFFICE O/P NEW SF 15 MIN: CPT | Mod: TEL | Performed by: DERMATOLOGY

## 2020-04-06 NOTE — PROGRESS NOTES
"[facility]     Impression and Recommendations (Patient Counseled on the Following):    1: 3-4 mm white papulonodule, left penile shaft. 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 Discussed benign nature of lesion and that typically best treatment for these lesions would be surgical removal, likely limited benefit of ILK injections. Given his prior history of keloid formation with excision of EICs on other areas of the body, patient would prefer to trial ILK injections prior to pursuing surgery. Recommended in-person evaluation in 3 months for evaluation and to discuss possible surgical excision versus ILK at that visit. Patient expressed understanding and agreed to plan.     Follow-up:   Follow-up with dermatology in approximately 3 months. Earlier for new or changing lesions or rash.      Staff only:  Zachery Wolff MD  Pronouns: he/him/his    Department of Dermatology  [facility]: Phone: [phone], Fax:[phone]  [facility]: Phone: [phone] Fax: [phone]    _____________________________________________________________________________    Dermatology Problem List:  1. Hx EICs, s/p ILK and excision. Complicated by keloid formation.   2. Penile shaft papulonodule. 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     Encounter Date: Apr 6, 2020    CC:   Chief Complaint   Patient presents with     RECHECK     cyst       History of Present Illness:  I have reviewed the teledermatology  information and the nursing intake corresponding to this issue. Sea Solis is a 39 year old male who presents via teledermatology for \"cysts\" on the penis.     He reports a 6-sherly history of of two hard \"lumps\" on his left penile shaft. Lesions are not tender, bleeding or pruritic. He reports have numerous similar appearing lesions (EICs) on the chest " and arms. Has prior had ILK injections to many of these spots with improvement. He has had a few lesions removed surgically on the arm and subprapubic area that were complicated by keloid formation. Health otherwise stable. No other skin concerns.     ROS: Patient is generally feeling well today.    Physical Examination:  General: Well-appearing, appropriately-developed individual.  Skin: Focused examination of the genital area within the teledermatology photograph(s)* was performed.   - On the left lateral penile shaft near base of penis, there is a 3-4 mm white papulonodule without significant overlying skin changes. No punctum appreciated.     Labs: None.     Past Medical History:   Patient Active Problem List   Diagnosis     Latent tuberculosis     Erectile dysfunction, unspecified erectile dysfunction type     Hyperlipidemia     Tension headache     Sleep disturbance     Vitamin D deficiency     Past Medical History:   Diagnosis Date     Hyperlipidemia 3/21/2018     No past surgical history on file.      Family History:  No family history on file.    Medications:  Current Outpatient Medications   Medication     atorvastatin (LIPITOR) 20 MG tablet     sildenafil (VIAGRA) 50 MG tablet     ibuprofen (ADVIL/MOTRIN) 800 MG tablet     No current facility-administered medications for this visit.           No Known Allergies      ___________________________________________________________________________    Teledermatology information:  - Location of patient: Home  - Patient presented as: return  - [facility] teledermatologist[facility]  - Reason teledermatology is appropriate:  of National Emergency Regarding Coronavirus disease (COVID 19) Outbreak  - Method of transmission:  Store and Forward ((National Emergency Concerning the CORONAVIRUS (COVID 19) and Telephone  - Image quality and interpretability: acceptable  - Physician has received verbal consent for a Video/Photos Visit from the  patient? Yes  - In-person dermatology visit recommendation: no  - Date of images: 4/2/2020  - Service start time:9:13 AM  - Service end time: 9:24 AM  - Date of report: 04/06/20

## 2020-04-06 NOTE — NURSING NOTE
"Teledermatology Nurse Call for RETURN patients seen within the last 3 years:    The patient was contacted by phone and we reviewed, \"Due to the coronavirus pandemic, we are calling to review your visit and offer you a teledermatology visit where you send in photos via [employer]. These photos will be seen by an MD or GALILEO. This will be billed to you and your insurance.\"  The patient was also told that \"a teledermatology visit is not as thorough as an in-person visit and that the quality of the photograph sent may not be of the same quality as that taken by the dermatology clinic, but the patient would like to proceed with an teledermatology because of National Emergency Regarding Coronavirus disease (COVID 19) Outbreak.\"  \"If a prescription is necessary we can send it directly to your pharmacy.  If lab work is needed we can place an order for that and you can then stop by our lab to have the test done at a later time.\"    The patient understood that they may receive a call from the clinic to review additional history, may still be instructed to come to clinic even after photo review and be billed for both visits with an MD. They were told that a photo assessment does not replace an in person skin exam. The patient understood that teledermatology is not for urgent issues and would require up to 3 business days for review. The patient denied skin pain, fever, mucosal symptoms (lesions, blisters, sores in the mouth, nose, eyes, or genitals)  IF PATIENT ENDORSES ANY OF THESE STOP AND PAGE  ON CALL ATTENDING. IF OTHER POSSIBLY URGENT SYMPTOMS THEN PAGE PHYSICIAN YOU ARE SCHEDULING WITH OR ON CALL IF NO ANSWER.       The patient chose to:                                                                                                                                                                                                                    Consent to a teledermatology visit with [employer] photos. The patient understood " they must upload a [facility] labshart photo for this visit to be completed. They indicated that the photo will be taken at their home address(if other address please document here). Patient told nursing these are already uploaded .  The patient was instructed to take photos of all all areas of concern and all areas of any rash from near and far away.                                                                                                                                                                                                                                                                                                                                                                                                                                      Patient concerns for this return visit: Cyst    Nursing tasks completed  -Pharmacy preference was updated.  -The nurse has dropped in the AVS information (For adults the phrase is umdermhteleavs and for pediatrics it is their own) for the physician to route in the AVS.                                                                                                                                                                                                                         -The patient was told to contact the clinic if they have not received correspondence within 72 hours.

## 2020-04-06 NOTE — Clinical Note
Could you call patient to make in-person visit in 3 months to discuss removal of genital calcified cysts. Thanks!    Zachery Wolff MD  Pronouns: he/him/his    Department of Dermatology  [facility]: Phone: [phone], Fax:[phone]  [facility]: Phone: [phone] Fax: [phone]

## 2020-04-06 NOTE — PATIENT INSTRUCTIONS
[facility] Teledermatology Visit    Thank you for allowing us to participate in your care. Your findings, instructions and follow-up plan are as follows:  - The spot on the penile shaft appears consistent with calcinosis cutis - typically due to calcium deposition in a small cyst of the skin- these are common in the genital area.   - Let's make an appointment in about 3 months to discuss surgical removal versus intralesional steroids. My nurse will call you to arrange this.       When should I call my doctor?    If you are worsening or not improving, please, contact us or seek urgent care as noted below.     Who should I call with questions?    [facility]: [phone]     [facility]: [phone]    If this is a medical emergency and you are unable to reach an ER, Call [phone]

## 2020-04-28 ENCOUNTER — TELEPHONE (OUTPATIENT)
Dept: FAMILY MEDICINE | Facility: CLINIC | Age: 40
End: 2020-04-28

## 2020-04-28 NOTE — TELEPHONE ENCOUNTER
"Patient states has been feeling fatigue and reckless pain from back of head to both shoulders; mostly right.  Describes it as squeezing/crushing at base of head where meets neck.  States his saliva won't allow him to breath well at times; mucus is overflowing and he has to drink a lot in order to swallow it.  No fever.  No cough.  States most discomfort when gets home from work.  States not able to sleep with it. He says that when he has the pain in back of neck he has hard time breathing and has to take a deep breath to \"calm my mind\"   Patient reports has been occurring x3 weeks.  Occurs daily.  Feels it all the time except when at work. 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  Works a 8hr shift normal but hours reduced now due to COVID19.  No known exposure to COVID19.    He then states that he's felt it 6-8 months ago  Sleeps 8-9hr/night.  He states wakes up frequently during night due to the discomfort in his back of head/shoulder.  He states has weakness right arm and leg as well which also started 3 weeks ago.  Is able to walk normally on right leg and  glasses, etc as normal with right hand.  Denies facial drooping or weakness.  No word finding problems or speech concerns.  He states no chest pain.  He is wanting a face to face appointment next Monday with a provider at [facility]. Refusing virtual appointment.  States is not able to be seen sooner. Does not wish to be seen at [place] any longer.  Says [place] is closer.  Has no specific provider in mind.  I will need to see who is seeing patient's Monday and seek advisement.  Will need to call him back.  Fatemeh Terrazas RN      "

## 2020-04-28 NOTE — TELEPHONE ENCOUNTER
Reason for Call:  Other appointment and call back    Detailed comments: patient would like a call to discuss the feeling in his body and head, and to schedule a in clinic appointment.     Phone Number Patient can be reached at: Cell number on file:    Telephone Information:   Mobile [phone]       Best Time: anytime     Can we leave a detailed message on this number? YES    Call taken on 4/28/2020 at 1:18 PM by Micaela Delgadillo

## 2020-04-28 NOTE — TELEPHONE ENCOUNTER
Patient wanting to establish care with male provider at [facility].  Wanting OFFICE VISIT appointment Monday, 5/4/20.  Was seen at  [facility] last but we are closer.  See note regarding reason for visit below. May we put him on your schedule for Monday?  Fatemeh Terrazas RN

## 2020-04-30 NOTE — TELEPHONE ENCOUNTER
I called the patient @[phone] and his phone rang numerous times but did not go into voicemail.  Unable to leave a message.  Sveta Swain,

## 2020-04-30 NOTE — TELEPHONE ENCOUNTER
I spoke to the patient and I scheduled an appointment for him on 5/7/20.  I offered him an appointment on 5/4/20 but he declined because the available times did not work for him due to his work.  Sveta Swain,

## 2020-05-07 ENCOUNTER — OFFICE VISIT (OUTPATIENT)
Dept: FAMILY MEDICINE | Facility: CLINIC | Age: 40
End: 2020-05-07
Payer: COMMERCIAL

## 2020-05-07 VITALS
TEMPERATURE: 98.5 F | DIASTOLIC BLOOD PRESSURE: 64 MMHG | HEART RATE: 97 BPM | SYSTOLIC BLOOD PRESSURE: 122 MMHG | OXYGEN SATURATION: 99 %

## 2020-05-07 DIAGNOSIS — J02.9 PHARYNGITIS, UNSPECIFIED ETIOLOGY: Primary | ICD-10-CM

## 2020-05-07 DIAGNOSIS — J20.9 ACUTE BRONCHITIS WITH SYMPTOMS > 10 DAYS: ICD-10-CM

## 2020-05-07 DIAGNOSIS — F41.1 GAD (GENERALIZED ANXIETY DISORDER): ICD-10-CM

## 2020-05-07 LAB
DEPRECATED S PYO AG THROAT QL EIA: NEGATIVE
SPECIMEN SOURCE: NORMAL
SPECIMEN SOURCE: NORMAL
STREP GROUP A PCR: NOT DETECTED

## 2020-05-07 PROCEDURE — 87651 STREP A DNA AMP PROBE: CPT | Performed by: FAMILY MEDICINE

## 2020-05-07 PROCEDURE — 99214 OFFICE O/P EST MOD 30 MIN: CPT | Performed by: FAMILY MEDICINE

## 2020-05-07 PROCEDURE — 40001204 ZZHCL STATISTIC STREP A RAPID: Performed by: FAMILY MEDICINE

## 2020-05-07 RX ORDER — ESCITALOPRAM OXALATE 10 MG/1
10 TABLET ORAL DAILY
Qty: 30 TABLET | Refills: 1 | Status: SHIPPED | OUTPATIENT
Start: 2020-05-07 | End: 2020-11-27

## 2020-05-07 RX ORDER — AZITHROMYCIN 250 MG/1
TABLET, FILM COATED ORAL
Qty: 6 TABLET | Refills: 0 | Status: SHIPPED | OUTPATIENT
Start: 2020-05-07 | End: 2020-11-11

## 2020-05-07 ASSESSMENT — ANXIETY QUESTIONNAIRES
5. BEING SO RESTLESS THAT IT IS HARD TO SIT STILL: NOT AT ALL
2. NOT BEING ABLE TO STOP OR CONTROL WORRYING: MORE THAN HALF THE DAYS
GAD7 TOTAL SCORE: 9
3. WORRYING TOO MUCH ABOUT DIFFERENT THINGS: MORE THAN HALF THE DAYS
IF YOU CHECKED OFF ANY PROBLEMS ON THIS QUESTIONNAIRE, HOW DIFFICULT HAVE THESE PROBLEMS MADE IT FOR YOU TO DO YOUR WORK, TAKE CARE OF THINGS AT HOME, OR GET ALONG WITH OTHER PEOPLE: SOMEWHAT DIFFICULT
6. BECOMING EASILY ANNOYED OR IRRITABLE: SEVERAL DAYS
1. FEELING NERVOUS, ANXIOUS, OR ON EDGE: SEVERAL DAYS
7. FEELING AFRAID AS IF SOMETHING AWFUL MIGHT HAPPEN: NEARLY EVERY DAY

## 2020-05-07 ASSESSMENT — PATIENT HEALTH QUESTIONNAIRE - PHQ9
5. POOR APPETITE OR OVEREATING: NOT AT ALL
SUM OF ALL RESPONSES TO PHQ QUESTIONS 1-9: 7

## 2020-05-07 NOTE — PROGRESS NOTES
SUBJECTIVE:   Sea Solis is a 39 year old male presenting with a chief complaint of a sore throat and a cough.  The patient first noted the onset of symptoms was 12  day(s) ago.  The patient (or parent) reports that he first had symptoms of pain in the posterior neck and head . After that he started having symptoms of a cough. After that he started having symptoms of a sore throat. Other symptoms that followed include sneezing. 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 He reports that it is a belem environment. He wears a mask at work.     His throat is the worse symptom(s) at the present time    His stomach has been also been upset for a few day(s).        He (or parent) denies: fever.  He (or parent) denies: shortness of breath.  He (or parent) denies: rash.    The patient (or parent) reports that he had tried ibuprofen and it has been helpful.  The patient has the following predisposing factors for infection:None.    Patient Active Problem List   Diagnosis     Latent tuberculosis     Erectile dysfunction, unspecified erectile dysfunction type     Hyperlipidemia     Tension headache     Sleep disturbance     Vitamin D deficiency     Current Outpatient Medications   Medication Sig Dispense Refill     atorvastatin (LIPITOR) 20 MG tablet Take 1 tablet (20 mg) by mouth daily For cholesterol. (Patient not taking: Reported on 5/7/2020) 90 tablet 3     ibuprofen (ADVIL/MOTRIN) 800 MG tablet Take 1 tablet (800 mg) by mouth every 8 hours as needed for moderate pain (Patient not taking: Reported on 3/18/2019) 60 tablet 1     sildenafil (VIAGRA) 50 MG tablet Take 1 tablet (50 mg) by mouth daily as needed 30 min to 4 hrs before sex. Do not use with nitroglycerin, terazosin or doxazosin. (Patient not taking: Reported on 5/7/2020) 6 tablet 1     Social History     Tobacco Use     Smoking status: Former Smoker     Smokeless tobacco: Never Used    Substance Use Topics     Alcohol use: Yes     Alcohol/week: 0.0 standard drinks     Comment: 2-3x week           OBJECTIVE  :/64   Pulse 97   Temp 98.5  F (36.9  C)   SpO2 99%   GENERAL APPEARANCE: healthy, alert and no distress  EYES: EOMI,  PERRL, conjunctiva clear  HENT: ear canals and TM's normal.  Nose and mouth without ulcers, erythema or lesions  HENT: tonsillar erythema  NECK: supple, nontender, no lymphadenopathy  RESP: lungs clear to auscultation - no rales, rhonchi or wheezes  CV: regular rates and rhythm, normal S1 S2, no murmur noted  ABDOMEN:  soft, nontender, no HSM or masses and bowel sounds normal  NEURO: Normal strength and tone, sensory exam grossly normal,  normal speech and mentation  SKIN: no suspicious lesions or rashes    Results for orders placed or performed in visit on 05/07/20   Streptococcus A Rapid Scr w Reflx to PCR     Status: None    Specimen: Throat   Result Value Ref Range    Strep Specimen Description Throat     Streptococcus Group A Rapid Screen Negative NEG^Negative         ASSESSMENT:  Bronchitis    PLAN:  I recommended that the patient get lots of fluids and rest. and A prescription for zithromax was given    During the visit I did wear a mask the entire time I was in the exam room with the patient.    During the visit the patient did wear a mask while I was in the exam room with him  except when I was examining his nose and throat or doing the nasopharyngeal swab.  --------------------------------------------------------------------------------------------------------------------------------------  --------------------------------------------------------------------------------------------------------------------------------------  SUBJECTIVE:  Sea Solis is a 39 year old male who presents for a follow up evaluation of possible anxiety. The patient reports that his current symptoms include anxiety symptoms Excessive worry, Fatigue and irritability  . He  "reports that his kids have been home during the pandemic and he get irritable very easily.        NAVDEEP-7    Over the last 2 weeks, how often have you been bothered by the following problems?  (Use an \"x\" to indicate your answer) Not at all                (0) Several days                (1) More than half the days        (2) Nearly every day          (3)   1. Feeling nervous, anxious or on edge       2. Not being able to stop or control worrying       3. Worrying too much about different things       4. Trouble relaxing       5. Being so restless that it is hard to sit still       6. Becoming easily annoyed or irritable       7. Feeling afraid as if something awful might happen         Total_______    Cut points for:   Mild Anxiety =  5  Moderate= 10  Severe=  15    NAVDEEP-7 SCORE 5/7/2020   Total Score 9         PHQ 5/7/2020   PHQ-9 Total Score 7   Q9: Thoughts of better off dead/self-harm past 2 weeks Not at all           The patient reports that he has not attended mental health counseling for his current symptoms.  He does nothave a mental health appointment scheduled for the near future.    OBJECTIVE:  General: the patient had a anxious affect during the visit today.    ASSESSMENT:Generalized Anxiety Disorder (NAVDEEP)      PLAN:  We will start the patient on Lexapro (escitalopram) 10  mg daily.  I instructed the patient return to clinic for appointment in 3 weeks for a recheck. A virtual visit would be fine.         The patient was recommended to seek individual counseling through his insurance company as an adjunct treatment to the prescribed medications.      "

## 2020-05-07 NOTE — LETTER
[facility]  [address] [facility] [address]  Phone: [phone]    May 7, 2020        Sea Solis  [address] [facility] [address]          To whom it may concern:    RE: Sea Solis    Patient was seen and treated today at our clinic and missed work. He may need a few days to recover from his illness.    Please contact me for questions or concerns.      Sincerely,        Bowen Dietrich MD

## 2020-05-08 ASSESSMENT — ANXIETY QUESTIONNAIRES: GAD7 TOTAL SCORE: 9

## 2020-05-08 NOTE — RESULT ENCOUNTER NOTE
Sea,  I have reviewed the results of the laboratory tests that we recently ordered. All of the lab work performed was normal or considered normal for you.  Sincerely,   Bowen Dietrich MD

## 2020-05-12 ENCOUNTER — TELEPHONE (OUTPATIENT)
Dept: FAMILY MEDICINE | Facility: CLINIC | Age: 40
End: 2020-05-12

## 2020-05-12 NOTE — TELEPHONE ENCOUNTER
Patient called back-Patient states that he has painful keloids that he has seen Etelvina in the past for  Would like another steroid injection.  Appointment scheduled for tomorrow    Meche EDMONDS RN BSN  [facility]  [phone]  [facility]  [phone]

## 2020-05-12 NOTE — TELEPHONE ENCOUNTER
Called number listed- no voicemail- unable to leave a message.    Meche EDMONDSRN  [facility]  [phone]

## 2020-05-12 NOTE — TELEPHONE ENCOUNTER
"Reason for Call:  Other appointment    Detailed comments: Pt called this afternoon and would like to set up an appointment with Aditi Faustin for \"another injection\" he had gotten from her before. Please give pt a call to get this scheduled and for more details. Thank you.    Phone Number Patient can be reached at: Home number on file [phone] (home)    Best Time:     Can we leave a detailed message on this number? YES    Call taken on 5/12/2020 at 1:54 PM by Ashley Caldwell      "

## 2020-05-13 NOTE — TELEPHONE ENCOUNTER
CALLED PATIENT- RESCHEDULED TO NEXT WED.    Meche LANDRUMRN BSN  [facility]  [phone]

## 2020-05-13 NOTE — TELEPHONE ENCOUNTER
Pt had to cancel his appt with Etelvina due to  issues. He would like to reschedule this appt. Please call him back at [phone]. Thank you.  Dina Odonnell,

## 2020-05-20 ENCOUNTER — OFFICE VISIT (OUTPATIENT)
Dept: DERMATOLOGY | Facility: CLINIC | Age: 40
End: 2020-05-20
Payer: COMMERCIAL

## 2020-05-20 VITALS — DIASTOLIC BLOOD PRESSURE: 75 MMHG | SYSTOLIC BLOOD PRESSURE: 117 MMHG

## 2020-05-20 DIAGNOSIS — D40.8: Primary | ICD-10-CM

## 2020-05-20 DIAGNOSIS — L91.0 KELOID: ICD-10-CM

## 2020-05-20 PROCEDURE — 54100 BIOPSY OF PENIS: CPT | Performed by: PHYSICIAN ASSISTANT

## 2020-05-20 PROCEDURE — 88342 IMHCHEM/IMCYTCHM 1ST ANTB: CPT | Mod: TC | Performed by: PHYSICIAN ASSISTANT

## 2020-05-20 PROCEDURE — 88305 TISSUE EXAM BY PATHOLOGIST: CPT | Mod: TC | Performed by: PHYSICIAN ASSISTANT

## 2020-05-20 PROCEDURE — 11900 INJECT SKIN LESIONS </W 7: CPT | Mod: 51 | Performed by: PHYSICIAN ASSISTANT

## 2020-05-20 PROCEDURE — 99213 OFFICE O/P EST LOW 20 MIN: CPT | Mod: 25 | Performed by: PHYSICIAN ASSISTANT

## 2020-05-20 NOTE — PROGRESS NOTES
HPI:  Sea Solis is a 39 year old male patient here today for 3 new spots on penis .  Patient states this has been present for about 6 months ago. No new sexual partners.  Patient reports the following symptoms: bothersome .  Patient reports the following previous treatments: none.  Patient reports the following modifying factors: none.  Associated symptoms: none. Also has a keloid on left suprapubic area. Treated in the past with IL TAC with improvement.  Patient has no other skin complaints today.  Remainder of the HPI, Meds, PMH, Allergies, FH, and SH was reviewed in chart.      Past Medical History:   Diagnosis Date     Hyperlipidemia 3/21/2018       History reviewed. No pertinent surgical history.     History reviewed. No pertinent family history.    Social History     Socioeconomic History     Marital status:      Spouse name: Not on file     Number of children: Not on file     Years of education: Not on file     Highest education level: Not on file   Occupational History     Not on file   Social Needs     Financial resource strain: Not on file     Food insecurity     Worry: Not on file     Inability: Not on file     Transportation needs     Medical: Not on file     Non-medical: Not on file   Tobacco Use     Smoking status: Former Smoker     Smokeless tobacco: Never Used   Substance and Sexual Activity     Alcohol use: Yes     Alcohol/week: 0.0 standard drinks     Comment: 2-3x week     Drug use: Yes     Types: Marijuana     Comment: Occasional     Sexual activity: Yes     Partners: Female   Lifestyle     Physical activity     Days per week: Not on file     Minutes per session: Not on file     Stress: Not on file   Relationships     Social connections     Talks on phone: Not on file     Gets together: Not on file     Attends Adventism service: Not on file     Active member of club or organization: Not on file     Attends meetings of clubs or organizations: Not on file     Relationship status: Not  on file     Intimate partner violence     Fear of current or ex partner: Not on file     Emotionally abused: Not on file     Physically abused: Not on file     Forced sexual activity: Not on file   Other Topics Concern     Not on file   Social History Narrative     Not on file       Outpatient Encounter Medications as of 5/20/2020   Medication Sig Dispense Refill     atorvastatin (LIPITOR) 20 MG tablet Take 1 tablet (20 mg) by mouth daily For cholesterol. (Patient not taking: Reported on 5/7/2020) 90 tablet 3     azithromycin (ZITHROMAX) 250 MG tablet Take 2 tablets on the 1st day and one tablet daily for the next 4 days 6 tablet 0     escitalopram (LEXAPRO) 10 MG tablet Take 1 tablet (10 mg) by mouth daily (Patient not taking: Reported on 5/20/2020) 30 tablet 1     ibuprofen (ADVIL/MOTRIN) 800 MG tablet Take 1 tablet (800 mg) by mouth every 8 hours as needed for moderate pain (Patient not taking: Reported on 3/18/2019) 60 tablet 1     sildenafil (VIAGRA) 50 MG tablet Take 1 tablet (50 mg) by mouth daily as needed 30 min to 4 hrs before sex. Do not use with nitroglycerin, terazosin or doxazosin. (Patient not taking: Reported on 5/7/2020) 6 tablet 1     No facility-administered encounter medications on file as of 5/20/2020.        Review Of Systems:  Skin: spots on penis  Eyes: negative  Ears/Nose/Throat: negative  Respiratory: No shortness of breath, dyspnea on exertion, cough, or hemoptysis  Cardiovascular: negative  Gastrointestinal: negative  Genitourinary: negative  Musculoskeletal: negative  Neurologic: negative  Psychiatric: negative  Hematologic/Lymphatic/Immunologic: negative  Endocrine: negative      Objective:     /75   Eyes: Conjunctivae/lids: Normal   ENT: Lips:  Normal  MSK: Normal  Cardiovascular: Peripheral edema none  Pulm: Breathing Normal  Neuro/Psych: Orientation: A/O x 3 Normal; Mood/Affect: Normal, NAD, WDWN  Pt accompanied by: self  Following areas examined: hands chest, left shoulder,  suprapubic, penis  Villafana skin type:v   Findings:  Firm smooth flesh colored plaque on left suprapubic  Smooth flesh colored perifollicular papule on ventral shaft of penis 0.2cm  Smooth flesh and red colored perifollicular papule and flesh colored nodule on ventral shaft of penis   Assessment and Plan:  1) 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  No complications and routine wound care.  May grow back and will get a scar. Based on lesion type may need to completely remove lesion. Patient will be notified in 7-10 days of results. Wound care directions given.    2) keloid  IL TAC: PGACAC discussed.  Risks including but not limited to injection site reaction, bruising, no resolution.  All questions answered and entertained to patient s satisfaction.  Informed consent obtained.  IL TAC in concentration of 20mg/ml was injected ID to one keloid.  Total injected was  0.2 ml.  Patient tolerated without complications and given wound care instructions, including not to move product around.  Return in 4 weeks for follow-up and possible additional IL TAC. May need additional treatment. May not be effective.    Lot #:[id number]  Exp: 4/21  Sodium chloride  Lot #:-[id number]  Exp: [id number]  Follow up in 1 month for steroid injection

## 2020-05-20 NOTE — LETTER
5/20/2020         RE: Sea Solis  [address] [facility] [address]        Dear Colleague,    Thank you for referring your patient, Sea Solis, to the [facility]. Please see a copy of my visit note below.    HPI:  Sea Solis is a 39 year old male patient here today for 3 new spots on penis .  Patient states this has been present for about 6 months ago. No new sexual partners.  Patient reports the following symptoms: bothersome .  Patient reports the following previous treatments: none.  Patient reports the following modifying factors: none.  Associated symptoms: none. Also has a keloid on left suprapubic area. Treated in the past with IL TAC with improvement.  Patient has no other skin complaints today.  Remainder of the HPI, Meds, PMH, Allergies, FH, and SH was reviewed in chart.      Past Medical History:   Diagnosis Date     Hyperlipidemia 3/21/2018       History reviewed. No pertinent surgical history.     History reviewed. No pertinent family history.    Social History     Socioeconomic History     Marital status:      Spouse name: Not on file     Number of children: Not on file     Years of education: Not on file     Highest education level: Not on file   Occupational History     Not on file   Social Needs     Financial resource strain: Not on file     Food insecurity     Worry: Not on file     Inability: Not on file     Transportation needs     Medical: Not on file     Non-medical: Not on file   Tobacco Use     Smoking status: Former Smoker     Smokeless tobacco: Never Used   Substance and Sexual Activity     Alcohol use: Yes     Alcohol/week: 0.0 standard drinks     Comment: 2-3x week     Drug use: Yes     Types: Marijuana     Comment: Occasional     Sexual activity: Yes     Partners: Female   Lifestyle     Physical activity     Days per week: Not on file     Minutes per session: Not on file     Stress: Not on file   Relationships     Social  connections     Talks on phone: Not on file     Gets together: Not on file     Attends Pentecostal service: Not on file     Active member of club or organization: Not on file     Attends meetings of clubs or organizations: Not on file     Relationship status: Not on file     Intimate partner violence     Fear of current or ex partner: Not on file     Emotionally abused: Not on file     Physically abused: Not on file     Forced sexual activity: Not on file   Other Topics Concern     Not on file   Social History Narrative     Not on file       Outpatient Encounter Medications as of 5/20/2020   Medication Sig Dispense Refill     atorvastatin (LIPITOR) 20 MG tablet Take 1 tablet (20 mg) by mouth daily For cholesterol. (Patient not taking: Reported on 5/7/2020) 90 tablet 3     azithromycin (ZITHROMAX) 250 MG tablet Take 2 tablets on the 1st day and one tablet daily for the next 4 days 6 tablet 0     escitalopram (LEXAPRO) 10 MG tablet Take 1 tablet (10 mg) by mouth daily (Patient not taking: Reported on 5/20/2020) 30 tablet 1     ibuprofen (ADVIL/MOTRIN) 800 MG tablet Take 1 tablet (800 mg) by mouth every 8 hours as needed for moderate pain (Patient not taking: Reported on 3/18/2019) 60 tablet 1     sildenafil (VIAGRA) 50 MG tablet Take 1 tablet (50 mg) by mouth daily as needed 30 min to 4 hrs before sex. Do not use with nitroglycerin, terazosin or doxazosin. (Patient not taking: Reported on 5/7/2020) 6 tablet 1     No facility-administered encounter medications on file as of 5/20/2020.        Review Of Systems:  Skin: spots on penis  Eyes: negative  Ears/Nose/Throat: negative  Respiratory: No shortness of breath, dyspnea on exertion, cough, or hemoptysis  Cardiovascular: negative  Gastrointestinal: negative  Genitourinary: negative  Musculoskeletal: negative  Neurologic: negative  Psychiatric: negative  Hematologic/Lymphatic/Immunologic: negative  Endocrine: negative      Objective:     /75   Eyes:  Conjunctivae/lids: Normal   ENT: Lips:  Normal  MSK: Normal  Cardiovascular: Peripheral edema none  Pulm: Breathing Normal  Neuro/Psych: Orientation: A/O x 3 Normal; Mood/Affect: Normal, NAD, WDWN  Pt accompanied by: self  Following areas examined: hands chest, left shoulder, suprapubic, penis  Villafana skin type:v   Findings:  Firm smooth flesh colored plaque on left suprapubic  Smooth flesh colored perifollicular papule on ventral shaft of penis 0.2cm  Smooth flesh and red colored perifollicular papule and flesh colored nodule on ventral shaft of penis   Assessment and Plan:  1) 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  No complications and routine wound care.  May grow back and will get a scar. Based on lesion type may need to completely remove lesion. Patient will be notified in 7-10 days of results. Wound care directions given.    2) keloid  IL TAC: PGACAC discussed.  Risks including but not limited to injection site reaction, bruising, no resolution.  All questions answered and entertained to patient s satisfaction.  Informed consent obtained.  IL TAC in concentration of 20mg/ml was injected ID to one keloid.  Total injected was  0.2 ml.  Patient tolerated without complications and given wound care instructions, including not to move product around.  Return in 4 weeks for follow-up and possible additional IL TAC. May need additional treatment. May not be effective.    Lot #:[id number]  Exp: 4/21  Sodium chloride  Lot #:-[id number]  Exp: [id number]  Follow up in 1 month for steroid injection      Again, thank you for allowing me to participate in the care of your patient.        Sincerely,        Aditi Faustin PA-C

## 2020-05-20 NOTE — PATIENT INSTRUCTIONS
Today we injected Kenalog. Kenalog is an antiinflammatory medication. We can do injections every four weeks as needed. Atrophy (thinning of the skin) and pigment changes can occur.               Wound Care Instructions     FOR SUPERFICIAL WOUNDS     [facility] [phone]    [facility] [phone]          AFTER 24 HOURS YOU SHOULD REMOVE THE BANDAGE AND BEGIN DAILY DRESSING CHANGES AS FOLLOWS:     1) Remove Dressing.     2) Clean and dry the area with tap water using a Q-tip or sterile gauze pad.     3) Apply Vaseline, Aquaphor, Polysporin ointment or Bacitracin ointment over entire wound.  Do NOT use Neosporin ointment.     4) Cover the wound with a band-aid, or a sterile non-stick gauze pad and micropore paper tape      REPEAT THESE INSTRUCTIONS AT LEAST ONCE A DAY UNTIL THE WOUND HAS COMPLETELY HEALED.    It is an old wives tale that a wound heals better when it is exposed to air and allowed to dry out. The wound will heal faster with a better cosmetic result if it is kept moist with ointment and covered with a bandage.    **Do not let the wound dry out.**      Supplies Needed:      *Cotton tipped applicators (Q-tips)    *Polysporin Ointment or Bacitracin Ointment (NOT NEOSPORIN)    *Band-aids or non-stick gauze pads and micropore paper tape.      PATIENT INFORMATION:    During the healing process you will notice a number of changes. All wounds develop a small halo of redness surrounding the wound.  This means healing is occurring. Severe itching with extensive redness usually indicates sensitivity to the ointment or bandage tape used to dress the wound.  You should call our office if this develops.      Swelling  and/or discoloration around your surgical site is common, particularly when performed around the eye.    All wounds normally drain.  The larger the wound the more drainage there will be.  After 7-10 days, you will notice the wound beginning to shrink and new skin will  begin to grow.  The wound is healed when you can see skin has formed over the entire area.  A healed wound has a healthy, shiny look to the surface and is red to dark pink in color to normalize.  Wounds may take approximately 4-6 weeks to heal.  Larger wounds may take 6-8 weeks.  After the wound is healed you may discontinue dressing changes.    You may experience a sensation of tightness as your wound heals. This is normal and will gradually subside.    Your healed wound may be sensitive to temperature changes. This sensitivity improves with time, but if you re having a lot of discomfort, try to avoid temperature extremes.    Patients frequently experience itching after their wound appears to have healed because of the continue healing under the skin.  Plain Vaseline will help relieve the itching.        POSSIBLE COMPLICATIONS    BLEEDIN. Leave the bandage in place.  2. Use tightly rolled up gauze or a cloth to apply direct pressure over the bandage for 30  minutes.  3. Reapply pressure for an additional 30 minutes if necessary  4. Use additional gauze and tape to maintain pressure once the bleeding has stopped.      Proper skin care from [facility]:    -Eliminate harsh soaps as they strip the natural oils from the skin, often resulting in dry itchy skin ( i.e. Dial, Zest, Shanna Spring)  -Use mild soaps such as Cetaphil or Dove Sensitive Skin in the shower. You do not need to use soap on arms, legs, and trunk every time you shower unless visibly soiled.   -Avoid hot or cold showers.  -After showering, lightly dry off and apply moisturizing within 2-3 minutes. This will help trap moisture in the skin.   -Aggressive use of a moisturizer at least 1-2 times a day to the entire body (including -Vanicream, Cetaphil, Aquaphor or Cerave) and moisturize hands after every washing.  -We recommend using moisturizers that come in a tub that needs to be scooped out, not a pump. This has more of an oil base. It  will hold moisture in your skin much better than a water base moisturizer. The above recommended are non-pore clogging.      Wear a sunscreen with at least SPF 30 on your face, ears, neck and V of the chest daily. Wear sunscreen on other areas of the body if those areas are exposed to the sun throughout the day. Sunscreens can contain physical and/or chemical blockers. Physical blockers are less likely to clog pores, these include zinc oxide and titanium dioxide. Reapply every two hour and after swimming. Sunscreen examples include Neutrogena, CeraVe, Blue Lizard, Elta MD and many others.    UV radiation  UVA radiation remains constant throughout the day and throughout the year. It is a longer wavelength than UVB and therefore penetrates deeper into the skin leading to immediate and delayed tanning, photoaging, and skin cancer. 70-80% of UVA and UVB radiation occurs between the hours of 10am-2pm.  UVB radiation  UVB radiation causes the most harmful effects and is more significant during the summer months. However, snow and ice can reflect UVB radiation leading to skin damage during the winter months as well. UVB radiation is responsible for tanning, burning, inflammation, delayed erythema (pinkness), pigmentation (brown spots), and skin cancer.

## 2020-05-21 ENCOUNTER — MYC MEDICAL ADVICE (OUTPATIENT)
Dept: DERMATOLOGY | Facility: CLINIC | Age: 40
End: 2020-05-21

## 2020-05-22 ENCOUNTER — MYC MEDICAL ADVICE (OUTPATIENT)
Dept: DERMATOLOGY | Facility: CLINIC | Age: 40
End: 2020-05-22

## 2020-05-22 NOTE — TELEPHONE ENCOUNTER
[employer] message sent:    Jorge Osei-    You will apply ointment and a bandage daily until the affected area is fully healed.    Let me know if you have any other questions,    MONTANA Correa-BSN-N  [facility]  [phone]

## 2020-05-27 LAB — COPATH REPORT: NORMAL

## 2020-05-28 ENCOUNTER — TELEPHONE (OUTPATIENT)
Dept: DERMATOLOGY | Facility: CLINIC | Age: 40
End: 2020-05-28

## 2020-05-28 ENCOUNTER — MYC MEDICAL ADVICE (OUTPATIENT)
Dept: FAMILY MEDICINE | Facility: CLINIC | Age: 40
End: 2020-05-28

## 2020-05-28 ENCOUNTER — MYC MEDICAL ADVICE (OUTPATIENT)
Dept: DERMATOLOGY | Facility: CLINIC | Age: 40
End: 2020-05-28

## 2020-05-28 NOTE — TELEPHONE ENCOUNTER
Please see [employer] message and advise.      Thank you,  Meche EDMONDSRN BSN  [facility]  [phone]

## 2020-05-28 NOTE — TELEPHONE ENCOUNTER
It could be scar tissue from trauma or more of a scarring issue that results in tightening of the skin on the penis. The pathologist thought is was a hypertrophic scar ( thickened scar) similar to your keloid scar. This usually occurs from trauma ( pants rubbing on area, scratching, etc).       The spot we biopsied is healing so it may be swollen from that. Is it warm, tender, and/or do you have a fever or body aches?

## 2020-05-28 NOTE — TELEPHONE ENCOUNTER
Sent via [employer] as patient is using this to ask questions.    Meche LANDRUMRN BSN  [facility]  [phone]

## 2020-05-28 NOTE — TELEPHONE ENCOUNTER
----- Message from Aditi Faustin PA-C sent at 5/28/2020 12:35 PM CDT -----  Skin, ventral shaft of penis, shave:   - Dermal fibrosis - ( scar tissue)  If it is not worsening or causing pain we do not need to treat. There are certain scarring conditions that can be seen on the penis that we often treat with topical or injectable steroids.     We can move forward with the kenalog injections if you would like.

## 2020-06-01 NOTE — TELEPHONE ENCOUNTER
[employer] message sent:    Jorge Osei-    If you want to treat them, we can do either a topical steroid or steroid injection.    You seem to have had improvement of your keloid scars with steroid injection.    Etelvina can send a topical and/or you can schedule an in office visit for injections. If they are not really bothersome then no treatment is needed at this time. If they change please reach out.     Thanks,    MONTANA Correa-BSN-PHN  [facility]  [phone]

## 2020-06-01 NOTE — TELEPHONE ENCOUNTER
At this point Sea if you want to treat them then we can do either a topical steroid or steroid injection.    You seem to have had improvement of your keloid scars with steroid injection.    I can send a topical and/or you can schedule an in office visit for injections. If they are not really bothersome then no treatment is needed at this time. If they change please reach out.

## 2020-06-22 ENCOUNTER — OFFICE VISIT (OUTPATIENT)
Dept: DERMATOLOGY | Facility: CLINIC | Age: 40
End: 2020-06-22

## 2020-06-22 VITALS — OXYGEN SATURATION: 99 % | DIASTOLIC BLOOD PRESSURE: 78 MMHG | SYSTOLIC BLOOD PRESSURE: 113 MMHG | HEART RATE: 71 BPM

## 2020-06-22 DIAGNOSIS — L90.5 SCAR: Primary | ICD-10-CM

## 2020-06-22 PROCEDURE — 11900 INJECT SKIN LESIONS </W 7: CPT | Performed by: PHYSICIAN ASSISTANT

## 2020-06-22 NOTE — PATIENT INSTRUCTIONS
Proper skin care from [facility]:    -Eliminate harsh soaps as they strip the natural oils from the skin, often resulting in dry itchy skin ( i.e. Dial, Zest, Shanna Spring)  -Use mild soaps such as Cetaphil or Dove Sensitive Skin in the shower. You do not need to use soap on arms, legs, and trunk every time you shower unless visibly soiled.   -Avoid hot or cold showers.  -After showering, lightly dry off and apply moisturizing within 2-3 minutes. This will help trap moisture in the skin.   -Aggressive use of a moisturizer at least 1-2 times a day to the entire body (including -Vanicream, Cetaphil, Aquaphor or Cerave) and moisturize hands after every washing.  -We recommend using moisturizers that come in a tub that needs to be scooped out, not a pump. This has more of an oil base. It will hold moisture in your skin much better than a water base moisturizer. The above recommended are non-pore clogging.      Wear a sunscreen with at least SPF 30 on your face, ears, neck and V of the chest daily. Wear sunscreen on other areas of the body if those areas are exposed to the sun throughout the day. Sunscreens can contain physical and/or chemical blockers. Physical blockers are less likely to clog pores, these include zinc oxide and titanium dioxide. Reapply every two hour and after swimming. Sunscreen examples include Neutrogena, CeraVe, Blue Lizard, Elta MD and many others.    UV radiation  UVA radiation remains constant throughout the day and throughout the year. It is a longer wavelength than UVB and therefore penetrates deeper into the skin leading to immediate and delayed tanning, photoaging, and skin cancer. 70-80% of UVA and UVB radiation occurs between the hours of 10am-2pm.  UVB radiation  UVB radiation causes the most harmful effects and is more significant during the summer months. However, snow and ice can reflect UVB radiation leading to skin damage during the winter months as well. UVB radiation is  responsible for tanning, burning, inflammation, delayed erythema (pinkness), pigmentation (brown spots), and skin cancer.     Today we injected Kenalog. Kenalog is an antiinflammatory medication. We can do injections every four weeks as needed. Atrophy (thinning of the skin) and pigment changes can occur.

## 2020-06-22 NOTE — PROGRESS NOTES
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  Patient states this has been present for a while.  Patient reports the following symptoms: bothersome .  Patient reports the following previous treatments: none. Tangential biopsy.  Patient reports the following modifying factors: none.  Associated symptoms: none.  Patient has no other skin complaints today.  Remainder of the HPI, Meds, PMH, Allergies, FH, and SH was reviewed in chart.    Pertinent Hx:   Hx of keloids   Past Medical History:   Diagnosis Date     Hyperlipidemia 3/21/2018       No past surgical history on file.     No family history on file.    Social History     Socioeconomic History     Marital status:      Spouse name: Not on file     Number of children: Not on file     Years of education: Not on file     Highest education level: Not on file   Occupational History     Not on file   Social Needs     Financial resource strain: Not on file     Food insecurity     Worry: Not on file     Inability: Not on file     Transportation needs     Medical: Not on file     Non-medical: Not on file   Tobacco Use     Smoking status: Former Smoker     Smokeless tobacco: Never Used   Substance and Sexual Activity     Alcohol use: Yes     Alcohol/week: 0.0 standard drinks     Comment: 2-3x week     Drug use: Yes     Types: Marijuana     Comment: Occasional     Sexual activity: Yes     Partners: Female   Lifestyle     Physical activity     Days per week: Not on file     Minutes per session: Not on file     Stress: Not on file   Relationships     Social connections     Talks on phone: Not on file     Gets together: Not on file     Attends Zoroastrianism service: Not on file     Active member of club or organization: Not on file     Attends meetings of clubs or organizations: Not on file     Relationship status: Not on file     Intimate partner violence     Fear of current or ex  partner: Not on file     Emotionally abused: Not on file     Physically abused: Not on file     Forced sexual activity: Not on file   Other Topics Concern     Not on file   Social History Narrative     Not on file       Outpatient Encounter Medications as of 6/22/2020   Medication Sig Dispense Refill     atorvastatin (LIPITOR) 20 MG tablet Take 1 tablet (20 mg) by mouth daily For cholesterol. (Patient not taking: Reported on 5/7/2020) 90 tablet 3     azithromycin (ZITHROMAX) 250 MG tablet Take 2 tablets on the 1st day and one tablet daily for the next 4 days 6 tablet 0     escitalopram (LEXAPRO) 10 MG tablet Take 1 tablet (10 mg) by mouth daily (Patient not taking: Reported on 5/20/2020) 30 tablet 1     ibuprofen (ADVIL/MOTRIN) 800 MG tablet Take 1 tablet (800 mg) by mouth every 8 hours as needed for moderate pain (Patient not taking: Reported on 3/18/2019) 60 tablet 1     sildenafil (VIAGRA) 50 MG tablet Take 1 tablet (50 mg) by mouth daily as needed 30 min to 4 hrs before sex. Do not use with nitroglycerin, terazosin or doxazosin. (Patient not taking: Reported on 5/7/2020) 6 tablet 1     No facility-administered encounter medications on file as of 6/22/2020.        Review Of Systems:  Skin: scar on penis  Eyes: negative  Ears/Nose/Throat: negative  Respiratory: No shortness of breath, dyspnea on exertion, cough, or hemoptysis  Cardiovascular: negative  Gastrointestinal: negative  Genitourinary: negative  Musculoskeletal: negative  Neurologic: negative  Psychiatric: negative  Hematologic/Lymphatic/Immunologic: negative  Endocrine: negative      Objective:     There were no vitals taken for this visit.  Eyes: Conjunctivae/lids: Normal   ENT: Lips:  Normal  MSK: Normal  Cardiovascular: Peripheral edema none  Pulm: Breathing Normal  Neuro/Psych: Orientation: A/O x 3 Normal; Mood/Affect: Normal, NAD, WDWN  Pt accompanied by: self  Following areas examined: Face ( patient is wearing face mask), neck, hands,  penis    Villafana skin type:v   Findings:  Pink smooth papule on distal ventral shaft of penis. 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  Risks including but not limited to injection site reaction, bruising, no resolution.  All questions answered and entertained to patient s satisfaction.  Informed consent obtained.  IL TAC in concentration of 20mg/ml was injected ID to 2 lesions on penis.  Total injected was  0.3 ml.  Patient tolerated without complications and given wound care instructions, including not to move product around.  Return in 4 weeks for follow-up and possible additional IL TAC. May need additional treatment. May not be effective.    Lot #:[id number]  Exp: 4/21  Sodium chloride  Lot #:[id number]  Exp: [id number]    Follow up in 1 month

## 2020-06-22 NOTE — LETTER
6/22/2020         RE: Sea Solis  [address] [address] [facility] [address]        Dear Colleague,    Thank you for referring your patient, Sea Solis, to the [facility]. Please see a copy of my visit note below.    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  Patient states this has been present for a while.  Patient reports the following symptoms: bothersome .  Patient reports the following previous treatments: none. Tangential biopsy.  Patient reports the following modifying factors: none.  Associated symptoms: none.  Patient has no other skin complaints today.  Remainder of the HPI, Meds, PMH, Allergies, FH, and SH was reviewed in chart.    Pertinent Hx:   Hx of keloids   Past Medical History:   Diagnosis Date     Hyperlipidemia 3/21/2018       No past surgical history on file.     No family history on file.    Social History     Socioeconomic History     Marital status:      Spouse name: Not on file     Number of children: Not on file     Years of education: Not on file     Highest education level: Not on file   Occupational History     Not on file   Social Needs     Financial resource strain: Not on file     Food insecurity     Worry: Not on file     Inability: Not on file     Transportation needs     Medical: Not on file     Non-medical: Not on file   Tobacco Use     Smoking status: Former Smoker     Smokeless tobacco: Never Used   Substance and Sexual Activity     Alcohol use: Yes     Alcohol/week: 0.0 standard drinks     Comment: 2-3x week     Drug use: Yes     Types: Marijuana     Comment: Occasional     Sexual activity: Yes     Partners: Female   Lifestyle     Physical activity     Days per week: Not on file     Minutes per session: Not on file     Stress: Not on file   Relationships     Social connections     Talks on phone: Not on file     Gets together: Not on  file     Attends Latter-day service: Not on file     Active member of club or organization: Not on file     Attends meetings of clubs or organizations: Not on file     Relationship status: Not on file     Intimate partner violence     Fear of current or ex partner: Not on file     Emotionally abused: Not on file     Physically abused: Not on file     Forced sexual activity: Not on file   Other Topics Concern     Not on file   Social History Narrative     Not on file       Outpatient Encounter Medications as of 6/22/2020   Medication Sig Dispense Refill     atorvastatin (LIPITOR) 20 MG tablet Take 1 tablet (20 mg) by mouth daily For cholesterol. (Patient not taking: Reported on 5/7/2020) 90 tablet 3     azithromycin (ZITHROMAX) 250 MG tablet Take 2 tablets on the 1st day and one tablet daily for the next 4 days 6 tablet 0     escitalopram (LEXAPRO) 10 MG tablet Take 1 tablet (10 mg) by mouth daily (Patient not taking: Reported on 5/20/2020) 30 tablet 1     ibuprofen (ADVIL/MOTRIN) 800 MG tablet Take 1 tablet (800 mg) by mouth every 8 hours as needed for moderate pain (Patient not taking: Reported on 3/18/2019) 60 tablet 1     sildenafil (VIAGRA) 50 MG tablet Take 1 tablet (50 mg) by mouth daily as needed 30 min to 4 hrs before sex. Do not use with nitroglycerin, terazosin or doxazosin. (Patient not taking: Reported on 5/7/2020) 6 tablet 1     No facility-administered encounter medications on file as of 6/22/2020.        Review Of Systems:  Skin: scar on penis  Eyes: negative  Ears/Nose/Throat: negative  Respiratory: No shortness of breath, dyspnea on exertion, cough, or hemoptysis  Cardiovascular: negative  Gastrointestinal: negative  Genitourinary: negative  Musculoskeletal: negative  Neurologic: negative  Psychiatric: negative  Hematologic/Lymphatic/Immunologic: negative  Endocrine: negative      Objective:     There were no vitals taken for this visit.  Eyes: Conjunctivae/lids: Normal   ENT: Lips:  Normal  MSK:  Normal  Cardiovascular: Peripheral edema none  Pulm: Breathing Normal  Neuro/Psych: Orientation: A/O x 3 Normal; Mood/Affect: Normal, NAD, WDWN  Pt accompanied by: self  Following areas examined: Face ( patient is wearing face mask), neck, hands, penis    Villafana skin type:v   Findings:  Pink smooth papule on distal ventral shaft of penis. 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  Risks including but not limited to injection site reaction, bruising, no resolution.  All questions answered and entertained to patient s satisfaction.  Informed consent obtained.  IL TAC in concentration of 20mg/ml was injected ID to 2 lesions on penis.  Total injected was  0.3 ml.  Patient tolerated without complications and given wound care instructions, including not to move product around.  Return in 4 weeks for follow-up and possible additional IL TAC. May need additional treatment. May not be effective.    Lot #:[id number]  Exp: 4/21  Sodium chloride  Lot #:[id number]  Exp: [id number]    Follow up in 1 month      Again, thank you for allowing me to participate in the care of your patient.        Sincerely,        Aditi Faustin PA-C

## 2020-11-11 ENCOUNTER — OFFICE VISIT (OUTPATIENT)
Dept: DERMATOLOGY | Facility: CLINIC | Age: 40
End: 2020-11-11

## 2020-11-11 VITALS — HEART RATE: 69 BPM | DIASTOLIC BLOOD PRESSURE: 68 MMHG | OXYGEN SATURATION: 99 % | SYSTOLIC BLOOD PRESSURE: 116 MMHG

## 2020-11-11 DIAGNOSIS — L91.0 KELOID: Primary | ICD-10-CM

## 2020-11-11 PROCEDURE — 11900 INJECT SKIN LESIONS </W 7: CPT | Performed by: PHYSICIAN ASSISTANT

## 2020-11-11 PROCEDURE — 99207 PR DROP WITH A PROCEDURE: CPT | Performed by: PHYSICIAN ASSISTANT

## 2020-11-11 NOTE — PATIENT INSTRUCTIONS
Proper skin care from [facility]:    -Eliminate harsh soaps as they strip the natural oils from the skin, often resulting in dry itchy skin ( i.e. Dial, Zest, Shanna Spring)  -Use mild soaps such as Cetaphil or Dove Sensitive Skin in the shower. You do not need to use soap on arms, legs, and trunk every time you shower unless visibly soiled.   -Avoid hot or cold showers.  -After showering, lightly dry off and apply moisturizing within 2-3 minutes. This will help trap moisture in the skin.   -Aggressive use of a moisturizer at least 1-2 times a day to the entire body (including -Vanicream, Cetaphil, Aquaphor or Cerave) and moisturize hands after every washing.  -We recommend using moisturizers that come in a tub that needs to be scooped out, not a pump. This has more of an oil base. It will hold moisture in your skin much better than a water base moisturizer. The above recommended are non-pore clogging.      Wear a sunscreen with at least SPF 30 on your face, ears, neck and V of the chest daily. Wear sunscreen on other areas of the body if those areas are exposed to the sun throughout the day. Sunscreens can contain physical and/or chemical blockers. Physical blockers are less likely to clog pores, these include zinc oxide and titanium dioxide. Reapply every two hour and after swimming. Sunscreen examples include Neutrogena, CeraVe, Blue Lizard, Elta MD and many others.    UV radiation  UVA radiation remains constant throughout the day and throughout the year. It is a longer wavelength than UVB and therefore penetrates deeper into the skin leading to immediate and delayed tanning, photoaging, and skin cancer. 70-80% of UVA and UVB radiation occurs between the hours of 10am-2pm.  UVB radiation  UVB radiation causes the most harmful effects and is more significant during the summer months. However, snow and ice can reflect UVB radiation leading to skin damage during the winter months as well. UVB radiation is  responsible for tanning, burning, inflammation, delayed erythema (pinkness), pigmentation (brown spots), and skin cancer.     I recommend self monthly full body exams and yearly full body exams with a dermatology provider. If you develop a new or changing lesion please follow up for examination. Most skin cancers are pink and scaly or pink and pearly. However, we do see blue/brown/black skin cancers.  Consider the ABCDEs of melanoma when giving yourself your monthly full body exam ( don't forget the groin, buttocks, feet, toes, etc). A-asymmetry, B-borders, C-color, D-diameter, E-elevation or evolving. If you see any of these changes please follow up in clinic. If you cannot see your back I recommend purchasing a hand held mirror to use with a larger wall mirror.

## 2020-11-11 NOTE — PROGRESS NOTES
HPI:  Sea Solis is a 40 year old male patient here today for scars on penis and chest with improvement with IL TAC.  Patient states this has been present for a while.  Patient reports the following symptoms: bothersome .  Patient reports the following previous treatments: IL TAC.  Patient reports the following modifying factors: none.  Associated symptoms: none.  Patient has no other skin complaints today.  Remainder of the HPI, Meds, PMH, Allergies, FH, and SH was reviewed in chart.      Past Medical History:   Diagnosis Date     Hyperlipidemia 3/21/2018       No past surgical history on file.     History reviewed. No pertinent family history.    Social History     Socioeconomic History     Marital status:      Spouse name: Not on file     Number of children: Not on file     Years of education: Not on file     Highest education level: Not on file   Occupational History     Not on file   Social Needs     Financial resource strain: Not on file     Food insecurity     Worry: Not on file     Inability: Not on file     Transportation needs     Medical: Not on file     Non-medical: Not on file   Tobacco Use     Smoking status: Former Smoker     Smokeless tobacco: Never Used   Substance and Sexual Activity     Alcohol use: Yes     Alcohol/week: 0.0 standard drinks     Comment: 2-3x week     Drug use: Yes     Types: Marijuana     Comment: Occasional     Sexual activity: Yes     Partners: Female   Lifestyle     Physical activity     Days per week: Not on file     Minutes per session: Not on file     Stress: Not on file   Relationships     Social connections     Talks on phone: Not on file     Gets together: Not on file     Attends Scientologist service: Not on file     Active member of club or organization: Not on file     Attends meetings of clubs or organizations: Not on file     Relationship status: Not on file     Intimate partner violence     Fear of current or ex partner: Not on file     Emotionally abused:  Not on file     Physically abused: Not on file     Forced sexual activity: Not on file   Other Topics Concern     Not on file   Social History Narrative     Not on file       Outpatient Encounter Medications as of 11/11/2020   Medication Sig Dispense Refill     atorvastatin (LIPITOR) 20 MG tablet Take 1 tablet (20 mg) by mouth daily For cholesterol. (Patient not taking: Reported on 5/7/2020) 90 tablet 3     escitalopram (LEXAPRO) 10 MG tablet Take 1 tablet (10 mg) by mouth daily (Patient not taking: Reported on 5/20/2020) 30 tablet 1     ibuprofen (ADVIL/MOTRIN) 800 MG tablet Take 1 tablet (800 mg) by mouth every 8 hours as needed for moderate pain (Patient not taking: Reported on 3/18/2019) 60 tablet 1     sildenafil (VIAGRA) 50 MG tablet Take 1 tablet (50 mg) by mouth daily as needed 30 min to 4 hrs before sex. Do not use with nitroglycerin, terazosin or doxazosin. (Patient not taking: Reported on 5/7/2020) 6 tablet 1     [DISCONTINUED] azithromycin (ZITHROMAX) 250 MG tablet Take 2 tablets on the 1st day and one tablet daily for the next 4 days (Patient not taking: Reported on 6/22/2020) 6 tablet 0     No facility-administered encounter medications on file as of 11/11/2020.        Review Of Systems:  Skin: keloids  Eyes: negative  Ears/Nose/Throat: negative  Respiratory: No shortness of breath, dyspnea on exertion, cough, or hemoptysis  Cardiovascular: negative  Gastrointestinal: negative  Genitourinary: negative  Musculoskeletal: negative  Neurologic: negative  Psychiatric: negative  Hematologic/Lymphatic/Immunologic: negative  Endocrine: negative      Objective:     /68   Pulse 69   SpO2 99%   Eyes: Conjunctivae/lids: Normal   ENT: Lips:  Normal  MSK: Normal  Cardiovascular: Peripheral edema none  Pulm: Breathing Normal  Neuro/Psych: Orientation: A/O x 3 Normal; Mood/Affect: Normal, NAD, WDWN  Pt accompanied by: self  Following areas examined: face ( wearing mask)  Villafana skin type:v    Findings:  Smooth brown papules on chest  Assessment and Plan:  1)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  Risks including but not limited to injection site reaction, bruising, no resolution.  All questions answered and entertained to patient s satisfaction.  Informed consent obtained.  IL TAC in concentration of 20mg/ml was injected ID to 2 lesions on penis and suprapubic area.  Total injected was  0.15 ml.  Patient tolerated without complications and given wound care instructions, including not to move product around.  Return in 4 weeks for follow-up and possible additional IL TAC. May need additional treatment. May not be effective.    Lot #:[id number]  Exp: 10/21  Sodium chloride  Lot #:[id number]  Exp: 01/dec/2021    IL TAC: PGACAC discussed.  Risks including but not limited to injection site reaction, bruising, no resolution.  All questions answered and entertained to patient s satisfaction.  Informed consent obtained.  IL TAC in concentration of 40mg/ml was injected ID to  Keloids chest x4.  Total injected was  0.15 ml.  Patient tolerated without complications and given wound care instructions, including not to move product around.  Return in 4 weeks for follow-up and possible additional IL TAC. May need additional treatment. May not be effective.    Lot #:[id number]  Exp: 10/21        Follow up in one month

## 2020-11-12 ENCOUNTER — OFFICE VISIT (OUTPATIENT)
Dept: FAMILY MEDICINE | Facility: CLINIC | Age: 40
End: 2020-11-12

## 2020-11-12 VITALS
OXYGEN SATURATION: 99 % | TEMPERATURE: 98 F | SYSTOLIC BLOOD PRESSURE: 119 MMHG | RESPIRATION RATE: 16 BRPM | DIASTOLIC BLOOD PRESSURE: 74 MMHG | HEART RATE: 74 BPM

## 2020-11-12 DIAGNOSIS — Z71.84 TRAVEL ADVICE ENCOUNTER: Primary | ICD-10-CM

## 2020-11-12 PROCEDURE — 90691 TYPHOID VACCINE IM: CPT | Mod: GA | Performed by: NURSE PRACTITIONER

## 2020-11-12 PROCEDURE — 90734 MENACWYD/MENACWYCRM VACC IM: CPT | Mod: GA | Performed by: NURSE PRACTITIONER

## 2020-11-12 PROCEDURE — 90632 HEPA VACCINE ADULT IM: CPT | Mod: GA | Performed by: NURSE PRACTITIONER

## 2020-11-12 PROCEDURE — 90471 IMMUNIZATION ADMIN: CPT | Performed by: NURSE PRACTITIONER

## 2020-11-12 PROCEDURE — 90686 IIV4 VACC NO PRSV 0.5 ML IM: CPT | Performed by: NURSE PRACTITIONER

## 2020-11-12 PROCEDURE — 90713 POLIOVIRUS IPV SC/IM: CPT | Mod: GA | Performed by: NURSE PRACTITIONER

## 2020-11-12 PROCEDURE — 90717 YELLOW FEVER VACCINE SUBQ: CPT | Mod: GA | Performed by: NURSE PRACTITIONER

## 2020-11-12 PROCEDURE — 99402 PREV MED CNSL INDIV APPRX 30: CPT | Mod: 25 | Performed by: NURSE PRACTITIONER

## 2020-11-12 PROCEDURE — 90472 IMMUNIZATION ADMIN EACH ADD: CPT | Mod: GA | Performed by: NURSE PRACTITIONER

## 2020-11-12 RX ORDER — ATOVAQUONE AND PROGUANIL HYDROCHLORIDE 250; 100 MG/1; MG/1
1 TABLET, FILM COATED ORAL DAILY
Qty: 70 TABLET | Refills: 0 | Status: SHIPPED | OUTPATIENT
Start: 2020-11-12 | End: 2022-04-07

## 2020-11-12 RX ORDER — AZITHROMYCIN 500 MG/1
500 TABLET, FILM COATED ORAL DAILY
Qty: 3 TABLET | Refills: 0 | Status: SHIPPED | OUTPATIENT
Start: 2020-11-12 | End: 2020-11-15

## 2020-11-12 NOTE — PROGRESS NOTES
Nurse Note      Itinerary:  [place]       Departure Date: 12/07/2020      Return Date: 01/27/2021      Length of Trip 1 month       Reason for Travel: Visiting friends and relatives           Urban or rural: both      Accommodations: Family home        IMMUNIZATION HISTORY  Have you received any immunizations within the past 4 weeks?  No  Have you ever fainted from having your blood drawn or from an injection?  No  Have you ever had a fever reaction to vaccination?  No  Have you ever had any bad reaction or side effect from any vaccination?  No  Have you ever had hepatitis A or B vaccine?  Yes  Do you live (or work closely) with anyone who has AIDS, an AIDS-like condition, any other immune disorder or who is on chemotherapy for cancer?  No  Do you have a family history of immunodeficiency?  No  Have you received any injection of immune globulin or any blood products during the past 12 months?  No    Patient roomed by BROOK Nayakselvin Solis is a 40 year old male seen today alone for counsultation for international travel.   Patient will be departing in  3.5 week(s) and  traveling alone.      Patient itinerary :  will be in [facility] for 1 week of Quarantine then to  the [facility] region in [facility] which risk for Malaria, Yellow Fever, food borne illnesses, Typhoid and covid. exposure.      Patient's activities will include visiting friends and relatives.    Patient's country [place] birth is [place] [facility] [place] [facility], Moved to  ([place]) 5 years ago    Special medical concerns: Hyperlipidemia ( not currently treating )   Pre-travel questionnaire was completed by patient and reviewed by provider.     Vitals: /74   Pulse 74   Temp 98  F (36.7  C) (Tympanic)   Resp 16   SpO2 99%   BMI= There is no height or weight on file to calculate BMI.    EXAM:  General:  Well-nourished, well-developed in no acute distress.  Appears to be stated age, interacts appropriately and expresses understanding of information given to  patient.    Current Outpatient Medications   Medication Sig Dispense Refill     atorvastatin (LIPITOR) 20 MG tablet Take 1 tablet (20 mg) by mouth daily For cholesterol. (Patient not taking: Reported on 5/7/2020) 90 tablet 3     escitalopram (LEXAPRO) 10 MG tablet Take 1 tablet (10 mg) by mouth daily (Patient not taking: Reported on 5/20/2020) 30 tablet 1     ibuprofen (ADVIL/MOTRIN) 800 MG tablet Take 1 tablet (800 mg) by mouth every 8 hours as needed for moderate pain (Patient not taking: Reported on 3/18/2019) 60 tablet 1     sildenafil (VIAGRA) 50 MG tablet Take 1 tablet (50 mg) by mouth daily as needed 30 min to 4 hrs before sex. Do not use with nitroglycerin, terazosin or doxazosin. (Patient not taking: Reported on 5/7/2020) 6 tablet 1     Patient Active Problem List   Diagnosis     Latent tuberculosis     Erectile dysfunction, unspecified erectile dysfunction type     Hyperlipidemia     Tension headache     Sleep disturbance     Vitamin D deficiency     No Known Allergies      Immunizations discussed include:   Hepatitis A:  Ordered/given today, risks, benefits and side effects reviewed  Hepatitis B: deferred for future visit  Influenza: Ordered/given today, risks, benefits and side effects reviewed  Typhoid: Ordered/given today, risks, benefits and side effects reviewed  Rabies: Declined  reviewed managment of a animal bite or scratch (washing wound, seek medical care within 24 hours for post exposure prophylaxis )  Yellow Fever: Stamaril Ordered/given today - consent completed, side effects, precautions, allergies, risks discussed. Patient expressed understanding.  Khmer Encephalitis: Not indicated  Meningococcus: Ordered/given today, risks, benefits and side effects reviewed  Tetanus/Diphtheria: Up to date  Measles/Mumps/Rubella: Up to date  Cholera: Not needed  Polio: Ordered/given today, risks, benefits and side effects reviewed  Pneumococcal: Up to date  Varicella: Up to date  Zostavax:  Not  indicated  Shingrix: Not indicated  HPV:  Not indicated  TB:  Deferred    Stamaril Informed Consent    The patient was provided with a copy of the IRB-approved consent form and all questions were answered before the patient agreed to participate by signing the informed consent document.   A copy of the form was provided to the patient.    Date: November 12, 2020   Consent Version Date: 5/10/2017   Consent Obtained by:  Holley Self CNP (Lori)     HIPAA:  Yes  HIPAA Authorization Signed Date: 5/10/2017       Inclusion/Exclusion Criteria:    (Similar to Yellow Fever-VAX)      The patient met all of the following inclusion criteria in order to be eligible for the Stamaril vaccination under this EAP (Expanded Access Investigational New Drug Program)           At increased risk for YF, including researchers, laboratory workers, vaccine production staff, and those who are traveling within 30 days to a YF-endemic region or to a country requiring proof of YF vaccination under IHRs (International Health Regulations)?       Yes     Patient is greater than or equal to 9 months of age on the day of vaccination?     Yes     Patient is greater than or equal to 18 years of age and signed and dated the Consent Forms?     Yes     Patient is < 18 years of age and parent(s)/guardian(s) signed and dated the Consent Forms?      Patient is 7 years to < 18 years of age and signed and dated the Assent form?        No Assent is required.  Patient is <7 years of age.     No      No      N/A     The patient did not meet any of the following criteria that would have excluded the patient from receiving the Stamaril vaccination under this EAP              Patient is less than 9 months of age.       No     The patient is breast-feeding and cannot stop nursing for at least 14 days after vaccination.    Note: Yellow Fever vaccine virus may be transmissible via breast milk by nursing mothers who are vaccinated during the final 2 weeks of pregnancy  or post-partum.   Following transmission, infants may develop encephalitis.  The minimum time of discontinuation of breastfeeding for 14 days after vaccination is based on the expected clearance of live-attenuated vaccine virus.       No     The patient is immunosuppressed, whether congenital or idiopathic, including for example, leukemia, lymphoma, other malignancies, and patients who are receiving immunosuppressant medications (e.g. Systemic corticosteroids [greater than the standard dose of topical or inhaled steroids], alkylating drugs, antimetabolites, of other cytotoxic or immunomodulatory drugs) or radiation therapy or organ transplantation.       No     The patient has known hypersensitivity to the active substance or to any of the excipients of Stamaril vaccine or to eggs or chicken proteins.     No     The patient is symptomatic for human immunodeficiency virus (HIV) infection     No     The patient is asymptomatic for HIV infection but accompanied by evidence of severe immune suppression    Note:  Evidence of severe immune suppression includes CD4+ T-cell counts < 200 cubic millimeters (or < 15% total lymphocytes in children aged < 6 years), or as determined by the health care provider.       No     The patient has a history of thymus dysfunction (including myasthenia gravis, thymoma, thymectomy)     No     Moderate or severe febrile illness or acute illness    Note: Participation in the EAP can be reassessed when moderate or severe febrile illness or acute illness has resolved.       No             Altitude Exposure on this trip: np  Past tolerance to Altitude: na    ASSESSMENT/PLAN:    ICD-10-CM    1. Travel advice encounter  Z71.84 atovaquone-proguanil (MALARONE) 250-100 MG tablet     azithromycin (ZITHROMAX) 500 MG tablet     I have reviewed general recommendations for safe travel   including: food/water precautions, insect precautions,roadway safety. Educational materials and [employer] report  provided.    Malaraia prophylaxis recommended: Malarone  Symptomatic treatment for traveler's diarrhea: azithromycin  Altitude illness prevention and treatment: none      Evacuation insurance advised and resources were provided to patient.    Total visit time 30 minutes  with over 50% of time spent counseling patient as detailed above.    I recommend a physical with his primary doctor for follow up re Hyper lipidemia.    He reports a change in his diet.     Holley Self CNP

## 2020-11-12 NOTE — NURSING NOTE
Prior to immunization administration, verified patients identity using patient s name and date of birth. Please see Immunization Activity for additional information.     Screening Questionnaire for Adult Immunization    Are you sick today?   No   Do you have allergies to medications, food, a vaccine component or latex?   No   Have you ever had a serious reaction after receiving a vaccination?   No   Do you have a long-term health problem with heart disease, lung disease, asthma, kidney disease, metabolic disease (e.g. diabetes), anemia, or other blood disorder?   No   Do you have cancer, leukemia, HIV/AIDS, or any other immune system problem?   No   In the past 3 months, have you taken medications that affect  your immune system, such as prednisone, other steroids, or anticancer drugs; drugs for the treatment of rheumatoid arthritis, Crohn s disease, or psoriasis; or have you had radiation treatments?   No   Have you had a seizure, or a brain or other nervous system problem?   No   During the past year, have you received a transfusion of blood or blood     products, or been given immune (gamma) globulin or antiviral drug?   No   For women: Are you pregnant or is there a chance you could become        pregnant during the next month?   No   Have you received any vaccinations in the past 4 weeks?   No     Immunization questionnaire answers were all negative.        Per orders of Kathryn Self NP, injection of YF, Typhoid, Flu, HEp A, IPV and Menactra given by Malini Huertas CMA. Patient instructed to remain in clinic for 15 minutes afterwards, and to report any adverse reaction to me immediately.       Screening performed by Malini Huertas CMA on 11/12/2020 at 4:09 PM.

## 2020-11-12 NOTE — PATIENT INSTRUCTIONS
Today November 12, 2020 you received the    Flu Vaccine    Hepatitis A Vaccine - Please return on 5/11/21 or later for your 2nd and final dose.    Yellow Fever (YF)    Polio (IPV) Vaccine    Meningococcal (Menactra) Vaccine    Typhoid - injectable. This vaccine is valid for two years.   .    These appointments can be made as a NURSE ONLY visit.    **It is very important for the vaccinations to be given on the scheduled day(s), this helps ensure you receive the full effectiveness of the vaccine.**    Please call [facility] with any questions [phone]    Thank you for visiting [facility]

## 2020-11-27 ENCOUNTER — OFFICE VISIT (OUTPATIENT)
Dept: FAMILY MEDICINE | Facility: CLINIC | Age: 40
End: 2020-11-27

## 2020-11-27 VITALS
OXYGEN SATURATION: 97 % | HEIGHT: 66 IN | BODY MASS INDEX: 27 KG/M2 | WEIGHT: 168 LBS | TEMPERATURE: 98.5 F | RESPIRATION RATE: 17 BRPM | DIASTOLIC BLOOD PRESSURE: 64 MMHG | HEART RATE: 64 BPM | SYSTOLIC BLOOD PRESSURE: 116 MMHG

## 2020-11-27 DIAGNOSIS — Z00.00 ROUTINE GENERAL MEDICAL EXAMINATION AT A HEALTH CARE FACILITY: Primary | ICD-10-CM

## 2020-11-27 DIAGNOSIS — E78.5 HYPERLIPIDEMIA, UNSPECIFIED HYPERLIPIDEMIA TYPE: ICD-10-CM

## 2020-11-27 DIAGNOSIS — R07.0 THROAT DISCOMFORT: ICD-10-CM

## 2020-11-27 DIAGNOSIS — Z11.3 SCREEN FOR STD (SEXUALLY TRANSMITTED DISEASE): ICD-10-CM

## 2020-11-27 LAB
CHOLEST SERPL-MCNC: 301 MG/DL
GLUCOSE SERPL-MCNC: 98 MG/DL (ref 70–99)
HDLC SERPL-MCNC: 63 MG/DL
LDLC SERPL CALC-MCNC: 212 MG/DL
NONHDLC SERPL-MCNC: 238 MG/DL
TRIGL SERPL-MCNC: 132 MG/DL

## 2020-11-27 PROCEDURE — 99396 PREV VISIT EST AGE 40-64: CPT | Performed by: INTERNAL MEDICINE

## 2020-11-27 PROCEDURE — 82947 ASSAY GLUCOSE BLOOD QUANT: CPT | Performed by: INTERNAL MEDICINE

## 2020-11-27 PROCEDURE — 99000 SPECIMEN HANDLING OFFICE-LAB: CPT | Performed by: INTERNAL MEDICINE

## 2020-11-27 PROCEDURE — 87389 HIV-1 AG W/HIV-1&-2 AB AG IA: CPT | Performed by: INTERNAL MEDICINE

## 2020-11-27 PROCEDURE — 80061 LIPID PANEL: CPT | Performed by: INTERNAL MEDICINE

## 2020-11-27 PROCEDURE — 86780 TREPONEMA PALLIDUM: CPT | Mod: 90 | Performed by: INTERNAL MEDICINE

## 2020-11-27 PROCEDURE — 99213 OFFICE O/P EST LOW 20 MIN: CPT | Mod: 25 | Performed by: INTERNAL MEDICINE

## 2020-11-27 PROCEDURE — 36415 COLL VENOUS BLD VENIPUNCTURE: CPT | Performed by: INTERNAL MEDICINE

## 2020-11-27 PROCEDURE — 86803 HEPATITIS C AB TEST: CPT | Performed by: INTERNAL MEDICINE

## 2020-11-27 RX ORDER — ATORVASTATIN CALCIUM 20 MG/1
20 TABLET, FILM COATED ORAL DAILY
Qty: 90 TABLET | Refills: 3 | Status: SHIPPED | OUTPATIENT
Start: 2020-11-27 | End: 2020-11-30

## 2020-11-27 RX ORDER — LORATADINE 10 MG/1
10 TABLET ORAL DAILY
Qty: 30 TABLET | Refills: 1 | Status: SHIPPED | OUTPATIENT
Start: 2020-11-27 | End: 2023-04-11

## 2020-11-27 ASSESSMENT — PAIN SCALES - GENERAL: PAINLEVEL: NO PAIN (0)

## 2020-11-27 ASSESSMENT — MIFFLIN-ST. JEOR: SCORE: 1614.79

## 2020-11-27 NOTE — PROGRESS NOTES
3  SUBJECTIVE:   CC: Sea Solis is an 40 year old male who presents for preventive health visit.       Patient has been advised of split billing requirements and indicates understanding: Yes     He is here for physical today but also wants to discuss about a few other things  Wants to get tested for STD  He also has got a lot of mucus in the mornings and wants to do something about it  Also he wants to talk about the hyperlipidemia and cancer screening  Healthy Habits:  Do you get at least three servings of calcium containing foods daily (dairy, green leafy vegetables, etc.)? yes  Amount of exercise or daily activities, outside of work: 3-4 day(s) per week  Problems taking medications regularly not taking prescribed medications  Medication side effects: n/a  Have you had an eye exam in the past two years? no  Do you see a dentist twice per year? no  Do you have sleep apnea, excessive snoring or daytime drowsiness?no          Today's PHQ-2 Score:   PHQ-2 ( 1999 Pfizer) 11/27/2020 5/7/2020   Q1: Little interest or pleasure in doing things 0 3   Q2: Feeling down, depressed or hopeless 0 3   PHQ-2 Score 0 6       Abuse: Current or Past(Physical, Sexual or Emotional)- No  Do you feel safe in your environment? Yes        Social History     Tobacco Use     Smoking status: Former Smoker     Smokeless tobacco: Never Used   Substance Use Topics     Alcohol use: Yes     Alcohol/week: 0.0 standard drinks     Comment: 2-3x week     If you drink alcohol do you typically have >3 drinks per day or >7 drinks per week? No                      Last PSA:   PSA   Date Value Ref Range Status   03/19/2018 0.44 0 - 4 ug/L Final     Comment:     Assay Method:  Chemiluminescence using Siemens Vista analyzer       Reviewed orders with patient. Reviewed health maintenance and updated orders accordingly - Yes  Lab work is in process    Reviewed and updated as needed this visit by clinical staff  Tobacco  Allergies  Meds   Med Hx   "Surg Hx  Fam Hx  Soc Hx        Reviewed and updated as needed this visit by Provider                Past Medical History:   Diagnosis Date     Hyperlipidemia 3/21/2018        ROS:  CONSTITUTIONAL: NEGATIVE for fever, chills, change in weight  INTEGUMENTARY/SKIN: NEGATIVE for worrisome rashes, moles or lesions  EYES: NEGATIVE for vision changes or irritation  ENT: NEGATIVE for ear, mouth and throat problems  RESP: NEGATIVE for significant cough or SOB  CV: NEGATIVE for chest pain, palpitations or peripheral edema  GI: NEGATIVE for nausea, abdominal pain, heartburn, or change in bowel habits   male: negative for dysuria, hematuria, decreased urinary stream, erectile dysfunction, urethral discharge  MUSCULOSKELETAL: NEGATIVE for significant arthralgias or myalgia  NEURO: NEGATIVE for weakness, dizziness or paresthesias  PSYCHIATRIC: NEGATIVE for changes in mood or affect    OBJECTIVE:   /64 (BP Location: Right arm, Patient Position: Chair, Cuff Size: Adult Large)   Pulse 64   Temp 98.5  F (36.9  C) (Oral)   Resp 17   Ht 1.676 m (5' 6\")   Wt 76.2 kg (168 lb)   SpO2 97%   BMI 27.12 kg/m    EXAM:  GENERAL: healthy, alert and no distress  EYES: Eyes grossly normal to inspection, PERRL and conjunctivae and sclerae normal  HENT: ear canals and TM's normal, nose and mouth without ulcers or lesions  NECK: no adenopathy, no asymmetry, masses, or scars and thyroid normal to palpation  RESP: lungs clear to auscultation - no rales, rhonchi or wheezes  CV: regular rate and rhythm, normal S1 S2, no S3 or S4, no murmur, click or rub, no peripheral edema and peripheral pulses strong  ABDOMEN: soft, nontender, no hepatosplenomegaly, no masses and bowel sounds normal  MS: no gross musculoskeletal defects noted, no edema  SKIN: no suspicious lesions or rashes  NEURO: Normal strength and tone, mentation intact and speech normal  PSYCH: mentation appears normal, affect normal/bright    No results found for this or any " "previous visit (from the past 24 hour(s)).    ASSESSMENT/PLAN:   1. Routine general medical examination at a health care facility  Discussed about age-appropriate cancer screening  He does not have any LUTS symptoms  He has no family start colon cancer  Can start PSA checking and colon cancer screening at 45  - GLUCOSE    2. Hyperlipidemia, unspecified hyperlipidemia type  He is not taking his Lipitor  He thought it was just for a few days  I told him that this has to be lifelong  Discussed about healthy diet and exercise  He mostly probably has familial hyperlipidemia  He tries to eat healthy and exercises  - atorvastatin (LIPITOR) 20 MG tablet; Take 1 tablet (20 mg) by mouth daily For cholesterol.  Dispense: 90 tablet; Refill: 3  - Lipid panel reflex to direct LDL Non-fasting    #3.  Screen for sexually transmitted diseases:  Check hep C  HIV  Syphilis    #4.  Excessive mucus in throat:  Occurs early in the morning  We will try a course of loratadine    Patient has been advised of split billing requirements and indicates understanding: Yes  COUNSELING:  Reviewed preventive health counseling, as reflected in patient instructions       Regular exercise       Healthy diet/nutrition       Vision screening       Immunizations  Declined: Influenza due to he had it on 11/12/2020          Estimated body mass index is 27.12 kg/m  as calculated from the following:    Height as of this encounter: 1.676 m (5' 6\").    Weight as of this encounter: 76.2 kg (168 lb).    Weight management plan: Discussed healthy diet and exercise guidelines    He reports that he has quit smoking. He has never used smokeless tobacco.      Counseling Resources:  ATP IV Guidelines  Pooled Cohorts Equation Calculator  FRAX Risk Assessment  ICSI Preventive Guidelines  Dietary Guidelines for Americans, 2010  USDA's MyPlate  ASA Prophylaxis  Lung CA Screening    Malik Olivo MD  [facility]  "

## 2020-11-29 LAB — HIV 1+2 AB+HIV1 P24 AG SERPL QL IA: NONREACTIVE

## 2020-11-30 ENCOUNTER — MYC MEDICAL ADVICE (OUTPATIENT)
Dept: FAMILY MEDICINE | Facility: CLINIC | Age: 40
End: 2020-11-30

## 2020-11-30 LAB
HCV AB SERPL QL IA: NONREACTIVE
T PALLIDUM AB SER QL: NONREACTIVE

## 2020-11-30 RX ORDER — ATORVASTATIN CALCIUM 40 MG/1
40 TABLET, FILM COATED ORAL DAILY
Qty: 90 TABLET | Refills: 0 | COMMUNITY
Start: 2020-11-30 | End: 2022-02-03

## 2021-10-09 ENCOUNTER — HEALTH MAINTENANCE LETTER (OUTPATIENT)
Age: 41
End: 2021-10-09

## 2022-01-01 ENCOUNTER — APPOINTMENT (OUTPATIENT)
Dept: URGENT CARE | Facility: CLINIC | Age: 42
End: 2022-01-01
Payer: COMMERCIAL

## 2022-01-21 ENCOUNTER — OFFICE VISIT (OUTPATIENT)
Dept: NEUROLOGY | Facility: CLINIC | Age: 42
End: 2022-01-21
Payer: COMMERCIAL

## 2022-01-21 VITALS
SYSTOLIC BLOOD PRESSURE: 129 MMHG | BODY MASS INDEX: 26.36 KG/M2 | WEIGHT: 164 LBS | HEIGHT: 66 IN | HEART RATE: 72 BPM | DIASTOLIC BLOOD PRESSURE: 77 MMHG

## 2022-01-21 DIAGNOSIS — R41.89 SUBJECTIVE MEMORY COMPLAINTS: Primary | ICD-10-CM

## 2022-01-21 PROCEDURE — 99205 OFFICE O/P NEW HI 60 MIN: CPT | Performed by: PSYCHIATRY & NEUROLOGY

## 2022-01-21 ASSESSMENT — MONTREAL COGNITIVE ASSESSMENT (MOCA)
10. [FLUENCY] NAME WORDS STARTING WITH DESIGNATED LETTER: 0
4. NAME EACH OF THE THREE ANIMALS SHOWN: 1
11. FOR EACH PAIR OF WORDS, WHAT CATEGORY DO THEY BELONG TO (OUT OF 2): 0
9. REPEAT EACH SENTENCE: 1
VISUOSPATIAL/EXECUTIVE SUBSCORE: 1
8. SERIAL SUBTRACTION OF 7S: 0
WHAT IS THE TOTAL SCORE (OUT OF 30): 8
12. MEMORY INDEX SCORE: 0
WHAT LEVEL OF EDUCATION WAS ATTAINED: 0
6. READ LIST OF DIGITS [FORWARD/BACKWARD]: 1
13. ORIENTATION SUBSCORE: 4
7. [VIGILENCE] TAP WHEN HEARING DESIGNATED LETTER: 0

## 2022-01-21 ASSESSMENT — MIFFLIN-ST. JEOR: SCORE: 1591.65

## 2022-01-21 NOTE — PROGRESS NOTES
NEUROLOGY OUTPATIENT CONSULT NOTE   Jan 21, 2022     CHIEF COMPLAINT/REASON FOR VISIT/REASON FOR CONSULT  Patient presents with:  New Patient: memory loss    REASON FOR CONSULTATION- Memory loss    REFERRAL SOURCE  DrArtur Referred Self  CC  Referred Self    HISTORY OF PRESENT ILLNESS  Sea Solis is a 41 year old male seen today for evaluation of memory problems.  He reports that the memory problems have been going on for the last 4 years.  He remembers that [place] to [place] in [place].  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  He is originally from [place].  He went to school there.  He remembers doing good in school but does not remember much about his early life in [place].  Memory problems involved both short-term and long-term.  Does have word finding difficulty.  Also has difficulty staying focused on things.  He will forget conversations that he has had with other people.  He will watch a movie with his wife and not remember what he watched.  He is currently driving for living.  Does report that occasionally he will miss the exit and take the wrong exit.  He will go for walks and then forget how to get back home.  Has to use GPS if he is using a bicycle.  Cannot find the bathroom at home.  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  Denies any changes in his personality.  Feels occasionally that there is a present and hears some noises.  No visual hallucinations.  No auditory hallucinations or people to talk to him.  Will wake up multiple times in the middle of the night.  Reports no drug use good does smoke weed occasionally.  No new medications.  No previous history of mental illness.    Previous history is reviewed and this is unchanged.    PAST MEDICAL/SURGICAL HISTORY  Past Medical History:   Diagnosis Date     Hyperlipidemia 3/21/2018     Patient Active Problem List   Diagnosis     Latent tuberculosis     Erectile dysfunction, unspecified erectile dysfunction type      "Hyperlipidemia     Tension headache     Sleep disturbance     Vitamin D deficiency   Significant memory loss, nervous breakdown, Depression, sleep disorder, high cholesterol    FAMILY HISTORY  No family history on file.   Positive for multiple family members including father, uncles and cousins with memory issues.  There is also family history of epilepsy and cousins.  There is family history of migraines and cousins.  Grandparents do have some kind of tremor.    SOCIAL HISTORY  Social History     Tobacco Use     Smoking status: Former Smoker     Smokeless tobacco: Never Used   Substance Use Topics     Alcohol use: Yes     Alcohol/week: 0.0 standard drinks     Comment: 2-3x week     Drug use: Yes     Types: Marijuana     Comment: rarely       SYSTEMS REVIEW  Twelve-system ROS was done and other than the HPI this was negative except for neck pain, back pain, dizziness, hearing loss, sleepiness during the day, sleeping problems, headaches, memory loss, anxiety, depression.    MEDICATIONS  atorvastatin (LIPITOR) 40 MG tablet, Take 1 tablet (40 mg) by mouth daily  loratadine (CLARITIN) 10 MG tablet, Take 1 tablet (10 mg) by mouth daily  atovaquone-proguanil (MALARONE) 250-100 MG tablet, Take 1 tablet by mouth daily Start 2 days before exposure to Malaria and continue daily till  7 days after exposure. (Patient not taking: Reported on 11/27/2020)    No current facility-administered medications on file prior to visit.       PHYSICAL EXAMINATION  VITALS: /77 (BP Location: Right arm, Patient Position: Sitting)   Pulse 72   Ht 1.676 m (5' 6\")   Wt 74.4 kg (164 lb)   BMI 26.47 kg/m    GENERAL: Healthy appearing, alert, no acute distress, normal habitus.  CARDIOVASCULAR: Extremities warm and well perfused. Pulses present.   EYES: Funduscopic exam limited.  NEUROLOGICAL:  Patient is awake and oriented to self, place and time.  Attention span is normal.  Memory is limited; MoCA 8.  Language is fluent and some " difficulty follows commands appropriately.  Appropriate fund of knowledge. Cranial nerves 2-12 are intact. There is no pronator drift.  Motor exam shows 5/5 strength in all extremities.  Tone is symmetric bilaterally in upper and lower extremities.  Reflexes are symmetric and 2+ in upper extremities and lower extremities. Sensory exam is grossly intact to light touch, pin prick and vibration.  Finger to nose and heel to shin is without dysmetria.  Romberg is negative.  Gait is normal and the patient is able to do tandem walk and walk on toes and heels.    DIAGNOSTICS  OUTSIDE RECORDS  Outside referral notes and chart notes were reviewed and pertinent information has been summarized (in addition to the HPI):-Patient having some memory issues.  There are some notes describing some lesions in the genital area.  He has had testing for STD done.  No relevant notes available.  Patient has possibly had some testing for syphilis in the past.    IMPRESSION/REPORT/PLAN  Subjective memory complaints    This is a 41 year old male with progressive cognitive difficulty over the last 4 years.  Patient has multiple family members with cognitive issues also has some family members with epilepsy.  He is young to have dementia.  Would like to be aggressive to look for causes of cognitive decline.  He did score poorly on the Rice today.  We will start with a MRI of the brain, EEG, blood work to look for causes.  Could consider lumbar puncture for autoimmune encephalopathy depending on the results of this testing.  We will further set him up with neuropsychology evaluation to see if this is more of a cognitive issue or more for possible psychiatric issues/attention deficit disorder.  I can see him back in about 6 weeks.    -     Vitamin B12; Future  -     Vitamin B1 whole blood; Future  -     TSH with free T4 reflex; Future  -     Treponema Abs w Reflex to RPR and Titer; Future  -     Methylmalonic Acid; Future  -     Comprehensive  metabolic panel; Future  -     CBC with Platelets & Differential; Future  -     Angiotensin converting enzyme; Future  -     Anti Nuclear Yarely IgG by IFA with Reflex; Future  -     Paraneoplastic antibody; Future  -     Erythrocyte sedimentation rate auto; Future  -     MR Brain w/o & w Contrast; Future  -     EEG Routine; Future  -     Thyroid peroxidase antibody; Future  -     Ammonia; Future  -     ANCA IgG by IFA with Reflex to Titer; Future  -     Neuropsychology Referral; Future    Return in about 6 weeks (around 3/4/2022) for In-Clinic Visit, After testing.    Over 64 minutes were spent coordinating the care for the patient on the day of the encounter.  This includes previsit, during visit and post visit activities as documented above.  Counseling patient.  Reviewing chart.  Multiple test ordered.  (Activities include but not inclusive of reviewing chart, reviewing outside records, reviewing labs and imaging study results as well as the images, patient visit time including getting history and exam,  use if applicable, review of test results with the patient and coming up with a plan in a shared model, counseling patient and family, education and answering patient questions, EMR , EMR diagnosis entry and problem list management, medication reconciliation and prescription management if applicable, paperwork if applicable, printing documents and documentation of the visit activities.)  Billing:-4 data points, 4 problem points, 4 risk points.      Andrey Burton MD  Neurologist  [facility]  Tel:- [phone]    This note was dictated using voice recognition software.  Any grammatical or context distortions are unintentional and inherent to the software.

## 2022-01-21 NOTE — NURSING NOTE
Chief Complaint   Patient presents with     New Patient     memory loss     Alonzo Lester CMA@ on 1/21/2022 at 10:24 AM

## 2022-01-21 NOTE — LETTER
1/21/2022         RE: Sea Solis  [address] [facility] [address]        Dear Colleague,    Thank you for referring your patient, Sea Solis, to the [facility] [place]. Please see a copy of my visit note below.    NEUROLOGY OUTPATIENT CONSULT NOTE   Jan 21, 2022     CHIEF COMPLAINT/REASON FOR VISIT/REASON FOR CONSULT  Patient presents with:  New Patient: memory loss    REASON FOR CONSULTATION- Memory loss    REFERRAL SOURCE  DrArtur Referred Self  CC  Referred Self    HISTORY OF PRESENT ILLNESS  Sea Solis is a 41 year old male seen today for evaluation of memory problems.  He reports that the memory problems have been going on for the last 4 years.  [place]  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  He is originally from [place].  He went to school there.  He remembers doing good in school but does not remember much about his early life in [place].  Memory problems involved both short-term and long-term.  Does have word finding difficulty.  Also has difficulty staying focused on things.  He will forget conversations that he has had with other people.  He will watch a movie with his wife and not remember what he watched.  He is currently driving for living.  Does report that occasionally he will miss the exit and take the wrong exit.  He will go for walks and then forget how to get back home.  Has to use GPS if he is using a bicycle.  Cannot find the bathroom at home.  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  Denies any changes in his personality.  Feels occasionally that there is a present and hears some noises.  No visual hallucinations.  No auditory hallucinations or people to talk to him.  Will wake up multiple times in the middle of the night.  Reports no drug use good does smoke weed occasionally.  No new medications.  No previous history of mental illness.    Previous history is reviewed and  "this is unchanged.    PAST MEDICAL/SURGICAL HISTORY  Past Medical History:   Diagnosis Date     Hyperlipidemia 3/21/2018     Patient Active Problem List   Diagnosis     Latent tuberculosis     Erectile dysfunction, unspecified erectile dysfunction type     Hyperlipidemia     Tension headache     Sleep disturbance     Vitamin D deficiency   Significant memory loss, nervous breakdown, Depression, sleep disorder, high cholesterol    FAMILY HISTORY  No family history on file.   Positive for multiple family members including father, uncles and cousins with memory issues.  There is also family history of epilepsy and cousins.  There is family history of migraines and cousins.  Grandparents do have some kind of tremor.    SOCIAL HISTORY  Social History     Tobacco Use     Smoking status: Former Smoker     Smokeless tobacco: Never Used   Substance Use Topics     Alcohol use: Yes     Alcohol/week: 0.0 standard drinks     Comment: 2-3x week     Drug use: Yes     Types: Marijuana     Comment: rarely       SYSTEMS REVIEW  Twelve-system ROS was done and other than the HPI this was negative except for neck pain, back pain, dizziness, hearing loss, sleepiness during the day, sleeping problems, headaches, memory loss, anxiety, depression.    MEDICATIONS  atorvastatin (LIPITOR) 40 MG tablet, Take 1 tablet (40 mg) by mouth daily  loratadine (CLARITIN) 10 MG tablet, Take 1 tablet (10 mg) by mouth daily  atovaquone-proguanil (MALARONE) 250-100 MG tablet, Take 1 tablet by mouth daily Start 2 days before exposure to Malaria and continue daily till  7 days after exposure. (Patient not taking: Reported on 11/27/2020)    No current facility-administered medications on file prior to visit.       PHYSICAL EXAMINATION  VITALS: /77 (BP Location: Right arm, Patient Position: Sitting)   Pulse 72   Ht 1.676 m (5' 6\")   Wt 74.4 kg (164 lb)   BMI 26.47 kg/m    GENERAL: Healthy appearing, alert, no acute distress, normal " habitus.  CARDIOVASCULAR: Extremities warm and well perfused. Pulses present.   EYES: Funduscopic exam limited.  NEUROLOGICAL:  Patient is awake and oriented to self, place and time.  Attention span is normal.  Memory is limited; MoCA 8.  Language is fluent and some difficulty follows commands appropriately.  Appropriate fund of knowledge. Cranial nerves 2-12 are intact. There is no pronator drift.  Motor exam shows 5/5 strength in all extremities.  Tone is symmetric bilaterally in upper and lower extremities.  Reflexes are symmetric and 2+ in upper extremities and lower extremities. Sensory exam is grossly intact to light touch, pin prick and vibration.  Finger to nose and heel to shin is without dysmetria.  Romberg is negative.  Gait is normal and the patient is able to do tandem walk and walk on toes and heels.    DIAGNOSTICS  OUTSIDE RECORDS  Outside referral notes and chart notes were reviewed and pertinent information has been summarized (in addition to the HPI):-Patient having some memory issues.  There are some notes describing some lesions in the genital area.  He has had testing for STD done.  No relevant notes available.  Patient has possibly had some testing for syphilis in the past.    IMPRESSION/REPORT/PLAN  Subjective memory complaints    This is a 41 year old male with progressive cognitive difficulty over the last 4 years.  Patient has multiple family members with cognitive issues also has some family members with epilepsy.  He is young to have dementia.  Would like to be aggressive to look for causes of cognitive decline.  He did score poorly on the Hart today.  We will start with a MRI of the brain, EEG, blood work to look for causes.  Could consider lumbar puncture for autoimmune encephalopathy depending on the results of this testing.  We will further set him up with neuropsychology evaluation to see if this is more of a cognitive issue or more for possible psychiatric issues/attention deficit  disorder.  I can see him back in about 6 weeks.    -     Vitamin B12; Future  -     Vitamin B1 whole blood; Future  -     TSH with free T4 reflex; Future  -     Treponema Abs w Reflex to RPR and Titer; Future  -     Methylmalonic Acid; Future  -     Comprehensive metabolic panel; Future  -     CBC with Platelets & Differential; Future  -     Angiotensin converting enzyme; Future  -     Anti Nuclear Yarely IgG by IFA with Reflex; Future  -     Paraneoplastic antibody; Future  -     Erythrocyte sedimentation rate auto; Future  -     MR Brain w/o & w Contrast; Future  -     EEG Routine; Future  -     Thyroid peroxidase antibody; Future  -     Ammonia; Future  -     ANCA IgG by IFA with Reflex to Titer; Future  -     Neuropsychology Referral; Future    Return in about 6 weeks (around 3/4/2022) for In-Clinic Visit, After testing.    Over 64 minutes were spent coordinating the care for the patient on the day of the encounter.  This includes previsit, during visit and post visit activities as documented above.  Counseling patient.  Reviewing chart.  Multiple test ordered.  (Activities include but not inclusive of reviewing chart, reviewing outside records, reviewing labs and imaging study results as well as the images, patient visit time including getting history and exam,  use if applicable, review of test results with the patient and coming up with a plan in a shared model, counseling patient and family, education and answering patient questions, EMR , EMR diagnosis entry and problem list management, medication reconciliation and prescription management if applicable, paperwork if applicable, printing documents and documentation of the visit activities.)  Billing:-4 data points, 4 problem points, 4 risk points.      Andrey Burton MD  Neurologist  [facility]  Tel:- [phone]    This note was dictated using voice recognition software.  Any grammatical or context  distortions are unintentional and inherent to the software.        Again, thank you for allowing me to participate in the care of your patient.        Sincerely,        Andrey Burton MD

## 2022-01-21 NOTE — NURSING NOTE
QI COGNITIVE ASSESSMENT (MOCA)  Version 7.1 Original Version  VISUOSPATIAL/EXECUTIVE               COPY CUBE      [ 0   ]                                [ 0   ] DRAW CLOCK (Ten past eleven)  (3 points)    [ 1   ]                    [  0  ]               [ 0   ]       Contour            Numbers     Hands POINTS                 1  / 5   NAMING    [ 1  ]                                                                        [ 0   ]                                             [ 0   ]  Litres Delgado                                Camel                     1/ 3   MEMORY Read list of words, subject must repeat them. Do 2 trials, even if 1st trial is successful. Do a recall after 5 minutes  FACE VELVET Rastafari PARDEEP RED No Points    1st          2nd         ATTENTION Read list of digits (1 digit/sec) Subject has to repeat in the forward order       [   1 ]   2  1  8  5  4                                [ 0   ] 7 4 2                         1 /2   Read list of letters. The subject must tap with his hand at each letter A. No points if > 2 errors.  [  0  ] F B A C M N A A J K L B A F A K D E A A A J A M O F A A B             0 /1   Serial 7 subtraction starting at 100          [ 0   ] 93         [  0  ] 86          [  0  ] 79          [  0  ] 72         [  0  ] 65   4 or 5 correct subtractions: 3 points,  2 or 3 correct: 2 points,  1correct: 1 point,   0 correct: 0 points          0  /3   LANGUAGE Repeat: I only know that Giancarlo is the one to help today. [ 1    ]                                      The cat always hid under the couch when dogs were in the room. [ 0  ]              1 /2   Fluency: Name maximum number of words in one minute that begin with the letter F                                                                                                                    [ 0   ] _5__ (N > 11 words)              0 /1   ABSTRACTION Similarity  between e.g. banana-orange=fruit                                                                   [  0  ] train-bicycle                      [  0  ] watch-ruler             0 /2   DELAYED  RECALL Has to recall words  WITH NO CUE FACE  [  0  ] VELVET  [0    ] Orthodoxy  [ 0   ]  PARDEEP  [  0  ] RED  [ 0   ] Points for UNCUED recall only           0 /5           OPTIONAL Category cue           Multiple choice cue          ORIENTATION  [  0  ] Date     [ 1   ] Month       [ 1   ] Year      [ 1   ] Day      [ 1   ] Place        [  0  ] City         4 /6   TOTAL  Normal > 26/30 0Add 1 point if < 12 years education       8 /30

## 2022-01-29 ENCOUNTER — HEALTH MAINTENANCE LETTER (OUTPATIENT)
Age: 42
End: 2022-01-29

## 2022-02-03 ENCOUNTER — OFFICE VISIT (OUTPATIENT)
Dept: FAMILY MEDICINE | Facility: CLINIC | Age: 42
End: 2022-02-03
Payer: COMMERCIAL

## 2022-02-03 VITALS — DIASTOLIC BLOOD PRESSURE: 78 MMHG | SYSTOLIC BLOOD PRESSURE: 114 MMHG

## 2022-02-03 DIAGNOSIS — L91.0 KELOID SCAR: Primary | ICD-10-CM

## 2022-02-03 PROCEDURE — 99207 PR DROP WITH A PROCEDURE: CPT | Performed by: PHYSICIAN ASSISTANT

## 2022-02-03 PROCEDURE — 11900 INJECT SKIN LESIONS </W 7: CPT | Performed by: PHYSICIAN ASSISTANT

## 2022-02-03 RX ORDER — TRIAMCINOLONE ACETONIDE 40 MG/ML
40 INJECTION, SUSPENSION INTRA-ARTICULAR; INTRAMUSCULAR ONCE
Status: COMPLETED | OUTPATIENT
Start: 2022-02-03 | End: 2022-02-03

## 2022-02-03 RX ADMIN — TRIAMCINOLONE ACETONIDE 40 MG: 40 INJECTION, SUSPENSION INTRA-ARTICULAR; INTRAMUSCULAR at 10:35

## 2022-02-03 NOTE — PROGRESS NOTES
[facility]  Encounter Date: Feb 3, 2022  Office Visit   ____________________________________________    Assessment & Plan:    1. Keloids, mid chest x 3.   - IL TAC: PGACAC discussed.  Risks including but not limited to injection site reaction, bruising, no resolution.  All questions answered and entertained to patient s satisfaction.  Informed consent obtained.  IL TAC in concentration of 40 mg/ml was injected ID to 3 keloids.  Total injected was 6 ml.  Patient tolerated without complications and given wound care instructions, including not to move product around.  Return in 4 weeks for follow-up and possible additional IL TAC. May need additional treatment. May not be effective.    Lot #:[id number]  Exp: 09.2022        Follow-up: 1 month(s) in-person, or earlier for new or changing lesions    Labs and office visit notes reviewed:  11/11/20 derm note    Provider Disclosure:   The documentation recorded by the scribe accurately reflects the services I personally performed and the decisions made by me.    Aditi Faustin, MS, GALILEO      Scribe Disclosure:  I, Cindy Gruber, am serving as a scribe to document services personally performed by Aditi Faustin PA-C based on data collection and the provider's statements to me.   ____________________________________________________    HPI:  Mr. Sea Solis is a(n) 41 year old male who presents today as a return patient for scars on the chest with prior improvement with IL TAC. Patient states this has been present for a while.  Patient reports the following symptoms: bothersome .  Patient reports the following previous treatments: IL TAC.  Patient reports the following modifying factors: none.  Associated symptoms: none.  Patient has no other skin complaints today.  Remainder of the HPI, Meds, PMH, Allergies, FH, and SH was reviewed in chart.       Pertinent findings: No personal or family history of skin cancer    Medications:  Current Outpatient  Medications   Medication     loratadine (CLARITIN) 10 MG tablet     atovaquone-proguanil (MALARONE) 250-100 MG tablet     No current facility-administered medications for this visit.        Past Medical History:   Patient Active Problem List   Diagnosis     Latent tuberculosis     Erectile dysfunction, unspecified erectile dysfunction type     Hyperlipidemia     Tension headache     Sleep disturbance     Vitamin D deficiency     Past Medical History:   Diagnosis Date     Hyperlipidemia 3/21/2018       Past Surgical History:   No past surgical history on file.    Family History:  No family history on file.    Social History:  Social History     Tobacco Use     Smoking status: Former Smoker     Smokeless tobacco: Never Used   Substance Use Topics     Alcohol use: Yes     Alcohol/week: 0.0 standard drinks     Comment: 2-3x week          Review Of Systems:  Skin: scars on the chest  Eyes: negative  Ears/Nose/Throat: negative  Respiratory: No shortness of breath, dyspnea on exertion, cough, or hemoptysis  Cardiovascular: negative  Gastrointestinal: negative  Genitourinary: negative  Musculoskeletal: negative  Neurologic: negative  Psychiatric: negative  Hematologic/Lymphatic/Immunologic: negative  Endocrine: negative      Objective:     /78   Eyes: Conjunctivae/lids: Normal   ENT: Lips:  Normal  MSK: Normal  Cardiovascular: Peripheral edema none  Pulm: Breathing Normal  Neuro/Psych: Orientation: Normal; Mood/Affect: Normal, NAD, WDWN  Pt accompanied by: self  Following areas examined:face, neck, chest. Pt wearing mask  Villafana skin type: V   Findings:  Smooth brown firm papules on mid chest x 3         CC No referring provider defined for this encounter. on close of this encounter.

## 2022-02-03 NOTE — LETTER
2/3/2022         RE: Sea Solis  [address] [address] [place] [address]        Dear Colleague,    Thank you for referring your patient, Sea Solis, to the [facility]. Please see a copy of my visit note below.    [facility] Note  Encounter Date: Feb 3, 2022  Office Visit   ____________________________________________    Assessment & Plan:    1. Keloids, mid chest x 3.   - IL TAC: PGACAC discussed.  Risks including but not limited to injection site reaction, bruising, no resolution.  All questions answered and entertained to patient s satisfaction.  Informed consent obtained.  IL TAC in concentration of 40 mg/ml was injected ID to 3 keloids.  Total injected was 6 ml.  Patient tolerated without complications and given wound care instructions, including not to move product around.  Return in 4 weeks for follow-up and possible additional IL TAC. May need additional treatment. May not be effective.    Lot #:[id number]  Exp: 09.2022        Follow-up: 1 month(s) in-person, or earlier for new or changing lesions    Labs and office visit notes reviewed:  11/11/20 derm note    Provider Disclosure:   The documentation recorded by the scribe accurately reflects the services I personally performed and the decisions made by me.    Aditi Faustin, MS, GALILEO      Scribe Disclosure:  I, Cindy Gruber, am serving as a scribe to document services personally performed by Aditi Faustin PA-C based on data collection and the provider's statements to me.   ____________________________________________________    HPI:  Mr. Sea Solis is a(n) 41 year old male who presents today as a return patient for scars on the chest with prior improvement with IL TAC. Patient states this has been present for a while.  Patient reports the following symptoms: bothersome .  Patient reports the following previous treatments: IL TAC.  Patient reports the following modifying factors: none.   Associated symptoms: none.  Patient has no other skin complaints today.  Remainder of the HPI, Meds, PMH, Allergies, FH, and SH was reviewed in chart.       Pertinent findings: No personal or family history of skin cancer    Medications:  Current Outpatient Medications   Medication     loratadine (CLARITIN) 10 MG tablet     atovaquone-proguanil (MALARONE) 250-100 MG tablet     No current facility-administered medications for this visit.        Past Medical History:   Patient Active Problem List   Diagnosis     Latent tuberculosis     Erectile dysfunction, unspecified erectile dysfunction type     Hyperlipidemia     Tension headache     Sleep disturbance     Vitamin D deficiency     Past Medical History:   Diagnosis Date     Hyperlipidemia 3/21/2018       Past Surgical History:   No past surgical history on file.    Family History:  No family history on file.    Social History:  Social History     Tobacco Use     Smoking status: Former Smoker     Smokeless tobacco: Never Used   Substance Use Topics     Alcohol use: Yes     Alcohol/week: 0.0 standard drinks     Comment: 2-3x week          Review Of Systems:  Skin: scars on the chest  Eyes: negative  Ears/Nose/Throat: negative  Respiratory: No shortness of breath, dyspnea on exertion, cough, or hemoptysis  Cardiovascular: negative  Gastrointestinal: negative  Genitourinary: negative  Musculoskeletal: negative  Neurologic: negative  Psychiatric: negative  Hematologic/Lymphatic/Immunologic: negative  Endocrine: negative      Objective:     /78   Eyes: Conjunctivae/lids: Normal   ENT: Lips:  Normal  MSK: Normal  Cardiovascular: Peripheral edema none  Pulm: Breathing Normal  Neuro/Psych: Orientation: Normal; Mood/Affect: Normal, NAD, WDWN  Pt accompanied by: self  Following areas examined:face, neck, chest. Pt wearing mask  Villafana skin type: V   Findings:  Smooth brown firm papules on mid chest x 3         CC No referring provider defined for this encounter. on  close of this encounter.      Again, thank you for allowing me to participate in the care of your patient.        Sincerely,        Aditi Faustin PA-C

## 2022-02-03 NOTE — PATIENT INSTRUCTIONS
Today we injected Kenalog. Kenalog is an antiinflammatory medication. We can do injections every four weeks as needed. Atrophy (thinning of the skin) and pigment changes can occur.  Proper skin care from [facility]:    -Eliminate harsh soaps as they strip the natural oils from the skin, often resulting in dry itchy skin ( i.e. Dial, Zest, Shanna Spring)  -Use mild soaps such as Cetaphil or Dove Sensitive Skin in the shower. You do not need to use soap on arms, legs, and trunk every time you shower unless visibly soiled.   -Avoid hot or cold showers.  -After showering, lightly dry off and apply moisturizing within 2-3 minutes. This will help trap moisture in the skin.   -Aggressive use of a moisturizer at least 1-2 times a day to the entire body (including -Vanicream, Cetaphil, Aquaphor or Cerave) and moisturize hands after every washing.  -We recommend using moisturizers that come in a tub that needs to be scooped out, not a pump. This has more of an oil base. It will hold moisture in your skin much better than a water base moisturizer. The above recommended are non-pore clogging.      Wear a sunscreen with at least SPF 30 on your face, ears, neck and V of the chest daily. Wear sunscreen on other areas of the body if those areas are exposed to the sun throughout the day. Sunscreens can contain physical and/or chemical blockers. Physical blockers are less likely to clog pores, these include zinc oxide and titanium dioxide. Reapply every two hour and after swimming.     Sunscreen examples: https://www.ewg.org/sunscreen/    UV radiation  UVA radiation remains constant throughout the day and throughout the year. It is a longer wavelength than UVB and therefore penetrates deeper into the skin leading to immediate and delayed tanning, photoaging, and skin cancer. 70-80% of UVA and UVB radiation occurs between the hours of 10am-2pm.  UVB radiation  UVB radiation causes the most harmful effects and is more  significant during the summer months. However, snow and ice can reflect UVB radiation leading to skin damage during the winter months as well. UVB radiation is responsible for tanning, burning, inflammation, delayed erythema (pinkness), pigmentation (brown spots), and skin cancer.     I recommend self monthly full body exams and yearly full body exams with a dermatology provider. If you develop a new or changing lesion please follow up for examination. Most skin cancers are pink and scaly or pink and pearly. However, we do see blue/brown/black skin cancers.  Consider the ABCDEs of melanoma when giving yourself your monthly full body exam ( don't forget the groin, buttocks, feet, toes, etc). A-asymmetry, B-borders, C-color, D-diameter, E-elevation or evolving. If you see any of these changes please follow up in clinic. If you cannot see your back I recommend purchasing a hand held mirror to use with a larger wall mirror.

## 2022-02-24 NOTE — MR AVS SNAPSHOT
After Visit Summary   2018    Sea Solis    MRN: [id number]           Patient Information     Date Of Birth          1980        Visit Information        Provider Department      2018 2:00 PM Aditi Faustin PA-C [facility]        Care Instructions      Sutured Wound Care     [facility]: [phone]    [facility]: [phone]          ? No strenuous activity for 48 hours. Resume moderate activity in 48 hours. No heavy exercising until you are seen for follow up in one week.     ? Take Tylenol as needed for discomfort.                         ? Do not drink alcoholic beverages for 48 hours.     ? Keep the pressure bandage in place for 24 hours. If the bandage becomes blood tinged or loose, reinforce it with gauze and tape.        (Refer to the reverse side of this page for management of bleeding).    ? Remove pressure bandage in 24 hours     ? Leave the flat bandage in place until your follow up appointment.    ? Keep the bandage dry. Wash around it carefully.    ? If the tape becomes soiled or starts to come off, reinforce it with additional paper tape.    ? Do not smoke for 3 weeks; smoking is detrimental to wound healing.    ? It is normal to have swelling and bruising around the surgical site. The bruising will fade in approximately 10-14 days. Elevate the area to reduce swelling.    ? Numbness, itchiness and sensitivity to temperature changes can occur after surgery and may take up to 18 months to normalize.      POSSIBLE COMPLICATIONS    BLEEDIN. Leave the bandage in place.  2. Use tightly rolled up gauze or a cloth to apply direct pressure over the bandage for 20   minutes.  3. Reapply pressure for an additional 20 minutes if necessary  4. Call the office or go to the nearest emergency room if pressure fails to stop the bleeding.  5. Use additional gauze and tape to maintain pressure once the bleeding has  "stopped.        PAIN:    1. Post operative pain should slowly get better, never worse.  2. A severe increase in pain may indicate a problem. Call the office if this occurs.    In case of emergency phone: [phone]              Follow-ups after your visit        Who to contact     If you have questions or need follow up information about today's clinic visit or your schedule please contact [facility] directly at [phone].  Normal or non-critical lab and imaging results will be communicated to you by [employer], letter or phone within 4 business days after the clinic has received the results. If you do not hear from us within 7 days, please contact the clinic through [employer] or phone. If you have a critical or abnormal lab result, we will notify you by phone as soon as possible.  Submit refill requests through [employer] or call your pharmacy and they will forward the refill request to us. Please allow 3 business days for your refill to be completed.          Additional Information About Your Visit        [employer] Information     [employer] lets you send messages to your doctor, view your test results, renew your prescriptions, schedule appointments and more. To sign up, go to [email or web address] . Click on \"Log in\" on the left side of the screen, which will take you to the Welcome page. Then click on \"Sign up Now\" on the right side of the page.     You will be asked to enter the access code listed below, as well as some personal information. Please follow the directions to create your username and password.     Your access code is: [email or web address]  Expires: 2018 11:23 AM     Your access code will  in 90 days. If you need help or a new code, please call your [facility] clinic or [phone].        Care EveryWhere ID     This is your Care EveryWhere ID. This could be used by other organizations to access your [facility] medical records  [id number]        Your Vitals Were     Pulse " Pulse Oximetry                74 99%           Blood Pressure from Last 3 Encounters:   05/16/18 118/67   04/11/18 110/72   04/10/18 115/64    Weight from Last 3 Encounters:   04/11/18 70.3 kg (155 lb)   03/19/18 71.6 kg (157 lb 12.8 oz)   04/03/17 69.4 kg (153 lb)              Today, you had the following     No orders found for display       Primary Care Provider Office Phone # Fax #    [facility] [phone]       [address] [facility] [address]        Equal Access to Services     [facility]: Hadii aad ku hadasho Soomaali, waaxda luqadaha, qaybta kaalmada adeegyada, martha pierce hayjesseen ademichel rowe . So [facility] [place] [facility] [phone].    ATENCIÓN: Si habla español, tiene a mathias disposición servicios gratuitos de asistencia lingüística. Llame al [phone].    We comply with applicable federal civil rights laws and [place] laws. We do not discriminate on the basis of race, color, national origin, age, disability, sex, sexual orientation, or gender identity.            Thank you!     Thank you for choosing [facility]  for your care. Our goal is always to provide you with excellent care. Hearing back from our patients is one way we can continue to improve our services. Please take a few minutes to complete the written survey that you may receive in the mail after your visit with us. Thank you!             Your Updated Medication List - Protect others around you: Learn how to safely use, store and throw away your medicines at www.disposemymeds.org.          This list is accurate as of 5/16/18  2:48 PM.  Always use your most recent med list.                   Brand Name Dispense Instructions for use Diagnosis    ibuprofen 800 MG tablet    ADVIL/MOTRIN    60 tablet    Take 1 tablet (800 mg) by mouth every 8 hours as needed for moderate pain    Pain of right middle finger       sildenafil 50 MG tablet    VIAGRA    6 tablet    Take 1 tablet (50 mg) by mouth  daily as needed 30 min to 4 hrs before sex. Do not use with nitroglycerin, terazosin or doxazosin.    Erectile dysfunction, unspecified erectile dysfunction type          349

## 2022-03-14 ENCOUNTER — ANCILLARY PROCEDURE (OUTPATIENT)
Dept: NEUROLOGY | Facility: CLINIC | Age: 42
End: 2022-03-14
Attending: PSYCHIATRY & NEUROLOGY
Payer: COMMERCIAL

## 2022-03-14 ENCOUNTER — OFFICE VISIT (OUTPATIENT)
Dept: NEUROLOGY | Facility: CLINIC | Age: 42
End: 2022-03-14
Payer: COMMERCIAL

## 2022-03-14 ENCOUNTER — LAB (OUTPATIENT)
Dept: LAB | Facility: HOSPITAL | Age: 42
End: 2022-03-14
Payer: COMMERCIAL

## 2022-03-14 VITALS
HEIGHT: 67 IN | WEIGHT: 166 LBS | BODY MASS INDEX: 26.06 KG/M2 | DIASTOLIC BLOOD PRESSURE: 69 MMHG | SYSTOLIC BLOOD PRESSURE: 120 MMHG | HEART RATE: 65 BPM

## 2022-03-14 DIAGNOSIS — R41.89 SUBJECTIVE MEMORY COMPLAINTS: ICD-10-CM

## 2022-03-14 DIAGNOSIS — R41.89 SUBJECTIVE MEMORY COMPLAINTS: Primary | ICD-10-CM

## 2022-03-14 LAB
ALBUMIN SERPL-MCNC: 4.3 G/DL (ref 3.5–5)
ALP SERPL-CCNC: 63 U/L (ref 45–120)
ALT SERPL W P-5'-P-CCNC: 21 U/L (ref 0–45)
AMMONIA PLAS-SCNC: 24 UMOL/L (ref 11–35)
ANION GAP SERPL CALCULATED.3IONS-SCNC: 8 MMOL/L (ref 5–18)
AST SERPL W P-5'-P-CCNC: 17 U/L (ref 0–40)
BASOPHILS # BLD AUTO: 0.1 10E3/UL (ref 0–0.2)
BASOPHILS NFR BLD AUTO: 1 %
BILIRUB SERPL-MCNC: 0.4 MG/DL (ref 0–1)
BUN SERPL-MCNC: 8 MG/DL (ref 8–22)
CALCIUM SERPL-MCNC: 8.7 MG/DL (ref 8.5–10.5)
CHLORIDE BLD-SCNC: 104 MMOL/L (ref 98–107)
CO2 SERPL-SCNC: 26 MMOL/L (ref 22–31)
CREAT SERPL-MCNC: 0.91 MG/DL (ref 0.7–1.3)
EOSINOPHIL # BLD AUTO: 0.1 10E3/UL (ref 0–0.7)
EOSINOPHIL NFR BLD AUTO: 3 %
ERYTHROCYTE [DISTWIDTH] IN BLOOD BY AUTOMATED COUNT: 13.8 % (ref 10–15)
ERYTHROCYTE [SEDIMENTATION RATE] IN BLOOD BY WESTERGREN METHOD: 2 MM/HR (ref 0–15)
GFR SERPL CREATININE-BSD FRML MDRD: >90 ML/MIN/1.73M2
GLUCOSE BLD-MCNC: 94 MG/DL (ref 70–125)
HCT VFR BLD AUTO: 47.4 % (ref 40–53)
HGB BLD-MCNC: 15.6 G/DL (ref 13.3–17.7)
IMM GRANULOCYTES # BLD: 0 10E3/UL
IMM GRANULOCYTES NFR BLD: 0 %
LYMPHOCYTES # BLD AUTO: 2.2 10E3/UL (ref 0.8–5.3)
LYMPHOCYTES NFR BLD AUTO: 48 %
MCH RBC QN AUTO: 29.5 PG (ref 26.5–33)
MCHC RBC AUTO-ENTMCNC: 32.9 G/DL (ref 31.5–36.5)
MCV RBC AUTO: 90 FL (ref 78–100)
MONOCYTES # BLD AUTO: 0.3 10E3/UL (ref 0–1.3)
MONOCYTES NFR BLD AUTO: 7 %
NEUTROPHILS # BLD AUTO: 1.8 10E3/UL (ref 1.6–8.3)
NEUTROPHILS NFR BLD AUTO: 41 %
NRBC # BLD AUTO: 0 10E3/UL
NRBC BLD AUTO-RTO: 0 /100
PLATELET # BLD AUTO: 228 10E3/UL (ref 150–450)
POTASSIUM BLD-SCNC: 4 MMOL/L (ref 3.5–5)
PROT SERPL-MCNC: 7.7 G/DL (ref 6–8)
RBC # BLD AUTO: 5.29 10E6/UL (ref 4.4–5.9)
SODIUM SERPL-SCNC: 138 MMOL/L (ref 136–145)
TSH SERPL DL<=0.005 MIU/L-ACNC: 1.09 UIU/ML (ref 0.3–5)
VIT B12 SERPL-MCNC: 462 PG/ML (ref 213–816)
WBC # BLD AUTO: 4.5 10E3/UL (ref 4–11)

## 2022-03-14 PROCEDURE — 86256 FLUORESCENT ANTIBODY TITER: CPT

## 2022-03-14 PROCEDURE — 80053 COMPREHEN METABOLIC PANEL: CPT

## 2022-03-14 PROCEDURE — 86376 MICROSOMAL ANTIBODY EACH: CPT

## 2022-03-14 PROCEDURE — 99214 OFFICE O/P EST MOD 30 MIN: CPT | Performed by: PSYCHIATRY & NEUROLOGY

## 2022-03-14 PROCEDURE — 83519 RIA NONANTIBODY: CPT

## 2022-03-14 PROCEDURE — 83921 ORGANIC ACID SINGLE QUANT: CPT

## 2022-03-14 PROCEDURE — 95816 EEG AWAKE AND DROWSY: CPT | Performed by: PSYCHIATRY & NEUROLOGY

## 2022-03-14 PROCEDURE — 82140 ASSAY OF AMMONIA: CPT

## 2022-03-14 PROCEDURE — 85652 RBC SED RATE AUTOMATED: CPT

## 2022-03-14 PROCEDURE — 86780 TREPONEMA PALLIDUM: CPT

## 2022-03-14 PROCEDURE — 84425 ASSAY OF VITAMIN B-1: CPT

## 2022-03-14 PROCEDURE — 36415 COLL VENOUS BLD VENIPUNCTURE: CPT

## 2022-03-14 PROCEDURE — 82164 ANGIOTENSIN I ENZYME TEST: CPT

## 2022-03-14 PROCEDURE — 85025 COMPLETE CBC W/AUTO DIFF WBC: CPT

## 2022-03-14 PROCEDURE — 82607 VITAMIN B-12: CPT

## 2022-03-14 PROCEDURE — 86038 ANTINUCLEAR ANTIBODIES: CPT

## 2022-03-14 PROCEDURE — 84443 ASSAY THYROID STIM HORMONE: CPT

## 2022-03-14 NOTE — PROGRESS NOTES
NEUROLOGY OUTPATIENT PROGRESS NOTE   Mar 14, 2022     CHIEF COMPLAINT/REASON FOR VISIT/REASON FOR CONSULT  Patient presents with:  RECHECK: memory     REASON FOR CONSULTATION- Memory loss    REFERRAL SOURCE  DrArtur Referred Self  CC  Referred Self    HISTORY OF PRESENT ILLNESS  Sea Solis is a 41 year old male seen today for evaluation of memory problems.  He reports that the memory problems have been going on for the last 4 years.  He remembers that [place] in [place].  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  He is originally from [place].  He went to school there.  He remembers doing good in school but does not remember much about his early life in [place].  Memory problems involved both short-term and long-term.  Does have word finding difficulty.  Also has difficulty staying focused on things.  He will forget conversations that he has had with other people.  He will watch a movie with his wife and not remember what he watched.  He is currently driving for living.  Does report that occasionally he will miss the exit and take the wrong exit.  He will go for walks and then forget how to get back home.  Has to use GPS if he is using a bicycle.  Cannot find the bathroom at home.  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  Denies any changes in his personality.  Feels occasionally that there is a present and hears some noises.  No visual hallucinations.  No auditory hallucinations or people to talk to him.  Will wake up multiple times in the middle of the night.  Reports no drug use good does smoke weed occasionally.  No new medications.  No previous history of mental illness.    3/14/22  Patient returns today.  Reports that he continues to have memory issues.  These have not really changed in nature.  No hallucinations.  No difficulty with sleep.  Is eating well.  Did forget to do a lot of his testing which was supposed to be reviewed today.  Did do the EEG today.  No other new  "concerns.    Previous history is reviewed and this is unchanged.    PAST MEDICAL/SURGICAL HISTORY  Past Medical History:   Diagnosis Date     Hyperlipidemia 3/21/2018     Patient Active Problem List   Diagnosis     Latent tuberculosis     Erectile dysfunction, unspecified erectile dysfunction type     Hyperlipidemia     Tension headache     Sleep disturbance     Vitamin D deficiency   Significant memory loss, nervous breakdown, Depression, sleep disorder, high cholesterol    FAMILY HISTORY  No family history on file.   Positive for multiple family members including father, uncles and cousins with memory issues.  There is also family history of epilepsy and cousins.  There is family history of migraines and cousins.  Grandparents do have some kind of tremor.    SOCIAL HISTORY  Social History     Tobacco Use     Smoking status: Former Smoker     Smokeless tobacco: Never Used   Substance Use Topics     Alcohol use: Yes     Alcohol/week: 0.0 standard drinks     Comment: 2-3x week     Drug use: Yes     Types: Marijuana     Comment: rarely       SYSTEMS REVIEW  Twelve-system ROS was done and other than the HPI this was negative except for neck pain, back pain, dizziness, hearing loss, sleepiness during the day, sleeping problems, headaches, memory loss, anxiety, depression.  No other new issues/concerns.    MEDICATIONS  loratadine (CLARITIN) 10 MG tablet, Take 1 tablet (10 mg) by mouth daily  atovaquone-proguanil (MALARONE) 250-100 MG tablet, Take 1 tablet by mouth daily Start 2 days before exposure to Malaria and continue daily till  7 days after exposure. (Patient not taking: Reported on 11/27/2020)    No current facility-administered medications on file prior to visit.       PHYSICAL EXAMINATION  VITALS: /69 (BP Location: Left arm, Patient Position: Sitting)   Pulse 65   Ht 1.702 m (5' 7\")   Wt 75.3 kg (166 lb)   BMI 26.00 kg/m    GENERAL: Healthy appearing, alert, no acute distress, normal " habitus.  CARDIOVASCULAR: Extremities warm and well perfused. Pulses present.   NEUROLOGICAL:  Patient is awake and oriented to self, place and time.  Attention span is normal.  Memory is limited; previously MoCA 8.  Language is fluent and some difficulty follows commands appropriately.  Appropriate fund of knowledge. Cranial nerves 2-12 are intact. There is no pronator drift.  Motor exam shows 5/5 strength in all extremities.  Tone is symmetric bilaterally in upper and lower extremities.  (Previously reflexes are symmetric and 2+ in upper extremities and lower extremities. Sensory exam is grossly intact to light touch, pin prick and vibration.)  Finger to nose and heel to shin is without dysmetria.  Romberg is negative.  Gait is normal and the patient is able to do tandem walk and walk on toes and heels.  No significant change in exam today.    DIAGNOSTICS  OUTSIDE RECORDS  Outside referral notes and chart notes were reviewed and pertinent information has been summarized (in addition to the HPI):-Patient having some memory issues.  There are some notes describing some lesions in the genital area.  He has had testing for STD done.  No relevant notes available.  Patient has possibly had some testing for syphilis in the past.    EEG  IMPRESSION/REPORT/PLAN  This is a normal EEG during wakefulness and drowsiness except for mild generalized background dysrhythmia. Further clinical correlation is needed.     Please note that the absence of epileptiform abnormalities does not exclude the possibility of epilepsy in any patient.        IMPRESSION/REPORT/PLAN  Subjective memory complaints    This is a 41 year old male with progressive cognitive difficulty over the last 4 years.  Patient has multiple family members with cognitive issues also has some family members with epilepsy.  He is young to have dementia.  Would like to be aggressive to look for causes of cognitive decline.  He did score poorly on the [place] previously.   We will start with a MRI of the brain, blood work to look for causes.  EEG today was negative for encephalopathy/seizures.  Could consider lumbar puncture for autoimmune encephalopathy depending on the results of this testing.  We will further set him up with neuropsychology evaluation to see if this is more of a cognitive issue or more for possible psychiatric issues/attention deficit disorder.  I can see him back in about 6 weeks-and encouraged him to complete the testing before he comes for the next appointment.    -     Vitamin B12; Future  -     Vitamin B1 whole blood; Future  -     TSH with free T4 reflex; Future  -     Treponema Abs w Reflex to RPR and Titer; Future  -     Methylmalonic Acid; Future  -     Comprehensive metabolic panel; Future  -     CBC with Platelets & Differential; Future  -     Angiotensin converting enzyme; Future  -     Anti Nuclear Yarely IgG by IFA with Reflex; Future  -     Paraneoplastic antibody; Future  -     Erythrocyte sedimentation rate auto; Future  -     MR Brain w/o & w Contrast; Future  -     Thyroid peroxidase antibody; Future  -     Ammonia; Future  -     ANCA IgG by IFA with Reflex to Titer; Future  -     Neuropsychology Referral; Future    Return in about 6 weeks (around 4/25/2022) for In-Clinic Visit (must), After testing.    Over 32 minutes were spent coordinating the care for the patient on the day of the encounter.  This includes previsit, during visit and post visit activities as documented above.  Counseling patient.  Reviewing EEG results with the patient.  Discussion with nursing staff to see why the patient could not complete the testing.  Counseling patient/encouraging him to get the testing done for the next visit.  (Activities include but not inclusive of reviewing chart, reviewing outside records, reviewing labs and imaging study results as well as the images, patient visit time including getting history and exam,  use if applicable, review of test  results with the patient and coming up with a plan in a shared model, counseling patient and family, education and answering patient questions, EMR , EMR diagnosis entry and problem list management, medication reconciliation and prescription management if applicable, paperwork if applicable, printing documents and documentation of the visit activities.)      Andrey Burton MD  Neurologist  [facility]  Tel:- [phone]    This note was dictated using voice recognition software.  Any grammatical or context distortions are unintentional and inherent to the software.

## 2022-03-14 NOTE — LETTER
3/14/2022         RE: Sea Solis  [address] [facility] [place] [address]        Dear Colleague,    Thank you for referring your patient, Sea Solis, to the [facility] [place]. Please see a copy of my visit note below.    NEUROLOGY OUTPATIENT PROGRESS NOTE   Mar 14, 2022     CHIEF COMPLAINT/REASON FOR VISIT/REASON FOR CONSULT  Patient presents with:  RECHECK: memory     REASON FOR CONSULTATION- Memory loss    REFERRAL SOURCE  DrArtur Referred Self  CC  Referred Self    HISTORY OF PRESENT ILLNESS  Sea Solis is a 41 year old male seen today for evaluation of memory problems.  He reports that the memory problems have been going on for the last 4 years.  He remembers that he [place] to the [place] in [place].  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  He is originally from [place].  He went to school there.  He remembers doing good in school but does not remember much about his early life in [place].  Memory problems involved both short-term and long-term.  Does have word finding difficulty.  Also has difficulty staying focused on things.  He will forget conversations that he has had with other people.  He will watch a movie with his wife and not remember what he watched.  He is currently driving for living.  Does report that occasionally he will miss the exit and take the wrong exit.  He will go for walks and then forget how to get back home.  Has to use GPS if he is using a bicycle.  Cannot find the bathroom at home.  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  Denies any changes in his personality.  Feels occasionally that there is a present and hears some noises.  No visual hallucinations.  No auditory hallucinations or people to talk to him.  Will wake up multiple times in the middle of the night.  Reports no drug use good does smoke weed occasionally.  No new medications.  No previous history of mental illness.    3/14/22  Patient returns today.  Reports  that he continues to have memory issues.  These have not really changed in nature.  No hallucinations.  No difficulty with sleep.  Is eating well.  Did forget to do a lot of his testing which was supposed to be reviewed today.  Did do the EEG today.  No other new concerns.    Previous history is reviewed and this is unchanged.    PAST MEDICAL/SURGICAL HISTORY  Past Medical History:   Diagnosis Date     Hyperlipidemia 3/21/2018     Patient Active Problem List   Diagnosis     Latent tuberculosis     Erectile dysfunction, unspecified erectile dysfunction type     Hyperlipidemia     Tension headache     Sleep disturbance     Vitamin D deficiency   Significant memory loss, nervous breakdown, Depression, sleep disorder, high cholesterol    FAMILY HISTORY  No family history on file.   Positive for multiple family members including father, uncles and cousins with memory issues.  There is also family history of epilepsy and cousins.  There is family history of migraines and cousins.  Grandparents do have some kind of tremor.    SOCIAL HISTORY  Social History     Tobacco Use     Smoking status: Former Smoker     Smokeless tobacco: Never Used   Substance Use Topics     Alcohol use: Yes     Alcohol/week: 0.0 standard drinks     Comment: 2-3x week     Drug use: Yes     Types: Marijuana     Comment: rarely       SYSTEMS REVIEW  Twelve-system ROS was done and other than the HPI this was negative except for neck pain, back pain, dizziness, hearing loss, sleepiness during the day, sleeping problems, headaches, memory loss, anxiety, depression.  No other new issues/concerns.    MEDICATIONS  loratadine (CLARITIN) 10 MG tablet, Take 1 tablet (10 mg) by mouth daily  atovaquone-proguanil (MALARONE) 250-100 MG tablet, Take 1 tablet by mouth daily Start 2 days before exposure to Malaria and continue daily till  7 days after exposure. (Patient not taking: Reported on 11/27/2020)    No current facility-administered medications on file prior  "to visit.       PHYSICAL EXAMINATION  VITALS: /69 (BP Location: Left arm, Patient Position: Sitting)   Pulse 65   Ht 1.702 m (5' 7\")   Wt 75.3 kg (166 lb)   BMI 26.00 kg/m    GENERAL: Healthy appearing, alert, no acute distress, normal habitus.  CARDIOVASCULAR: Extremities warm and well perfused. Pulses present.   NEUROLOGICAL:  Patient is awake and oriented to self, place and time.  Attention span is normal.  Memory is limited; previously MoCA 8.  Language is fluent and some difficulty follows commands appropriately.  Appropriate fund of knowledge. Cranial nerves 2-12 are intact. There is no pronator drift.  Motor exam shows 5/5 strength in all extremities.  Tone is symmetric bilaterally in upper and lower extremities.  (Previously reflexes are symmetric and 2+ in upper extremities and lower extremities. Sensory exam is grossly intact to light touch, pin prick and vibration.)  Finger to nose and heel to shin is without dysmetria.  Romberg is negative.  Gait is normal and the patient is able to do tandem walk and walk on toes and heels.  No significant change in exam today.    DIAGNOSTICS  OUTSIDE RECORDS  Outside referral notes and chart notes were reviewed and pertinent information has been summarized (in addition to the HPI):-Patient having some memory issues.  There are some notes describing some lesions in the genital area.  He has had testing for STD done.  No relevant notes available.  Patient has possibly had some testing for syphilis in the past.    EEG  IMPRESSION/REPORT/PLAN  This is a normal EEG during wakefulness and drowsiness except for mild generalized background dysrhythmia. Further clinical correlation is needed.     Please note that the absence of epileptiform abnormalities does not exclude the possibility of epilepsy in any patient.        IMPRESSION/REPORT/PLAN  Subjective memory complaints    This is a 41 year old male with progressive cognitive difficulty over the last 4 years.  " Patient has multiple family members with cognitive issues also has some family members with epilepsy.  He is young to have dementia.  Would like to be aggressive to look for causes of cognitive decline.  He did score poorly on the [place] previously.  We will start with a MRI of the brain, blood work to look for causes.  EEG today was negative for encephalopathy/seizures.  Could consider lumbar puncture for autoimmune encephalopathy depending on the results of this testing.  We will further set him up with neuropsychology evaluation to see if this is more of a cognitive issue or more for possible psychiatric issues/attention deficit disorder.  I can see him back in about 6 weeks-and encouraged him to complete the testing before he comes for the next appointment.    -     Vitamin B12; Future  -     Vitamin B1 whole blood; Future  -     TSH with free T4 reflex; Future  -     Treponema Abs w Reflex to RPR and Titer; Future  -     Methylmalonic Acid; Future  -     Comprehensive metabolic panel; Future  -     CBC with Platelets & Differential; Future  -     Angiotensin converting enzyme; Future  -     Anti Nuclear Yarely IgG by IFA with Reflex; Future  -     Paraneoplastic antibody; Future  -     Erythrocyte sedimentation rate auto; Future  -     MR Brain w/o & w Contrast; Future  -     Thyroid peroxidase antibody; Future  -     Ammonia; Future  -     ANCA IgG by IFA with Reflex to Titer; Future  -     Neuropsychology Referral; Future    Return in about 6 weeks (around 4/25/2022) for In-Clinic Visit (must), After testing.    Over 32 minutes were spent coordinating the care for the patient on the day of the encounter.  This includes previsit, during visit and post visit activities as documented above.  Counseling patient.  Reviewing EEG results with the patient.  Discussion with nursing staff to see why the patient could not complete the testing.  Counseling patient/encouraging him to get the testing done for the next  visit.  (Activities include but not inclusive of reviewing chart, reviewing outside records, reviewing labs and imaging study results as well as the images, patient visit time including getting history and exam,  use if applicable, review of test results with the patient and coming up with a plan in a shared model, counseling patient and family, education and answering patient questions, EMR , EMR diagnosis entry and problem list management, medication reconciliation and prescription management if applicable, paperwork if applicable, printing documents and documentation of the visit activities.)      Andrey Burton MD  Neurologist  [facility]  Tel:- [phone]    This note was dictated using voice recognition software.  Any grammatical or context distortions are unintentional and inherent to the software.        Again, thank you for allowing me to participate in the care of your patient.        Sincerely,        Andrey Burton MD

## 2022-03-14 NOTE — NURSING NOTE
Chief Complaint   Patient presents with     RECHECK     memory      Familia Araujo MA on 3/14/2022 at 1:52 PM

## 2022-03-15 LAB
T PALLIDUM AB SER QL: NONREACTIVE
THYROPEROXIDASE AB SERPL-ACNC: <3 IU/ML (ref 0–6)

## 2022-03-16 ENCOUNTER — OFFICE VISIT (OUTPATIENT)
Dept: DERMATOLOGY | Facility: CLINIC | Age: 42
End: 2022-03-16
Payer: COMMERCIAL

## 2022-03-16 VITALS — DIASTOLIC BLOOD PRESSURE: 70 MMHG | SYSTOLIC BLOOD PRESSURE: 112 MMHG

## 2022-03-16 DIAGNOSIS — A63.0 CONDYLOMA ACUMINATUM: Primary | ICD-10-CM

## 2022-03-16 LAB
ACE SERPL-CCNC: 37 U/L
ANA SER QL IF: NEGATIVE
ANCA AB PATTERN SER IF-IMP: NORMAL
C-ANCA TITR SER IF: NORMAL {TITER}

## 2022-03-16 PROCEDURE — 54056 CRYOSURGERY PENIS LESION(S): CPT | Performed by: PHYSICIAN ASSISTANT

## 2022-03-16 PROCEDURE — 11900 INJECT SKIN LESIONS </W 7: CPT | Performed by: PHYSICIAN ASSISTANT

## 2022-03-16 PROCEDURE — 99212 OFFICE O/P EST SF 10 MIN: CPT | Mod: 25 | Performed by: PHYSICIAN ASSISTANT

## 2022-03-16 RX ORDER — PODOFILOX 5 MG/ML
SOLUTION TOPICAL
Qty: 3.5 ML | Refills: 3 | Status: SHIPPED | OUTPATIENT
Start: 2022-03-16 | End: 2023-04-11

## 2022-03-16 RX ORDER — TRIAMCINOLONE ACETONIDE 40 MG/ML
40 INJECTION, SUSPENSION INTRA-ARTICULAR; INTRAMUSCULAR ONCE
Status: COMPLETED | OUTPATIENT
Start: 2022-03-16 | End: 2022-03-16

## 2022-03-16 RX ADMIN — TRIAMCINOLONE ACETONIDE 40 MG: 40 INJECTION, SUSPENSION INTRA-ARTICULAR; INTRAMUSCULAR at 13:11

## 2022-03-16 NOTE — LETTER
3/16/2022         RE: Sea Solis  [address] [facility] [facility] [address]        Dear Colleague,    Thank you for referring your patient, Sea Solis, to the [facility]. Please see a copy of my visit note below.    HPI:  Sea Solis is a 41 year old male patient here today for keloids on chest .  Patient states this has been present for a while.  Patient reports the following symptoms: bothersome .  Patient reports the following previous treatments: IL 40 with improvement.  Patient reports the following modifying factors: none.  Associated symptoms: none. Also has some spots on penis. No tx tried. .  Patient has no other skin complaints today.  Remainder of the HPI, Meds, PMH, Allergies, FH, and SH was reviewed in chart.    Pertinent Hx:   keloids  Past Medical History:   Diagnosis Date     Hyperlipidemia 3/21/2018       No past surgical history on file.     No family history on file.    Social History     Socioeconomic History     Marital status:      Spouse name: Not on file     Number of children: Not on file     Years of education: Not on file     Highest education level: Not on file   Occupational History     Not on file   Tobacco Use     Smoking status: Former Smoker     Smokeless tobacco: Never Used   Substance and Sexual Activity     Alcohol use: Yes     Alcohol/week: 0.0 standard drinks     Comment: 2-3x week     Drug use: Yes     Types: Marijuana     Comment: rarely     Sexual activity: Yes     Partners: Female   Other Topics Concern     Parent/sibling w/ CABG, MI or angioplasty before 65F 55M? Not Asked   Social History Narrative     Not on file     Social Determinants of Health     Financial Resource Strain: Not on file   Food Insecurity: Not on file   Transportation Needs: Not on file   Physical Activity: Not on file   Stress: Not on file   Social Connections: Not on file   Intimate Partner Violence: Not on file   Housing Stability: Not on  file       Outpatient Encounter Medications as of 3/16/2022   Medication Sig Dispense Refill     podofilox (CONDYLOX) 0.5 % external solution Apply to affected area on penis q12 hrs x 3 days, off x 4 days. May repeat qwk x 1-4 weeks. Avoid surrounding skin. 3.5 mL 3     atovaquone-proguanil (MALARONE) 250-100 MG tablet Take 1 tablet by mouth daily Start 2 days before exposure to Malaria and continue daily till  7 days after exposure. (Patient not taking: Reported on 11/27/2020) 70 tablet 0     loratadine (CLARITIN) 10 MG tablet Take 1 tablet (10 mg) by mouth daily (Patient not taking: Reported on 3/16/2022) 30 tablet 1     Facility-Administered Encounter Medications as of 3/16/2022   Medication Dose Route Frequency Provider Last Rate Last Admin     [COMPLETED] triamcinolone (KENALOG-40) injection 40 mg  40 mg Intra-Lesional Once Aditi Faustin PA-C   40 mg at 03/16/22 1311       Review Of Systems:  Skin: spots, keloid  Eyes: negative  Ears/Nose/Throat: negative  Respiratory: No shortness of breath, dyspnea on exertion, cough, or hemoptysis  Cardiovascular: negative  Gastrointestinal: negative  Genitourinary: negative  Musculoskeletal: negative  Neurologic: negative  Psychiatric: negative  Hematologic/Lymphatic/Immunologic: negative  Endocrine: negative      Objective:     /70   Eyes: Conjunctivae/lids: Normal   ENT: Lips:  Normal  MSK: Normal  Cardiovascular: Peripheral edema none  Pulm: Breathing Normal  Neuro/Psych: Orientation: A/O x 3. Normal; Mood/Affect: Normal, NAD, WDWN  Pt accompanied by: self  Following areas examined: face, neck, chest, penis, suprapubic  Villafana skin type:v   Findings:  Smooth brown firm papules on mid chest x 3  Flesh-colored verrucous appearing papules on penis x 7and left suprapubic x 1    Assessment and Plan:     1) keloid x 3 on mid chest  IL TAC: PGACAC discussed.  Risks including but not limited to injection site reaction, bruising, no resolution.  All questions  answered and entertained to patient s satisfaction.  Informed consent obtained.  IL TAC in concentration of 40mg/ml was injected ID to 3 keloids.  Total injected was  1.0 ml.  Patient tolerated without complications and given wound care instructions, including not to move product around.  Return in 4 weeks for follow-up and possible additional IL TAC. May need additional treatment. May not be effective.      2) condyloma acuminata x 8  LN2: Treated with LN2 for 5s for 1-2 cycles. Warned risks of blistering, pain, pigment change, scarring, and incomplete resolution.  Advised patient to return if lesions do not completely resolve within 2-3 months.  Wound care sheet given.  Begin podofolix with flares.    Genital warts are caused by the human papillomavirus (HPV) and are spread through direct skin-to-skin contact. It is a sexually transmitted infection that typically appear months to years after exposure. Subtypes 6 and 11 are most commonly responsible for genital warts and Subtypes 16 and 18 can cause cervical cancer.       Reviewed pertinent charts and labs prior to office visit.     It was a pleasure speaking with Sea Solis today.       Follow up in for additional ln        Again, thank you for allowing me to participate in the care of your patient.        Sincerely,        Aditi Faustin PA-C

## 2022-03-16 NOTE — PROGRESS NOTES
HPI:  Sea Solis is a 41 year old male patient here today for keloids on chest .  Patient states this has been present for a while.  Patient reports the following symptoms: bothersome .  Patient reports the following previous treatments: IL 40 with improvement.  Patient reports the following modifying factors: none.  Associated symptoms: none. Also has some spots on penis. No tx tried. .  Patient has no other skin complaints today.  Remainder of the HPI, Meds, PMH, Allergies, FH, and SH was reviewed in chart.    Pertinent Hx:   keloids  Past Medical History:   Diagnosis Date     Hyperlipidemia 3/21/2018       No past surgical history on file.     No family history on file.    Social History     Socioeconomic History     Marital status:      Spouse name: Not on file     Number of children: Not on file     Years of education: Not on file     Highest education level: Not on file   Occupational History     Not on file   Tobacco Use     Smoking status: Former Smoker     Smokeless tobacco: Never Used   Substance and Sexual Activity     Alcohol use: Yes     Alcohol/week: 0.0 standard drinks     Comment: 2-3x week     Drug use: Yes     Types: Marijuana     Comment: rarely     Sexual activity: Yes     Partners: Female   Other Topics Concern     Parent/sibling w/ CABG, MI or angioplasty before 65F 55M? Not Asked   Social History Narrative     Not on file     Social Determinants of Health     Financial Resource Strain: Not on file   Food Insecurity: Not on file   Transportation Needs: Not on file   Physical Activity: Not on file   Stress: Not on file   Social Connections: Not on file   Intimate Partner Violence: Not on file   Housing Stability: Not on file       Outpatient Encounter Medications as of 3/16/2022   Medication Sig Dispense Refill     podofilox (CONDYLOX) 0.5 % external solution Apply to affected area on penis q12 hrs x 3 days, off x 4 days. May repeat qwk x 1-4 weeks. Avoid surrounding skin. 3.5 mL 3      atovaquone-proguanil (MALARONE) 250-100 MG tablet Take 1 tablet by mouth daily Start 2 days before exposure to Malaria and continue daily till  7 days after exposure. (Patient not taking: Reported on 11/27/2020) 70 tablet 0     loratadine (CLARITIN) 10 MG tablet Take 1 tablet (10 mg) by mouth daily (Patient not taking: Reported on 3/16/2022) 30 tablet 1     Facility-Administered Encounter Medications as of 3/16/2022   Medication Dose Route Frequency Provider Last Rate Last Admin     [COMPLETED] triamcinolone (KENALOG-40) injection 40 mg  40 mg Intra-Lesional Once Aditi Faustin PA-C   40 mg at 03/16/22 1311       Review Of Systems:  Skin: spots, keloid  Eyes: negative  Ears/Nose/Throat: negative  Respiratory: No shortness of breath, dyspnea on exertion, cough, or hemoptysis  Cardiovascular: negative  Gastrointestinal: negative  Genitourinary: negative  Musculoskeletal: negative  Neurologic: negative  Psychiatric: negative  Hematologic/Lymphatic/Immunologic: negative  Endocrine: negative      Objective:     /70   Eyes: Conjunctivae/lids: Normal   ENT: Lips:  Normal  MSK: Normal  Cardiovascular: Peripheral edema none  Pulm: Breathing Normal  Neuro/Psych: Orientation: A/O x 3. Normal; Mood/Affect: Normal, NAD, WDWN  Pt accompanied by: self  Following areas examined: face, neck, chest, penis, suprapubic  Villafana skin type:v   Findings:  Smooth brown firm papules on mid chest x 3  Flesh-colored verrucous appearing papules on penis x 7and left suprapubic x 1    Assessment and Plan:     1) keloid x 3 on mid chest  IL TAC: PGACAC discussed.  Risks including but not limited to injection site reaction, bruising, no resolution.  All questions answered and entertained to patient s satisfaction.  Informed consent obtained.  IL TAC in concentration of 40mg/ml was injected ID to 3 keloids.  Total injected was  1.0 ml.  Patient tolerated without complications and given wound care instructions, including not  to move product around.  Return in 4 weeks for follow-up and possible additional IL TAC. May need additional treatment. May not be effective.      2) condyloma acuminata x 8  LN2: Treated with LN2 for 5s for 1-2 cycles. Warned risks of blistering, pain, pigment change, scarring, and incomplete resolution.  Advised patient to return if lesions do not completely resolve within 2-3 months.  Wound care sheet given.  Begin podofolix with flares.    Genital warts are caused by the human papillomavirus (HPV) and are spread through direct skin-to-skin contact. It is a sexually transmitted infection that typically appear months to years after exposure. Subtypes 6 and 11 are most commonly responsible for genital warts and Subtypes 16 and 18 can cause cervical cancer.       Reviewed pertinent charts and labs prior to office visit.     It was a pleasure speaking with Sea Solis today.       Follow up in for additional ln

## 2022-03-16 NOTE — PATIENT INSTRUCTIONS
Today we injected Kenalog. Kenalog is an antiinflammatory medication. We can do injections every four weeks as needed. Atrophy (thinning of the skin) and pigment changes can occur.    WOUND CARE INSTRUCTIONS  FOR CRYOSURGERY  For questions please call [phone]        This area treated with liquid nitrogen will form a blister. You do not need to bandage the area until after the blister forms and breaks (which may be a few days).  When the blister breaks, begin daily dressing changes as follows:    1) Clean and dry the area with tap water using clean Q-tip or sterile gauze pad.    2) Apply Vaseline or Aquaphor over entire wound. Other options include Polysporin ointment or Bacitracin ointment over entire wound.  Do NOT use Neosporin ointment.    3) Cover the wound with a band-aid or sterile non-stick gauze pad and micropore paper tape.      REPEAT THESE INSTRUCTIONS AT LEAST ONCE A DAY UNTIL THE WOUND HAS COMPLETELY HEALED.        It is an old wives tale that a wound heals better when it is exposed to air and allowed to dry out. The wound will heal faster with a better cosmetic result if it is kept moist with ointment and covered with a bandage.  Do not let the wound dry out.      Supplies Needed:     *Cotton tipped applicators (Q-tips)   *Polysporin ointment or Bacitracin ointment (NOT NEOSPORIN)   *Band-aids, or non stick gauze pads and micropore paper tape    PATIENT INFORMATION    During the healing process you will notice a number of changes. All wounds develop a small halo of redness surrounding the wound.  This means healing is occurring. Severe itching with extensive redness usually indicates sensitivity to the ointment or bandage tape used to dress the wound.  You should call our office if this develops.      Swelling and/or discoloration around your surgical site is common, particularly when performed around the eye.    All wounds normally drain.  The larger the wound the more drainage there will be.   After 7-10 days, you will notice the wound beginning to shrink and new skin will begin to grow.  The wound is healed when you can see skin has formed over the entire area.  A healed wound has a healthy, shiny look to the surface and is red to dark pink in color to normalize.  Wounds may take approximately 4-6 weeks to heal.  Larger wounds may take 6-8 weeks.  After the wound is healed you may discontinue dressing changes.    You may experience a sensation of tightness as your wound heals. This is normal and will gradually subside.    Your healed wound may be sensitive to temperature changes. This sensitivity improves with time, but if you re having a lot of discomfort, try to avoid temperature extremes.    Patients frequently experience itching after their wound appears to have healed because of the continue healing under the skin.  Plain Vaseline will help relieve the itching.

## 2022-03-17 LAB — METHYLMALONATE SERPL-SCNC: 0.12 UMOL/L (ref 0–0.4)

## 2022-03-19 LAB — VIT B1 PYROPHOSHATE BLD-SCNC: 75 NMOL/L

## 2022-03-22 LAB
AMPHIPHYSIN IGG TITR SER IF: NEGATIVE TITER
CV2 IGG TITR SER IF: NEGATIVE TITER
GLIAL NUC TYPE 1 IGG TITR SER IF: NEGATIVE TITER
HU1 IGG TITR SER IF: NEGATIVE TITER
HU2 IGG TITR SER IF: NEGATIVE TITER
HU3 IGG TITR SER IF: NEGATIVE TITER
LABORATORY COMMENT REPORT: NORMAL
NACHR AB SER IA-SCNC: 0 NMOL/L
PARANEOPLASTIC AB SER-IMP: NORMAL
PCA-1 IGG TITR SER IF: NEGATIVE TITER
PCA-2 IGG TITR SER IF: NEGATIVE TITER
PCA-TR IGG TITR SER IF: NEGATIVE TITER
VGCC-P/Q BIND IGG+IGM SER IA-SCNC: 0 NMOL/L
VGKC AB SER IA-SCNC: 0 NMOL/L

## 2022-03-23 ENCOUNTER — HOSPITAL ENCOUNTER (OUTPATIENT)
Dept: MRI IMAGING | Facility: HOSPITAL | Age: 42
Discharge: HOME | End: 2022-03-23
Attending: PSYCHIATRY & NEUROLOGY | Admitting: PSYCHIATRY & NEUROLOGY
Payer: COMMERCIAL

## 2022-03-23 DIAGNOSIS — R41.89 SUBJECTIVE MEMORY COMPLAINTS: ICD-10-CM

## 2022-03-23 PROCEDURE — 70553 MRI BRAIN STEM W/O & W/DYE: CPT

## 2022-03-23 PROCEDURE — A9585 GADOBUTROL INJECTION: HCPCS | Performed by: PSYCHIATRY & NEUROLOGY

## 2022-03-23 PROCEDURE — 255N000002 HC RX 255 OP 636: Performed by: PSYCHIATRY & NEUROLOGY

## 2022-03-23 RX ORDER — GADOBUTROL 604.72 MG/ML
8 INJECTION INTRAVENOUS ONCE
Status: COMPLETED | OUTPATIENT
Start: 2022-03-23 | End: 2022-03-23

## 2022-03-23 RX ADMIN — GADOBUTROL 10 ML: 604.72 INJECTION INTRAVENOUS at 15:43

## 2022-04-07 ENCOUNTER — OFFICE VISIT (OUTPATIENT)
Dept: FAMILY MEDICINE | Facility: CLINIC | Age: 42
End: 2022-04-07
Payer: COMMERCIAL

## 2022-04-07 VITALS
BODY MASS INDEX: 26.36 KG/M2 | SYSTOLIC BLOOD PRESSURE: 115 MMHG | TEMPERATURE: 98.4 F | DIASTOLIC BLOOD PRESSURE: 78 MMHG | HEART RATE: 84 BPM | HEIGHT: 66 IN | OXYGEN SATURATION: 98 % | RESPIRATION RATE: 18 BRPM | WEIGHT: 164 LBS

## 2022-04-07 DIAGNOSIS — E78.5 HYPERLIPIDEMIA, UNSPECIFIED HYPERLIPIDEMIA TYPE: ICD-10-CM

## 2022-04-07 DIAGNOSIS — Z00.00 ROUTINE GENERAL MEDICAL EXAMINATION AT A HEALTH CARE FACILITY: Primary | ICD-10-CM

## 2022-04-07 DIAGNOSIS — R09.82 PND (POST-NASAL DRIP): ICD-10-CM

## 2022-04-07 DIAGNOSIS — M54.2 CERVICALGIA: ICD-10-CM

## 2022-04-07 PROCEDURE — 99214 OFFICE O/P EST MOD 30 MIN: CPT | Mod: 25 | Performed by: PHYSICIAN ASSISTANT

## 2022-04-07 PROCEDURE — 99396 PREV VISIT EST AGE 40-64: CPT | Performed by: PHYSICIAN ASSISTANT

## 2022-04-07 RX ORDER — FLUTICASONE PROPIONATE 50 MCG
2 SPRAY, SUSPENSION (ML) NASAL DAILY
Qty: 16 G | Refills: 10 | Status: SHIPPED | OUTPATIENT
Start: 2022-04-07 | End: 2023-04-11

## 2022-04-07 ASSESSMENT — ENCOUNTER SYMPTOMS
CONSTIPATION: 0
PALPITATIONS: 0
ARTHRALGIAS: 0
MYALGIAS: 0
HEMATOCHEZIA: 0
DIZZINESS: 0
HEARTBURN: 0
FREQUENCY: 0
COUGH: 0
FEVER: 0
HEADACHES: 1
DIARRHEA: 0
EYE PAIN: 0
CHILLS: 0
SORE THROAT: 0
HEMATURIA: 0
NAUSEA: 0
DYSURIA: 0
NERVOUS/ANXIOUS: 0
JOINT SWELLING: 0
ABDOMINAL PAIN: 0
SHORTNESS OF BREATH: 0
PARESTHESIAS: 0

## 2022-04-07 ASSESSMENT — PAIN SCALES - GENERAL: PAINLEVEL: NO PAIN (0)

## 2022-04-07 NOTE — PROGRESS NOTES
SUBJECTIVE:   CC: Sea Solis is an 41 year old male who presents for preventative health visit.       Patient has been advised of split billing requirements and indicates understanding: Yes  Healthy Habits:     Getting at least 3 servings of Calcium per day:  Yes    Bi-annual eye exam:  NO    Dental care twice a year:  Yes    Sleep apnea or symptoms of sleep apnea:  None    Diet:  Regular (no restrictions)    Frequency of exercise:  2-3 days/week    Duration of exercise:  15-30 minutes    Taking medications regularly:  Not Applicable    Medication side effects:  Not applicable    PHQ-2 Total Score: 0    Additional concerns today:  No     The 10-year ASCVD risk score (Jasper FONTANEZ Jr., et al., 2013) is: 1.7%    Values used to calculate the score:      Age: 41 years      Sex: Male      Is Non- : No      Diabetic: No      Tobacco smoker: No      Systolic Blood Pressure: 115 mmHg      Is BP treated: No      HDL Cholesterol: 63 mg/dL      Total Cholesterol: 301 mg/dL    Took the statin for 4-5 months.  Did cut back on red meat.  Did eat a lot.      Gets chronic PND, especially at night.  claritin helped a little, for years, but past several months gets sharper pain in throat, denies GERD, denies dysphagia, doesn't;t hurt to swallow, denies other coryza, it feels like the nexk muscles, denies paresthesias    Today's PHQ-2 Score:   PHQ-2 ( 1999 [employer]) 4/7/2022   Q1: Little interest or pleasure in doing things 0   Q2: Feeling down, depressed or hopeless 0   PHQ-2 Score 0   PHQ-2 Total Score (12-17 Years)- Positive if 3 or more points; Administer PHQ-A if positive -   Q1: Little interest or pleasure in doing things Not at all   Q2: Feeling down, depressed or hopeless Not at all   PHQ-2 Score 0       Abuse: Current or Past(Physical, Sexual or Emotional)- NO  Do you feel safe in your environment? YES    Have you ever done Advance Care Planning? (For example, a Health Directive, POLST, or a discussion  with a medical provider or your loved ones about your wishes): No, advance care planning information given to patient to review.  Patient plans to discuss their wishes with loved ones or provider.      Social History     Tobacco Use     Smoking status: Former Smoker     Smokeless tobacco: Never Used   Substance Use Topics     Alcohol use: Yes     Alcohol/week: 0.0 standard drinks     Comment: 2-3x week     If you drink alcohol do you typically have >3 drinks per day or >7 drinks per week? No    Alcohol Use 4/7/2022   Prescreen: >3 drinks/day or >7 drinks/week? No   Prescreen: >3 drinks/day or >7 drinks/week? -       Last PSA:   PSA   Date Value Ref Range Status   03/19/2018 0.44 0 - 4 ug/L Final     Comment:     Assay Method:  Chemiluminescence using Siemens Vista analyzer       Reviewed orders with patient. Reviewed health maintenance and updated orders accordingly - Yes      Reviewed and updated as needed this visit by clinical staff   Tobacco  Allergies  Meds  Problems  Med Hx  Surg Hx  Fam Hx  Soc   Hx        Reviewed and updated as needed this visit by Provider                     Review of Systems   Constitutional: Negative for chills and fever.   HENT: Negative for congestion, ear pain, hearing loss and sore throat.    Eyes: Negative for pain and visual disturbance.   Respiratory: Negative for cough and shortness of breath.    Cardiovascular: Negative for chest pain, palpitations and peripheral edema.   Gastrointestinal: Negative for abdominal pain, constipation, diarrhea, heartburn, hematochezia and nausea.   Genitourinary: Negative for dysuria, frequency, genital sores, hematuria, impotence, penile discharge and urgency.   Musculoskeletal: Negative for arthralgias, joint swelling and myalgias.   Skin: Negative for rash.   Neurological: Positive for headaches. Negative for dizziness and paresthesias.   Psychiatric/Behavioral: Negative for mood changes. The patient is not nervous/anxious.      "    OBJECTIVE:   /78 (BP Location: Left arm, Patient Position: Chair, Cuff Size: Adult Large)   Pulse 84   Temp 98.4  F (36.9  C) (Tympanic)   Resp 18   Ht 1.676 m (5' 6\")   Wt 74.4 kg (164 lb)   SpO2 98%   BMI 26.47 kg/m      Physical Exam  GENERAL: healthy, alert and no distress  EYES: Eyes grossly normal to inspection, PERRL and conjunctivae and sclerae normal  HENT: ear canals and TM's normal, nose and mouth without ulcers or lesions  NECK: no adenopathy, no asymmetry, masses, or scars    RESP: lungs clear to auscultation - no rales, rhonchi or wheezes  CV: regular rate and rhythm, normal S1 S2, no S3 or S4, no murmur, click or rub, no peripheral edema and peripheral pulses strong  ABDOMEN: soft, nontender, no hepatosplenomegaly, no masses and bowel sounds normal  MS: no gross musculoskeletal defects noted, no edema  SKIN: no suspicious lesions or rashes  NEURO: Normal strength and tone, mentation intact and speech normal  PSYCH: mentation appears normal, affect normal/bright    Diagnostic Test Results:  none     ASSESSMENT/PLAN:   Sea was seen today for physical.    Diagnoses and all orders for this visit:    Routine general medical examination at a health care facility    Hyperlipidemia, unspecified hyperlipidemia type  -     Lipid panel reflex to direct LDL Fasting; Future  -     Comprehensive metabolic panel; Future  -     Extra Tube; Future    PND (post-nasal drip)  -     fluticasone (FLONASE) 50 MCG/ACT nasal spray; Spray 2 sprays into both nostrils daily    Cervicalgia  -     Physical Therapy Referral; Future    Other orders  -     REVIEW OF HEALTH MAINTENANCE PROTOCOL ORDERS      Patient's risk as above is low but his chol is very high, likely large genetic component and could benefit form staitn, will recheck and discuss at f/u.        COUNSELING:   Reviewed preventive health counseling, as reflected in patient instructions    Estimated body mass index is 26.47 kg/m  as calculated from " "the following:    Height as of this encounter: 1.676 m (5' 6\").    Weight as of this encounter: 74.4 kg (164 lb).     Weight management plan: Discussed healthy diet and exercise guidelines    He reports that he has quit smoking. He has never used smokeless tobacco.      Counseling Resources:  ATP IV Guidelines  Pooled Cohorts Equation Calculator  FRAX Risk Assessment  ICSI Preventive Guidelines  Dietary Guidelines for Americans, 2010  USDA's MyPlate  ASA Prophylaxis  Lung CA Screening    ADELA Mendez  [facility]  "

## 2022-04-07 NOTE — PATIENT INSTRUCTIONS
At [facility], we strive to deliver an exceptional experience to you, every time we see you. If you receive a survey, please complete it as we do value your feedback.  If you have MyChart, you can expect to receive results automatically within 24 hours of their completion.  Your provider will send a note interpreting your results as well.   If you do not have MyChart, you should receive your results in about a week by mail.    Your care team:                            Family Medicine Internal Medicine   MD Eric Best MD Shantel Branch-Fleming, MD Srinivasa Vaka, MD Katya Belousova, GALILEO ArmstrongHillJENNIFER Alvares CNP, MD Pediatrics   Trevor Aguiar, MD Sveta Redding MD Amelia Massimini APRN CNP   Fatemeh Dodson APRN CNP Shruti Brown MD             Clinic hours: Monday - Thursday 7 am-6 pm; Fridays 7 am-5 pm.   Urgent care: Monday - Friday 10 am- 8 pm; Saturday and Sunday 9 am-5 pm.    Clinic: [phone]       [facility]: Monday - Thursday 8 am - 7 pm; Friday 8 am - 6 pm  [facility]: [phone]       Preventive Health Recommendations  Male Ages 40 to 49    Yearly exam:             See your health care provider every year in order to  o   Review health changes.   o   Discuss preventive care.    o   Review your medicines if your doctor has prescribed any.    You should be tested each year for STDs (sexually transmitted diseases) if you re at risk.     Have a cholesterol test every 5 years.     Have a colonoscopy (test for colon cancer) if someone in your family has had colon cancer or polyps before age 50.     After age 45, have a diabetes test (fasting glucose). If you are at risk for diabetes, you should have this test every 3 years.      Talk with your health care provider about whether or not a prostate cancer screening test (PSA) is right for  you.    Shots: Get a flu shot each year. Get a tetanus shot every 10 years.     Nutrition:    Eat at least 5 servings of fruits and vegetables daily.     Eat whole-grain bread, whole-wheat pasta and brown rice instead of white grains and rice.     Get adequate Calcium and Vitamin D.     Lifestyle    Exercise for at least 150 minutes a week (30 minutes a day, 5 days a week). This will help you control your weight and prevent disease.     Limit alcohol to one drink per day.     No smoking.     Wear sunscreen to prevent skin cancer.     See your dentist every six months for an exam and cleaning.      Patient Education     Chronic Sinusitis   Chronic sinusitis is a long-term swelling or infection of the sinuses. If sinusitis lasts more than 12 weeks (90 days), it is called chronic.    What is chronic sinusitis?  Sinuses are air-filled spaces in the skull behind the face. They are kept moist and clean by a lining of mucosa. Things such as pollen, smoke, and chemical fumes can bother the mucosa. Constant exposure to irritants can cause ongoing swelling of this lining. It can also harm tiny hairlike cilia that cover the mucosa. Cilia help carry mucus toward the opening of the sinus. Damage to cilia keeps mucus from draining from the sinuses.  Causes of chronic sinusitis  Problems that irritate the mucosa or block drainage can lead to chronic sinusitis. These may include:    Infections    Chronic allergies    Nasal polyps, deviated septum, or other blockages    Ongoing exposure to irritants, such as cigarette smoke or fumes    Asthma    Acute sinusitis that keeps coming back  Common symptoms of chronic sinusitis  Symptoms may include:    Facial pain and pressure    Headache and sinus pain    Nasal congestion    Thick, colored drainage from the nose    Thick mucus draining down the back of the throat (postnasal drainage)    Loss of smell    Fever    Cough    Sore throat  Diagnosing chronic sinusitis  Your healthcare provider  will ask about your symptoms and past health. He or she will look at your nose and face. You may have imaging studies such as an X-ray or CT scan of the sinuses. Your healthcare provider may also take a sample of the drainage to check for bacteria. You may also have an endoscopy. During this test, the healthcare provider puts a lighted tube up your nose to look at your sinuses.  Treating chronic sinusitis  The goal of treatment is to reduce irritation and swelling. Your plan may involve:    Taking medicines. Your healthcare provider may give you medicines to reduce the amount of mucus and swelling. These help unblock the sinuses and let them drain. You will need to take antibiotics if you have a bacterial infection.    Flushing your sinuses. Your healthcare provider may suggest sinus irrigation. This is flushing your sinuses with saltwater or saline solution. This helps to clear out mucus.    Controlling allergies. If you have allergies, you should have a plan to help control them. You may need to take medicines or get allergy shots.    Controlling nasal irritants. If you smoke, ask your healthcare provider for help to stop.    Having surgery. In some cases, you may need surgery on the nose, sinuses, or both. This can improve sinus drainage or remove nasal blockages.  [employer] last reviewed this educational content on 11/1/2019 2000-2021 [employer]. All rights reserved. This information is not intended as a substitute for professional medical care. Always follow your healthcare professional's instructions.           Patient Education     Treating Chronic Sinusitis    Chronic sinusitis is also known as chronic rhinosinusitis. It is often diagnosed when you have had symptoms for more than 12 weeks, even with medical treatment. The sinuses are hollow areas formed by the bones of the face. Sinuses make and drain mucus. This keeps the nasal passages clean and moist. When the sinuses become swollen  (inflamed) or infected, the condition is called sinusitis. Symptoms may include:     Thick, discolored drainage from the nose    Nasal congestion    Pain and pressure around the eyes, nose, cheeks, or forehead    Headache    Cough    Thick mucus draining down the back of the throat (postnasal drainage)    Fever    Loss of smell  With chronic sinusitis, the symptoms last more than 12 weeks.  Ongoing prevention  It s important to treat the cause of a sinus problem. If you have allergies, talk with your doctor about treatment. Or ask about getting an evaluation by an allergy specialist. If you re exposed to nasal irritants such as sawdust, use a filter mask. If you smoke, ask your doctor for help with quitting. Smoke irritates the sinuses and can make your sinus problem worse. If you live with smokers, ask them to consider quitting or only smoking outdoors. If you live in an area with severe air pollution, monitor the air quality index. Try to limit outdoor time on days with high pollution levels.   Medicine  Medicines for sinusitis may include:    Antibiotics. You may need to take antibiotics for a longer period. If bacteria aren't the cause, antibiotics won't help.    Inhaled corticosteroid medicine. Nasal sprays or drops with steroids are often prescribed.    Other medicines. You may need to take nasal sprays with antihistamines and decongestants, or saltwater (saline) sprays or drops. Your provider may also prescribe mucolytics or expectorants to loosen and clear mucus.    Allergy shots (immunotherapy). If you have nasal allergies, shots may help reduce your sensitivity to allergens such as pollen, dust mites, or mold.   If your symptoms still don't get better, you may need more testing. This may include a CT scan of the sinuses.   Surgery  If other treatments don t solve the problem, you may need surgery. The type of surgery depends on what is causing your sinusitis. It also depends on which sinuses are involved.  Your doctor will tell you more about your options. The types of surgery include:     Endoscopic surgery. This is often used to clear blockages. The sinuses can then heal on their own. During the surgery, the doctor uses a thin, lighted tube (endoscope). The doctor puts the endoscope into your nose to see into the sinuses. This surgery can be done without incisions on the face.    Open surgery. This is often used to clean out a sinus lining that is very damaged. It lets the doctor reach areas that an endoscope may not reach.  [employer] last reviewed this educational content on 7/1/2019 2000-2021 [employer]. All rights reserved. This information is not intended as a substitute for professional medical care. Always follow your healthcare professional's instructions.         Apply ice for 24 hours then alternate heat or ice, which ever feels better. Return to clinic or Emergency Deptartment for uncontrolled pain, chest pain, shortness of breath, fever, numbness/tingling in extremities, inability to move neck. incontinence or urinary problems.     Neck Sprain Or Strain  A sudden force that causes turning or bending of the neck (such as in a car accident) can stretch or tear muscles (strain) and ligaments (sprain) and cause neck pain. Sometimes neck pain occurs after a simple awkward movement. In either case, muscle spasm is commonly present and contributes to the pain    Unless you had a forceful physical injury (for example, a car accident or fall), X-rays are usually not ordered for the initial evaluation of neck pain. If pain continues and dose not respond to medical treatment, x-rays and other tests may be performed at a later time.  Home Care:  1) You may feel more soreness and spasm the first few days after the injury. Reduce your activity level until symptoms begin to improve.  2) When lying down, use a comfortable pillow that supports the head and keeps the spine in a neutral position. The  position of the head should not be tilted forward or backward.  3) Use ice packs (ice in a plastic bag, wrapped in a towel) to treat acute pain. Apply for 20 minutes every 2-4 hours during the first two days. Then, begin local heat (hot shower, hot bath or heating pad) and massage to reduce muscle spasm. Some patients feel best alternating hot and cold treatments, or just staying with one method only. Do what feels the best to you and gives the most relief.  4) You may use acetaminophen (Tylenol) or ibuprofen (Motrin, Advil) to control pain, unless another pain medicine was prescribed. [ NOTE : If you have chronic liver or kidney disease or ever had a stomach ulcer or GI bleeding, talk with your doctor before using these medicines.]  Follow Up  with your physician or this facility if your symptoms do not show signs of improvement after one week. Physical therapy may be needed.  [NOTE: A radiologist will review any X-rays or CT scans that were taken. We will notify you of any new findings that may affect your care.]  Get Prompt Medical Attention  if any of the following occur:  -- Pain becomes worse or spreads into your arms  -- Weakness or numbness in one or both arms    [id number] [facility], [address] [place] [address], [place] [address]. All rights reserved. This information is not intended as a substitute for professional medical care. Always follow your healthcare professional's instructions.

## 2022-04-13 ENCOUNTER — LAB (OUTPATIENT)
Dept: LAB | Facility: CLINIC | Age: 42
End: 2022-04-13
Payer: COMMERCIAL

## 2022-04-13 ENCOUNTER — TELEPHONE (OUTPATIENT)
Dept: FAMILY MEDICINE | Facility: CLINIC | Age: 42
End: 2022-04-13

## 2022-04-13 DIAGNOSIS — E78.5 HYPERLIPIDEMIA, UNSPECIFIED HYPERLIPIDEMIA TYPE: ICD-10-CM

## 2022-04-13 DIAGNOSIS — E78.5 HYPERLIPIDEMIA, UNSPECIFIED HYPERLIPIDEMIA TYPE: Primary | ICD-10-CM

## 2022-04-13 DIAGNOSIS — R41.89 SUBJECTIVE MEMORY COMPLAINTS: ICD-10-CM

## 2022-04-13 LAB
ALBUMIN SERPL-MCNC: 3.8 G/DL (ref 3.4–5)
ALP SERPL-CCNC: 66 U/L (ref 40–150)
ALT SERPL W P-5'-P-CCNC: 38 U/L (ref 0–70)
ANION GAP SERPL CALCULATED.3IONS-SCNC: 5 MMOL/L (ref 3–14)
AST SERPL W P-5'-P-CCNC: 19 U/L (ref 0–45)
BILIRUB SERPL-MCNC: 0.3 MG/DL (ref 0.2–1.3)
BUN SERPL-MCNC: 12 MG/DL (ref 7–30)
CALCIUM SERPL-MCNC: 9.1 MG/DL (ref 8.5–10.1)
CHLORIDE BLD-SCNC: 105 MMOL/L (ref 94–109)
CHOLEST SERPL-MCNC: 329 MG/DL
CO2 SERPL-SCNC: 28 MMOL/L (ref 20–32)
CREAT SERPL-MCNC: 1.01 MG/DL (ref 0.66–1.25)
FASTING STATUS PATIENT QL REPORTED: YES
GFR SERPL CREATININE-BSD FRML MDRD: >90 ML/MIN/1.73M2
GLUCOSE BLD-MCNC: 98 MG/DL (ref 70–99)
HDLC SERPL-MCNC: 56 MG/DL
HOLD SPECIMEN: NORMAL
LDLC SERPL CALC-MCNC: 250 MG/DL
NONHDLC SERPL-MCNC: 273 MG/DL
POTASSIUM BLD-SCNC: 4 MMOL/L (ref 3.4–5.3)
PROT SERPL-MCNC: 7.6 G/DL (ref 6.8–8.8)
SODIUM SERPL-SCNC: 138 MMOL/L (ref 133–144)
TRIGL SERPL-MCNC: 114 MG/DL

## 2022-04-13 PROCEDURE — 80061 LIPID PANEL: CPT

## 2022-04-13 PROCEDURE — 36415 COLL VENOUS BLD VENIPUNCTURE: CPT

## 2022-04-13 PROCEDURE — 80053 COMPREHEN METABOLIC PANEL: CPT

## 2022-04-13 RX ORDER — ATORVASTATIN CALCIUM 10 MG/1
10 TABLET, FILM COATED ORAL DAILY
Qty: 90 TABLET | Refills: 1 | Status: SHIPPED | OUTPATIENT
Start: 2022-04-13 | End: 2023-01-03

## 2022-04-21 ENCOUNTER — IMMUNIZATION (OUTPATIENT)
Dept: NURSING | Facility: CLINIC | Age: 42
End: 2022-04-21
Payer: COMMERCIAL

## 2022-04-21 PROCEDURE — 0051A COVID-19,PF,PFIZER (12+ YRS): CPT

## 2022-04-21 PROCEDURE — 91305 COVID-19,PF,PFIZER (12+ YRS): CPT

## 2022-05-16 ENCOUNTER — OFFICE VISIT (OUTPATIENT)
Dept: DERMATOLOGY | Facility: CLINIC | Age: 42
End: 2022-05-16
Payer: COMMERCIAL

## 2022-05-16 VITALS — OXYGEN SATURATION: 100 % | HEART RATE: 90 BPM | DIASTOLIC BLOOD PRESSURE: 69 MMHG | SYSTOLIC BLOOD PRESSURE: 119 MMHG

## 2022-05-16 DIAGNOSIS — L91.0 KELOID: Primary | ICD-10-CM

## 2022-05-16 DIAGNOSIS — A63.0 CONDYLOMA ACUMINATA: ICD-10-CM

## 2022-05-16 PROCEDURE — 99207 PR DROP WITH A PROCEDURE: CPT | Performed by: PHYSICIAN ASSISTANT

## 2022-05-16 PROCEDURE — 11900 INJECT SKIN LESIONS </W 7: CPT | Mod: 51 | Performed by: PHYSICIAN ASSISTANT

## 2022-05-16 PROCEDURE — 54056 CRYOSURGERY PENIS LESION(S): CPT | Performed by: PHYSICIAN ASSISTANT

## 2022-05-16 RX ORDER — TRIAMCINOLONE ACETONIDE 40 MG/ML
40 INJECTION, SUSPENSION INTRA-ARTICULAR; INTRAMUSCULAR ONCE
Status: COMPLETED | OUTPATIENT
Start: 2022-05-16 | End: 2022-05-16

## 2022-05-16 RX ADMIN — TRIAMCINOLONE ACETONIDE 40 MG: 40 INJECTION, SUSPENSION INTRA-ARTICULAR; INTRAMUSCULAR at 14:21

## 2022-05-16 NOTE — PATIENT INSTRUCTIONS
Proper skin care from [facility]:    -Eliminate harsh soaps as they strip the natural oils from the skin, often resulting in dry itchy skin ( i.e. Dial, Zest, Shanna Spring)  -Use mild soaps such as Cetaphil or Dove Sensitive Skin in the shower. You do not need to use soap on arms, legs, and trunk every time you shower unless visibly soiled.   -Avoid hot or cold showers.  -After showering, lightly dry off and apply moisturizing within 2-3 minutes. This will help trap moisture in the skin.   -Aggressive use of a moisturizer at least 1-2 times a day to the entire body (including -Vanicream, Cetaphil, Aquaphor or Cerave) and moisturize hands after every washing.  -We recommend using moisturizers that come in a tub that needs to be scooped out, not a pump. This has more of an oil base. It will hold moisture in your skin much better than a water base moisturizer. The above recommended are non-pore clogging.      Wear a sunscreen with at least SPF 30 on your face, ears, neck and V of the chest daily. Wear sunscreen on other areas of the body if those areas are exposed to the sun throughout the day. Sunscreens can contain physical and/or chemical blockers. Physical blockers are less likely to clog pores, these include zinc oxide and titanium dioxide. Reapply every two hour and after swimming.     Sunscreen examples: https://www.ewg.org/sunscreen/    UV radiation  UVA radiation remains constant throughout the day and throughout the year. It is a longer wavelength than UVB and therefore penetrates deeper into the skin leading to immediate and delayed tanning, photoaging, and skin cancer. 70-80% of UVA and UVB radiation occurs between the hours of 10am-2pm.  UVB radiation  UVB radiation causes the most harmful effects and is more significant during the summer months. However, snow and ice can reflect UVB radiation leading to skin damage during the winter months as well. UVB radiation is responsible for tanning,  burning, inflammation, delayed erythema (pinkness), pigmentation (brown spots), and skin cancer.     I recommend self monthly full body exams and yearly full body exams with a dermatology provider. If you develop a new or changing lesion please follow up for examination. Most skin cancers are pink and scaly or pink and pearly. However, we do see blue/brown/black skin cancers.  Consider the ABCDEs of melanoma when giving yourself your monthly full body exam ( don't forget the groin, buttocks, feet, toes, etc). A-asymmetry, B-borders, C-color, D-diameter, E-elevation or evolving. If you see any of these changes please follow up in clinic. If you cannot see your back I recommend purchasing a hand held mirror to use with a larger wall mirror.       WOUND CARE INSTRUCTIONS  FOR CRYOSURGERY        This area treated with liquid nitrogen will form a blister. You do not need to bandage the area until after the blister forms and breaks (which may be a few days).  When the blister breaks, begin daily dressing changes as follows:    1) Clean and dry the area with tap water using clean Q-tip or sterile gauze pad.    2) Apply Aquaphor, Vaseline, Polysporin ointment or Bacitracin ointment over entire wound.  Do NOT use Neosporin ointment.    3) Cover the wound with a band-aid or sterile non-stick gauze pad and micropore paper tape.      REPEAT THESE INSTRUCTIONS AT LEAST ONCE A DAY UNTIL THE WOUND HAS COMPLETELY HEALED.        It is an old wives tale that a wound heals better when it is exposed to air and allowed to dry out. The wound will heal faster with a better cosmetic result if it is kept moist with ointment and covered with a bandage.  Do not let the wound dry out.      Supplies Needed:     *Cotton tipped applicators (Q-tips)   *Aquaphor, Vaseline, Polysporin ointment or Bacitracin ointment (NOT NEOSPORIN)   *Band-aids, or non stick gauze pads and micropore paper tape    PATIENT INFORMATION    During the healing process you  will notice a number of changes. All wounds develop a small halo of redness surrounding the wound.  This means healing is occurring. Severe itching with extensive redness usually indicates sensitivity to the ointment or bandage tape used to dress the wound.  You should call our office if this develops.      Swelling and/or discoloration around your surgical site is common, particularly when performed around the eye.    All wounds normally drain.  The larger the wound the more drainage there will be.  After 7-10 days, you will notice the wound beginning to shrink and new skin will begin to grow.  The wound is healed when you can see skin has formed over the entire area.  A healed wound has a healthy, shiny look to the surface and is red to dark pink in color to normalize.  Wounds may take approximately 4-6 weeks to heal.  Larger wounds may take 6-8 weeks.  After the wound is healed you may discontinue dressing changes.    You may experience a sensation of tightness as your wound heals. This is normal and will gradually subside.    Your healed wound may be sensitive to temperature changes. This sensitivity improves with time, but if you re having a lot of discomfort, try to avoid temperature extremes.    Patients frequently experience itching after their wound appears to have healed because of the continue healing under the skin.  Plain Vaseline will help relieve the itching.

## 2022-05-16 NOTE — PROGRESS NOTES
HPI:  Sea Solis is a 41 year old male patient here today for keloids on chest .  Patient states this has been present for a while.  Patient reports the following symptoms: bothersome .  Patient reports the following previous treatments: IL 40 with improvement.  Patient reports the following modifying factors: none. Also treated genital warts at LOV on penis with ln2 with improvement. Still some remaining.  Associated symptoms: none. Patient has no other skin complaints today.  Remainder of the HPI, Meds, PMH, Allergies, FH, and SH was reviewed in chart.    Pertinent Hx:   keloids  Past Medical History:   Diagnosis Date     Hyperlipidemia 3/21/2018       No past surgical history on file.     History reviewed. No pertinent family history.    Social History     Socioeconomic History     Marital status:      Spouse name: Not on file     Number of children: Not on file     Years of education: Not on file     Highest education level: Not on file   Occupational History     Not on file   Tobacco Use     Smoking status: Former Smoker     Smokeless tobacco: Never Used   Substance and Sexual Activity     Alcohol use: Yes     Alcohol/week: 0.0 standard drinks     Comment: 2-3x week     Drug use: Yes     Types: Marijuana     Comment: rarely     Sexual activity: Yes     Partners: Female   Other Topics Concern     Parent/sibling w/ CABG, MI or angioplasty before 65F 55M? Not Asked   Social History Narrative     Not on file     Social Determinants of Health     Financial Resource Strain: Not on file   Food Insecurity: Not on file   Transportation Needs: Not on file   Physical Activity: Not on file   Stress: Not on file   Social Connections: Not on file   Intimate Partner Violence: Not on file   Housing Stability: Not on file       Outpatient Encounter Medications as of 5/16/2022   Medication Sig Dispense Refill     atorvastatin (LIPITOR) 10 MG tablet Take 1 tablet (10 mg) by mouth daily 90 tablet 1     azelastine  (ASTELIN) 0.1 % nasal spray Spray 1 spray into both nostrils 2 times daily 30 mL 5     fluticasone (FLONASE) 50 MCG/ACT nasal spray Spray 2 sprays into both nostrils daily 16 g 10     podofilox (CONDYLOX) 0.5 % external solution Apply to affected area on penis q12 hrs x 3 days, off x 4 days. May repeat qwk x 1-4 weeks. Avoid surrounding skin. 3.5 mL 3     loratadine (CLARITIN) 10 MG tablet Take 1 tablet (10 mg) by mouth daily (Patient not taking: Reported on 5/16/2022) 30 tablet 1     No facility-administered encounter medications on file as of 5/16/2022.       Review Of Systems:  Skin: spots, keloid  Eyes: negative  Ears/Nose/Throat: negative  Respiratory: No shortness of breath, dyspnea on exertion, cough, or hemoptysis  Cardiovascular: negative  Gastrointestinal: negative  Genitourinary: negative  Musculoskeletal: negative  Neurologic: negative  Psychiatric: negative  Hematologic/Lymphatic/Immunologic: negative  Endocrine: negative      Objective:     /69   Pulse 90   SpO2 100%   Eyes: Conjunctivae/lids: Normal   ENT: Lips:  Normal  MSK: Normal  Cardiovascular: Peripheral edema none  Pulm: Breathing Normal  Neuro/Psych: Orientation: A/O x 3. Normal; Mood/Affect: Normal, NAD, WDWN  Pt accompanied by: self  Following areas examined: face, neck, chest, penis, suprapubic  Villafana skin type:v   Findings:  Smooth brown firm papules on mid chest x 3  Flesh-colored verrucous appearing papules on penis x 7and left suprapubic x 1    Assessment and Plan:     1) keloid x 3 on mid chest  IL TAC: PGACAC discussed.  Risks including but not limited to injection site reaction, bruising, no resolution.  All questions answered and entertained to patient s satisfaction.  Informed consent obtained.  IL TAC in concentration of 40mg/ml was injected ID to 3 keloids.  Total injected was  0.8 ml.  Patient tolerated without complications and given wound care instructions, including not to move product around.  Return in 4 weeks  for follow-up and possible additional IL TAC. May need additional treatment. May not be effective.      2) condyloma acuminata x 6  LN2: Treated with LN2 for 5s for 1-2 cycles. Warned risks of blistering, pain, pigment change, scarring, and incomplete resolution.  Advised patient to return if lesions do not completely resolve within 2-3 months.  Wound care sheet given.  Begin podofolix with flares.    Genital warts are caused by the human papillomavirus (HPV) and are spread through direct skin-to-skin contact. It is a sexually transmitted infection that typically appear months to years after exposure. Subtypes 6 and 11 are most commonly responsible for genital warts and Subtypes 16 and 18 can cause cervical cancer.       Reviewed pertinent charts and labs prior to office visit.     It was a pleasure speaking with Sea Solis today.       Follow up in for additional ln2 and IL TAC

## 2022-05-16 NOTE — LETTER
5/16/2022         RE: Sea Solis  [id number] [address] [address]        Dear Colleague,    Thank you for referring your patient, Sea Solis, to the [facility]. Please see a copy of my visit note below.    HPI:  Sea Solis is a 41 year old male patient here today for keloids on chest .  Patient states this has been present for a while.  Patient reports the following symptoms: bothersome .  Patient reports the following previous treatments: IL 40 with improvement.  Patient reports the following modifying factors: none. Also treated genital warts at LOV on penis with ln2 with improvement. Still some remaining.  Associated symptoms: none. Patient has no other skin complaints today.  Remainder of the HPI, Meds, PMH, Allergies, FH, and SH was reviewed in chart.    Pertinent Hx:   keloids  Past Medical History:   Diagnosis Date     Hyperlipidemia 3/21/2018       No past surgical history on file.     History reviewed. No pertinent family history.    Social History     Socioeconomic History     Marital status:      Spouse name: Not on file     Number of children: Not on file     Years of education: Not on file     Highest education level: Not on file   Occupational History     Not on file   Tobacco Use     Smoking status: Former Smoker     Smokeless tobacco: Never Used   Substance and Sexual Activity     Alcohol use: Yes     Alcohol/week: 0.0 standard drinks     Comment: 2-3x week     Drug use: Yes     Types: Marijuana     Comment: rarely     Sexual activity: Yes     Partners: Female   Other Topics Concern     Parent/sibling w/ CABG, MI or angioplasty before 65F 55M? Not Asked   Social History Narrative     Not on file     Social Determinants of Health     Financial Resource Strain: Not on file   Food Insecurity: Not on file   Transportation Needs: Not on file   Physical Activity: Not on file   Stress: Not on file   Social Connections: Not on file    Intimate Partner Violence: Not on file   Housing Stability: Not on file       Outpatient Encounter Medications as of 5/16/2022   Medication Sig Dispense Refill     atorvastatin (LIPITOR) 10 MG tablet Take 1 tablet (10 mg) by mouth daily 90 tablet 1     azelastine (ASTELIN) 0.1 % nasal spray Spray 1 spray into both nostrils 2 times daily 30 mL 5     fluticasone (FLONASE) 50 MCG/ACT nasal spray Spray 2 sprays into both nostrils daily 16 g 10     podofilox (CONDYLOX) 0.5 % external solution Apply to affected area on penis q12 hrs x 3 days, off x 4 days. May repeat qwk x 1-4 weeks. Avoid surrounding skin. 3.5 mL 3     loratadine (CLARITIN) 10 MG tablet Take 1 tablet (10 mg) by mouth daily (Patient not taking: Reported on 5/16/2022) 30 tablet 1     No facility-administered encounter medications on file as of 5/16/2022.       Review Of Systems:  Skin: spots, keloid  Eyes: negative  Ears/Nose/Throat: negative  Respiratory: No shortness of breath, dyspnea on exertion, cough, or hemoptysis  Cardiovascular: negative  Gastrointestinal: negative  Genitourinary: negative  Musculoskeletal: negative  Neurologic: negative  Psychiatric: negative  Hematologic/Lymphatic/Immunologic: negative  Endocrine: negative      Objective:     /69   Pulse 90   SpO2 100%   Eyes: Conjunctivae/lids: Normal   ENT: Lips:  Normal  MSK: Normal  Cardiovascular: Peripheral edema none  Pulm: Breathing Normal  Neuro/Psych: Orientation: A/O x 3. Normal; Mood/Affect: Normal, NAD, WDWN  Pt accompanied by: self  Following areas examined: face, neck, chest, penis, suprapubic  Villafana skin type:v   Findings:  Smooth brown firm papules on mid chest x 3  Flesh-colored verrucous appearing papules on penis x 7and left suprapubic x 1    Assessment and Plan:     1) keloid x 3 on mid chest  IL TAC: PGACAC discussed.  Risks including but not limited to injection site reaction, bruising, no resolution.  All questions answered and entertained to patient s  satisfaction.  Informed consent obtained.  IL TAC in concentration of 40mg/ml was injected ID to 3 keloids.  Total injected was  0.8 ml.  Patient tolerated without complications and given wound care instructions, including not to move product around.  Return in 4 weeks for follow-up and possible additional IL TAC. May need additional treatment. May not be effective.      2) condyloma acuminata x 6  LN2: Treated with LN2 for 5s for 1-2 cycles. Warned risks of blistering, pain, pigment change, scarring, and incomplete resolution.  Advised patient to return if lesions do not completely resolve within 2-3 months.  Wound care sheet given.  Begin podofolix with flares.    Genital warts are caused by the human papillomavirus (HPV) and are spread through direct skin-to-skin contact. It is a sexually transmitted infection that typically appear months to years after exposure. Subtypes 6 and 11 are most commonly responsible for genital warts and Subtypes 16 and 18 can cause cervical cancer.       Reviewed pertinent charts and labs prior to office visit.     It was a pleasure speaking with Sea Solis today.       Follow up in for additional ln2 and IL TAC        Again, thank you for allowing me to participate in the care of your patient.        Sincerely,        Aditi Faustin PA-C

## 2022-05-25 ENCOUNTER — OFFICE VISIT (OUTPATIENT)
Dept: NEUROLOGY | Facility: CLINIC | Age: 42
End: 2022-05-25
Payer: COMMERCIAL

## 2022-05-25 VITALS
HEIGHT: 66 IN | WEIGHT: 164 LBS | SYSTOLIC BLOOD PRESSURE: 126 MMHG | BODY MASS INDEX: 26.36 KG/M2 | HEART RATE: 62 BPM | RESPIRATION RATE: 16 BRPM | DIASTOLIC BLOOD PRESSURE: 88 MMHG

## 2022-05-25 DIAGNOSIS — R41.89 SUBJECTIVE MEMORY COMPLAINTS: Primary | ICD-10-CM

## 2022-05-25 DIAGNOSIS — R41.89 COGNITIVE DECLINE: ICD-10-CM

## 2022-05-25 PROCEDURE — 99214 OFFICE O/P EST MOD 30 MIN: CPT | Performed by: PSYCHIATRY & NEUROLOGY

## 2022-05-25 NOTE — PROGRESS NOTES
NEUROLOGY OUTPATIENT PROGRESS NOTE   May 25, 2022     CHIEF COMPLAINT/REASON FOR VISIT/REASON FOR CONSULT  Patient presents with:  Follow Up: 6 week - review results     REASON FOR CONSULTATION- Memory loss    REFERRAL SOURCE  DrArtur Referred Self  CC  Referred Self    HISTORY OF PRESENT ILLNESS  Sea Solis is a 41 year old male seen today for evaluation of memory problems.  He reports that the memory problems have been going on for the last 4 years.  He remembers that he moved to the [place] in [place].  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  He is originally from [place].  He went to school there.  He remembers doing good in school but does not remember much about his early life in [place].  Memory problems involved both short-term and long-term.  Does have word finding difficulty.  Also has difficulty staying focused on things.  He will forget conversations that he has had with other people.  He will watch a movie with his wife and not remember what he watched.  He is currently driving for living.  Does report that occasionally he will miss the exit and take the wrong exit.  He will go for walks and then forget how to get back home.  Has to use GPS if he is using a bicycle.  Cannot find the bathroom at home.  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  Denies any changes in his personality.  Feels occasionally that there is a present and hears some noises.  No visual hallucinations.  No auditory hallucinations or people to talk to him.  Will wake up multiple times in the middle of the night.  Reports no drug use good does smoke weed occasionally.  No new medications.  No previous history of mental illness.    3/14/22  Patient returns today.  Reports that he continues to have memory issues.  These have not really changed in nature.  No hallucinations.  No difficulty with sleep.  Is eating well.  Did forget to do a lot of his testing which was supposed to be reviewed today.  Did do the EEG  today.  No other new concerns.    5/25/22  Patient returns today.  Continues to have memory issues.  Does occasionally feel depressed but cannot really figure out any other causes for the memory problems.  Multiple family members have the memory problems.  Reports that the symptoms mainly came on 7 years ago when [place] moved to the [place].  No difficulty with sleep.  Is eating well.  Has completed most of the testing though has not been able to do the neuropsychology evaluation.  Denies any other new concerns or any other physical symptoms.    Previous history is reviewed and this is unchanged.    PAST MEDICAL/SURGICAL HISTORY  Past Medical History:   Diagnosis Date     Hyperlipidemia 3/21/2018     Patient Active Problem List   Diagnosis     Latent tuberculosis     Erectile dysfunction, unspecified erectile dysfunction type     Hyperlipidemia     Tension headache     Sleep disturbance     Vitamin D deficiency   Significant memory loss, nervous breakdown, Depression, sleep disorder, high cholesterol    FAMILY HISTORY  History reviewed. No pertinent family history.   Positive for multiple family members including father, uncles and cousins with memory issues.  There is also family history of epilepsy and cousins.  There is family history of migraines and cousins.  Grandparents do have some kind of tremor.    SOCIAL HISTORY  Social History     Tobacco Use     Smoking status: Former Smoker     Smokeless tobacco: Never Used   Substance Use Topics     Alcohol use: Yes     Alcohol/week: 0.0 standard drinks     Comment: 2-3x week     Drug use: Yes     Types: Marijuana     Comment: rarely       SYSTEMS REVIEW  Twelve-system ROS was done and other than the HPI this was negative except for neck pain, back pain, dizziness, hearing loss, sleepiness during the day, sleeping problems, headaches, memory loss, anxiety, depression.  No new concerns reported today.    MEDICATIONS  atorvastatin (LIPITOR) 10 MG tablet, Take 1 tablet (10 mg) by  "mouth daily  azelastine (ASTELIN) 0.1 % nasal spray, Spray 1 spray into both nostrils 2 times daily  fluticasone (FLONASE) 50 MCG/ACT nasal spray, Spray 2 sprays into both nostrils daily  loratadine (CLARITIN) 10 MG tablet, Take 1 tablet (10 mg) by mouth daily  podofilox (CONDYLOX) 0.5 % external solution, Apply to affected area on penis q12 hrs x 3 days, off x 4 days. May repeat qwk x 1-4 weeks. Avoid surrounding skin.    No current facility-administered medications on file prior to visit.       PHYSICAL EXAMINATION  VITALS: /88   Pulse 62   Resp 16   Ht 1.676 m (5' 6\")   Wt 74.4 kg (164 lb)   BMI 26.47 kg/m    GENERAL: Healthy appearing, alert, no acute distress, normal habitus.  CARDIOVASCULAR: Extremities warm and well perfused. Pulses present.   NEUROLOGICAL:  Patient is awake and oriented to self, place and time.  Attention span is normal.  Memory is limited; previously MoCA 8.  Language is fluent and some difficulty follows commands appropriately.  Appropriate fund of knowledge. Cranial nerves 2-12 are intact. There is no pronator drift.  Motor exam shows 5/5 strength in all extremities.  Tone is symmetric bilaterally in upper and lower extremities.  Reflexes are symmetric and 2+ in upper extremities and lower extremities. (Previously sensory exam is grossly intact to light touch, pin prick and vibration.)  Finger to nose and heel to shin is without dysmetria.  Romberg is negative.  Gait is normal and the patient is able to do tandem walk and walk on toes and heels.  Exam overall unchanged compared to before.    DIAGNOSTICS  OUTSIDE RECORDS  Outside referral notes and chart notes were reviewed and pertinent information has been summarized (in addition to the HPI):-Patient having some memory issues.  There are some notes describing some lesions in the genital area.  He has had testing for STD done.  No relevant notes available.  Patient has possibly had some testing for syphilis in the " past.    EEG  IMPRESSION/REPORT/PLAN  This is a normal EEG during wakefulness and drowsiness except for mild generalized background dysrhythmia. Further clinical correlation is needed.     Please note that the absence of epileptiform abnormalities does not exclude the possibility of epilepsy in any patient.     MRI-images reviewed.  No major structural changes noted.  IMPRESSION: Intracranial contents are unremarkable. No finding for intracranial hemorrhage, mass, or acute infarct.    LABS  Component      Latest Ref Rng & Units 3/14/2022   WBC      4.0 - 11.0 10e3/uL 4.5   RBC Count      4.40 - 5.90 10e6/uL 5.29   Hemoglobin      13.3 - 17.7 g/dL 15.6   Hematocrit      40.0 - 53.0 % 47.4   MCV      78 - 100 fL 90   MCH      26.5 - 33.0 pg 29.5   MCHC      31.5 - 36.5 g/dL 32.9   RDW      10.0 - 15.0 % 13.8   Platelet Count      150 - 450 10e3/uL 228   % Neutrophils      % 41   % Lymphocytes      % 48   % Monocytes      % 7   % Eosinophils      % 3   % Basophils      % 1   % Immature Granulocytes      % 0   NRBCs per 100 WBC      <1 /100 0   Absolute Neutrophils      1.6 - 8.3 10e3/uL 1.8   Absolute Lymphocytes      0.8 - 5.3 10e3/uL 2.2   Absolute Monocytes      0.0 - 1.3 10e3/uL 0.3   Absolute Eosinophils      0.0 - 0.7 10e3/uL 0.1   Absolute Basophils      0.0 - 0.2 10e3/uL 0.1   Absolute Immature Granulocytes      <=0.4 10e3/uL 0.0   Absolute NRBCs      10e3/uL 0.0   Sodium      136 - 145 mmol/L 138   Potassium      3.5 - 5.0 mmol/L 4.0   Chloride      98 - 107 mmol/L 104   Carbon Dioxide      22 - 31 mmol/L 26   Anion Gap      5 - 18 mmol/L 8   Urea Nitrogen      8 - 22 mg/dL 8   Creatinine      0.70 - 1.30 mg/dL 0.91   Calcium      8.5 - 10.5 mg/dL 8.7   Glucose      70 - 125 mg/dL 94   Alkaline Phosphatase      45 - 120 U/L 63   AST      0 - 40 U/L 17   ALT      0 - 45 U/L 21   Protein Total      6.0 - 8.0 g/dL 7.7   Albumin      3.5 - 5.0 g/dL 4.3   Bilirubin Total      0.0 - 1.0 mg/dL 0.4   GFR Estimate       >60 mL/min/1.73m2 >90   Interpretive Comments       SEE NOTE   AChR Ganglionic Neuronal Ab, S      <=0.02 nmol/L 0.00   Amphiphysin Ab, S      <1:240 titer Negative   AGNA-1, S      <1:240 titer Negative   JAMIE-1, S      <1:240 titer Negative   JAMIE-2, S      <1:240 titer Negative   JAMIE-3, S      <1:240 titer Negative   CRMP-5-IgG, S      <1:240 titer Negative   Neuronal (V-G) K+ Channel Ab, S      <=0.02 nmol/L 0.00   P/Q-Type Calcium Channel AB      <=0.02 nmol/L 0.00   PCA-1, S      <1:240 titer Negative   PCA-2, S      <1:240 titer Negative   PCA-Tr, S      <1:240 titer Negative   Reflex Added       None.   Neutrophil Cytoplasmic Antibody      <1:10 <1:10   Neutrophil Cytoplasmic Antibody Pattern       The ANCA IFA is <1:10.  No further testing will be performed.   Vitamin B12      213 - 816 pg/mL 462   Vitamin B1 Whole Blood Level      70 - 180 nmol/L 75   TSH      0.30 - 5.00 uIU/mL 1.09   Treponema Antibodies      Nonreactive Nonreactive   Methylmalonic Acid      0.00 - 0.40 umol/L 0.12   Angiotensin Converting Enzyme      9 - 67 U/L 37   MONICA interpretation      Negative Negative   Sed Rate      0 - 15 mm/hr 2   Thyroid Peroxidase Antibody      0 - 6 IU/mL <3   Ammonia      11 - 35 umol/L 24        IMPRESSION/REPORT/PLAN  Subjective memory complaints/cognitive decline    This is a 41 year old male with progressive cognitive difficulty over the last 4 years.  Patient has multiple family members with cognitive issues also has some different family members with epilepsy.  He reports that the cognitive difficulty came on after he moved to the .  MRI brain, EEG, blood work have been noncontributory.  He did score poorly on the Meigs previously.  Unclear if there is a language barrier that affected his inability to do well on the test.  I will set him up with neuropsychology to see if a diagnosis can be made.  Potentially there is a psychiatric issue like depression or attention disorder that might be affecting  his cognition.  Could consider lumbar puncture for autoimmune encephalopathy if no other causes identified.    I can see him back after the neuropsychology evaluation.    -     Adult Neuropsychology Referral; Future    Return in about 1 month (around 6/25/2022) for In-Clinic Visit (must), or next available, After testing.    Over 34 minutes were spent coordinating the care for the patient on the day of the encounter.  This includes previsit, during visit and post visit activities as documented above.  Counseling patient.  Reviewing MRI images.  Reviewing blood work.  (Activities include but not inclusive of reviewing chart, reviewing outside records, reviewing labs and imaging study results as well as the images, patient visit time including getting history and exam,  use if applicable, review of test results with the patient and coming up with a plan in a shared model, counseling patient and family, education and answering patient questions, EMR , EMR diagnosis entry and problem list management, medication reconciliation and prescription management if applicable, paperwork if applicable, printing documents and documentation of the visit activities.)      Andrey Burton MD  Neurologist  [facility]  Tel:- [phone]    This note was dictated using voice recognition software.  Any grammatical or context distortions are unintentional and inherent to the software.

## 2022-05-25 NOTE — NURSING NOTE
"Sea Solis is a 41 year old male who presents for:  Chief Complaint   Patient presents with     Follow Up     6 week - review results         Initial Vitals:  /88   Pulse 62   Resp 16   Ht 1.676 m (5' 6\")   Wt 74.4 kg (164 lb)   BMI 26.47 kg/m   Estimated body mass index is 26.47 kg/m  as calculated from the following:    Height as of this encounter: 1.676 m (5' 6\").    Weight as of this encounter: 74.4 kg (164 lb).. Body surface area is 1.86 meters squared. BP completed using cuff size: wrist cuff  Data Unavailable    Nursing Comments:     Libia Pittman, [facility]    "

## 2022-05-25 NOTE — LETTER
5/25/2022         RE: Sea Solis  [address] [address] [facility] [address]        Dear Colleague,    Thank you for referring your patient, Sea Solis, to the [facility] [place]. Please see a copy of my visit note below.    NEUROLOGY OUTPATIENT PROGRESS NOTE   May 25, 2022     CHIEF COMPLAINT/REASON FOR VISIT/REASON FOR CONSULT  Patient presents with:  Follow Up: 6 week - review results     REASON FOR CONSULTATION- Memory loss    REFERRAL SOURCE  DrArtur Referred Self  CC  Referred Self    HISTORY OF PRESENT ILLNESS  Sea Solis is a 41 year old male seen today for evaluation of memory problems.  He reports that the memory problems have been going on for the last 4 years.  He remembers that he moved to the [place] in [place].  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  He is originally from [place].  He went to school there.  He remembers doing good in school but does not remember much about his early life in [place].  Memory problems involved both short-term and long-term.  Does have word finding difficulty.  Also has difficulty staying focused on things.  He will forget conversations that he has had with other people.  He will watch a movie with his wife and not remember what he watched.  He is currently driving for living.  Does report that occasionally he will miss the exit and take the wrong exit.  He will go for walks and then forget how to get back home.  Has to use GPS if he is using a bicycle.  Cannot find the bathroom at home.  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  Denies any changes in his personality.  Feels occasionally that there is a present and hears some noises.  No visual hallucinations.  No auditory hallucinations or people to talk to him.  Will wake up multiple times in the middle of the night.  Reports no drug use good does smoke weed occasionally.  No new medications.  No previous history of mental illness.    3/14/22  Patient  returns today.  Reports that he continues to have memory issues.  These have not really changed in nature.  No hallucinations.  No difficulty with sleep.  Is eating well.  Did forget to do a lot of his testing which was supposed to be reviewed today.  Did do the EEG today.  No other new concerns.    5/25/22  Patient returns today.  Continues to have memory issues.  Does occasionally feel depressed but cannot really figure out any other causes for the memory problems.  Multiple family members have the memory problems.  Reports that the symptoms mainly came on 7 years ago when he moved to the [place].  No difficulty with sleep.  Is eating well.  Has completed most of the testing though has not been able to do the neuropsychology evaluation.  Denies any other new concerns or any other physical symptoms.    Previous history is reviewed and this is unchanged.    PAST MEDICAL/SURGICAL HISTORY  Past Medical History:   Diagnosis Date     Hyperlipidemia 3/21/2018     Patient Active Problem List   Diagnosis     Latent tuberculosis     Erectile dysfunction, unspecified erectile dysfunction type     Hyperlipidemia     Tension headache     Sleep disturbance     Vitamin D deficiency   Significant memory loss, nervous breakdown, Depression, sleep disorder, high cholesterol    FAMILY HISTORY  History reviewed. No pertinent family history.   Positive for multiple family members including father, uncles and cousins with memory issues.  There is also family history of epilepsy and cousins.  There is family history of migraines and cousins.  Grandparents do have some kind of tremor.    SOCIAL HISTORY  Social History     Tobacco Use     Smoking status: Former Smoker     Smokeless tobacco: Never Used   Substance Use Topics     Alcohol use: Yes     Alcohol/week: 0.0 standard drinks     Comment: 2-3x week     Drug use: Yes     Types: Marijuana     Comment: rarely       SYSTEMS REVIEW  Twelve-system ROS was done and other than the HPI this was  "negative except for neck pain, back pain, dizziness, hearing loss, sleepiness during the day, sleeping problems, headaches, memory loss, anxiety, depression.  No new concerns reported today.    MEDICATIONS  atorvastatin (LIPITOR) 10 MG tablet, Take 1 tablet (10 mg) by mouth daily  azelastine (ASTELIN) 0.1 % nasal spray, Spray 1 spray into both nostrils 2 times daily  fluticasone (FLONASE) 50 MCG/ACT nasal spray, Spray 2 sprays into both nostrils daily  loratadine (CLARITIN) 10 MG tablet, Take 1 tablet (10 mg) by mouth daily  podofilox (CONDYLOX) 0.5 % external solution, Apply to affected area on penis q12 hrs x 3 days, off x 4 days. May repeat qwk x 1-4 weeks. Avoid surrounding skin.    No current facility-administered medications on file prior to visit.       PHYSICAL EXAMINATION  VITALS: /88   Pulse 62   Resp 16   Ht 1.676 m (5' 6\")   Wt 74.4 kg (164 lb)   BMI 26.47 kg/m    GENERAL: Healthy appearing, alert, no acute distress, normal habitus.  CARDIOVASCULAR: Extremities warm and well perfused. Pulses present.   NEUROLOGICAL:  Patient is awake and oriented to self, place and time.  Attention span is normal.  Memory is limited; previously MoCA 8.  Language is fluent and some difficulty follows commands appropriately.  Appropriate fund of knowledge. Cranial nerves 2-12 are intact. There is no pronator drift.  Motor exam shows 5/5 strength in all extremities.  Tone is symmetric bilaterally in upper and lower extremities.  Reflexes are symmetric and 2+ in upper extremities and lower extremities. (Previously sensory exam is grossly intact to light touch, pin prick and vibration.)  Finger to nose and heel to shin is without dysmetria.  Romberg is negative.  Gait is normal and the patient is able to do tandem walk and walk on toes and heels.  Exam overall unchanged compared to before.    DIAGNOSTICS  OUTSIDE RECORDS  Outside referral notes and chart notes were reviewed and pertinent information has been " summarized (in addition to the HPI):-Patient having some memory issues.  There are some notes describing some lesions in the genital area.  He has had testing for STD done.  No relevant notes available.  Patient has possibly had some testing for syphilis in the past.    EEG  IMPRESSION/REPORT/PLAN  This is a normal EEG during wakefulness and drowsiness except for mild generalized background dysrhythmia. Further clinical correlation is needed.     Please note that the absence of epileptiform abnormalities does not exclude the possibility of epilepsy in any patient.     MRI-images reviewed.  No major structural changes noted.  IMPRESSION: Intracranial contents are unremarkable. No finding for intracranial hemorrhage, mass, or acute infarct.    LABS  Component      Latest Ref Rng & Units 3/14/2022   WBC      4.0 - 11.0 10e3/uL 4.5   RBC Count      4.40 - 5.90 10e6/uL 5.29   Hemoglobin      13.3 - 17.7 g/dL 15.6   Hematocrit      40.0 - 53.0 % 47.4   MCV      78 - 100 fL 90   MCH      26.5 - 33.0 pg 29.5   MCHC      31.5 - 36.5 g/dL 32.9   RDW      10.0 - 15.0 % 13.8   Platelet Count      150 - 450 10e3/uL 228   % Neutrophils      % 41   % Lymphocytes      % 48   % Monocytes      % 7   % Eosinophils      % 3   % Basophils      % 1   % Immature Granulocytes      % 0   NRBCs per 100 WBC      <1 /100 0   Absolute Neutrophils      1.6 - 8.3 10e3/uL 1.8   Absolute Lymphocytes      0.8 - 5.3 10e3/uL 2.2   Absolute Monocytes      0.0 - 1.3 10e3/uL 0.3   Absolute Eosinophils      0.0 - 0.7 10e3/uL 0.1   Absolute Basophils      0.0 - 0.2 10e3/uL 0.1   Absolute Immature Granulocytes      <=0.4 10e3/uL 0.0   Absolute NRBCs      10e3/uL 0.0   Sodium      136 - 145 mmol/L 138   Potassium      3.5 - 5.0 mmol/L 4.0   Chloride      98 - 107 mmol/L 104   Carbon Dioxide      22 - 31 mmol/L 26   Anion Gap      5 - 18 mmol/L 8   Urea Nitrogen      8 - 22 mg/dL 8   Creatinine      0.70 - 1.30 mg/dL 0.91   Calcium      8.5 - 10.5 mg/dL 8.7    Glucose      70 - 125 mg/dL 94   Alkaline Phosphatase      45 - 120 U/L 63   AST      0 - 40 U/L 17   ALT      0 - 45 U/L 21   Protein Total      6.0 - 8.0 g/dL 7.7   Albumin      3.5 - 5.0 g/dL 4.3   Bilirubin Total      0.0 - 1.0 mg/dL 0.4   GFR Estimate      >60 mL/min/1.73m2 >90   Interpretive Comments       SEE NOTE   AChR Ganglionic Neuronal Ab, S      <=0.02 nmol/L 0.00   Amphiphysin Ab, S      <1:240 titer Negative   AGNA-1, S      <1:240 titer Negative   JAMIE-1, S      <1:240 titer Negative   JAMIE-2, S      <1:240 titer Negative   JAMIE-3, S      <1:240 titer Negative   CRMP-5-IgG, S      <1:240 titer Negative   Neuronal (V-G) K+ Channel Ab, S      <=0.02 nmol/L 0.00   P/Q-Type Calcium Channel AB      <=0.02 nmol/L 0.00   PCA-1, S      <1:240 titer Negative   PCA-2, S      <1:240 titer Negative   PCA-Tr, S      <1:240 titer Negative   Reflex Added       None.   Neutrophil Cytoplasmic Antibody      <1:10 <1:10   Neutrophil Cytoplasmic Antibody Pattern       The ANCA IFA is <1:10.  No further testing will be performed.   Vitamin B12      213 - 816 pg/mL 462   Vitamin B1 Whole Blood Level      70 - 180 nmol/L 75   TSH      0.30 - 5.00 uIU/mL 1.09   Treponema Antibodies      Nonreactive Nonreactive   Methylmalonic Acid      0.00 - 0.40 umol/L 0.12   Angiotensin Converting Enzyme      9 - 67 U/L 37   MONICA interpretation      Negative Negative   Sed Rate      0 - 15 mm/hr 2   Thyroid Peroxidase Antibody      0 - 6 IU/mL <3   Ammonia      11 - 35 umol/L 24        IMPRESSION/REPORT/PLAN  Subjective memory complaints/cognitive decline    This is a 41 year old male with progressive cognitive difficulty over the last 4 years.  Patient has multiple family members with cognitive issues also has some different family members with epilepsy.  He reports that the cognitive difficulty came on after [place].  MRI brain, EEG, blood work have been noncontributory.  He did score poorly on the [place] previously.  Unclear  if there is a language barrier that affected his inability to do well on the test.  I will set him up with neuropsychology to see if a diagnosis can be made.  Potentially there is a psychiatric issue like depression or attention disorder that might be affecting his cognition.  Could consider lumbar puncture for autoimmune encephalopathy if no other causes identified.    I can see him back after the neuropsychology evaluation.    -     Adult Neuropsychology Referral; Future    Return in about 1 month (around 6/25/2022) for In-Clinic Visit (must), or next available, After testing.    Over 34 minutes were spent coordinating the care for the patient on the day of the encounter.  This includes previsit, during visit and post visit activities as documented above.  Counseling patient.  Reviewing MRI images.  Reviewing blood work.  (Activities include but not inclusive of reviewing chart, reviewing outside records, reviewing labs and imaging study results as well as the images, patient visit time including getting history and exam,  use if applicable, review of test results with the patient and coming up with a plan in a shared model, counseling patient and family, education and answering patient questions, EMR , EMR diagnosis entry and problem list management, medication reconciliation and prescription management if applicable, paperwork if applicable, printing documents and documentation of the visit activities.)      Andrey Burton MD  Neurologist  [facility]  Tel:- [phone]    This note was dictated using voice recognition software.  Any grammatical or context distortions are unintentional and inherent to the software.        Again, thank you for allowing me to participate in the care of your patient.        Sincerely,        Andrey Burton MD

## 2022-06-23 ENCOUNTER — IMMUNIZATION (OUTPATIENT)
Dept: NURSING | Facility: CLINIC | Age: 42
End: 2022-06-23
Attending: FAMILY MEDICINE
Payer: COMMERCIAL

## 2022-06-23 PROCEDURE — 91305 COVID-19,PF,PFIZER (12+ YRS): CPT

## 2022-06-23 PROCEDURE — 0052A COVID-19,PF,PFIZER (12+ YRS): CPT

## 2022-08-18 ENCOUNTER — OFFICE VISIT (OUTPATIENT)
Dept: FAMILY MEDICINE | Facility: CLINIC | Age: 42
End: 2022-08-18
Payer: COMMERCIAL

## 2022-08-18 VITALS
WEIGHT: 163 LBS | DIASTOLIC BLOOD PRESSURE: 75 MMHG | HEART RATE: 76 BPM | HEIGHT: 66 IN | SYSTOLIC BLOOD PRESSURE: 119 MMHG | BODY MASS INDEX: 26.2 KG/M2 | OXYGEN SATURATION: 97 % | TEMPERATURE: 97.7 F

## 2022-08-18 DIAGNOSIS — R09.81 SINUS CONGESTION: Primary | ICD-10-CM

## 2022-08-18 DIAGNOSIS — R51.9 NONINTRACTABLE HEADACHE, UNSPECIFIED CHRONICITY PATTERN, UNSPECIFIED HEADACHE TYPE: ICD-10-CM

## 2022-08-18 DIAGNOSIS — J02.9 SORE THROAT: ICD-10-CM

## 2022-08-18 LAB
DEPRECATED S PYO AG THROAT QL EIA: NEGATIVE
GROUP A STREP BY PCR: NOT DETECTED

## 2022-08-18 PROCEDURE — 87651 STREP A DNA AMP PROBE: CPT | Performed by: FAMILY MEDICINE

## 2022-08-18 PROCEDURE — 99213 OFFICE O/P EST LOW 20 MIN: CPT | Performed by: FAMILY MEDICINE

## 2022-08-18 RX ORDER — CETIRIZINE HYDROCHLORIDE 10 MG/1
10 TABLET ORAL DAILY
Qty: 30 TABLET | Refills: 0 | Status: SHIPPED | OUTPATIENT
Start: 2022-08-18 | End: 2023-04-11

## 2022-08-18 RX ORDER — MELOXICAM 15 MG/1
15 TABLET ORAL DAILY
Qty: 20 TABLET | Refills: 0 | Status: SHIPPED | OUTPATIENT
Start: 2022-08-18 | End: 2023-04-11

## 2022-08-18 RX ORDER — TIZANIDINE 2 MG/1
TABLET ORAL
COMMUNITY
Start: 2022-03-31 | End: 2023-04-11

## 2022-08-18 RX ORDER — IPRATROPIUM BROMIDE 21 UG/1
2 SPRAY, METERED NASAL EVERY 12 HOURS
Qty: 30 ML | Refills: 0 | Status: SHIPPED | OUTPATIENT
Start: 2022-08-18 | End: 2023-04-11

## 2022-08-18 RX ORDER — CYCLOBENZAPRINE HCL 10 MG
10 TABLET ORAL 3 TIMES DAILY PRN
Qty: 30 TABLET | Refills: 0 | Status: SHIPPED | OUTPATIENT
Start: 2022-08-18 | End: 2023-04-11

## 2022-08-18 ASSESSMENT — PAIN SCALES - GENERAL: PAINLEVEL: NO PAIN (0)

## 2022-08-18 NOTE — PATIENT INSTRUCTIONS
Your symptoms could be related to sinus congestion and or post nasal drip   I recommend taking Zyrtec 10 mg daily , and Atrovent spray   In addition to your other spray Flonase and astelin .     The headache could be related to muscle spasm , I recommend taking flexeril  at night

## 2022-08-18 NOTE — PROGRESS NOTES
Assessment & Plan     Sinus congestion    - cetirizine (ZYRTEC) 10 MG tablet; Take 1 tablet (10 mg) by mouth daily  - ipratropium (ATROVENT) 0.03 % nasal spray; Spray 2 sprays into both nostrils every 12 hours    Sore throat    - Streptococcus A Rapid Screen w/Reflex to PCR - Clinic Collect  - Group A Streptococcus PCR Throat Swab    Nonintractable headache, unspecified chronicity pattern, unspecified headache type    - cyclobenzaprine (FLEXERIL) 10 MG tablet; Take 1 tablet (10 mg) by mouth 3 times daily as needed for muscle spasms  - meloxicam (MOBIC) 15 MG tablet; Take 1 tablet (15 mg) by mouth daily    Your symptoms could be related to sinus congestion and or post nasal drip   I recommend taking Zyrtec 10 mg daily , and Atrovent spray   In addition to your other spray Flonase and astelin .     The headache could be related to muscle spasm , I recommend taking flexeril  at night  If no Improvement will refer to ENT  Patient verbalized understanding and agreed on the plan of care. All questions answered.                  Return in about 4 weeks (around 9/15/2022), or if symptoms worsen or fail to improve, for Follow up.    Chelly Whitten MD  [facility] THIAGO Osei is a 42 year old, presenting for the following health issues:  Throat Problem      History of Present Illness       Reason for visit:  Harshness of throat  Symptom onset:  More than a month  Symptoms include:  Harshness throat  Symptom intensity:  Mild  Symptom progression:  Staying the same  Had these symptoms before:  No  What makes it worse:  Especially setting in car driving  What makes it better:  Only feel better when I was four days off from driving    He eats 4 or more servings of fruits and vegetables daily.He consumes 0 sweetened beverage(s) daily.He exercises with enough effort to increase his heart rate 30 to 60 minutes per day.  He exercises with enough effort to increase his heart rate 4 days per week.  "  He is taking medications regularly.     Something irritating the throat mainyl while driving         Review of Systems   Constitutional, HEENT, cardiovascular, pulmonary, gi and gu systems are negative, except as otherwise noted.      Objective    /75 (BP Location: Left arm, Patient Position: Sitting, Cuff Size: Adult Regular)   Pulse 76   Temp 97.7  F (36.5  C) (Tympanic)   Ht 1.676 m (5' 6\")   Wt 73.9 kg (163 lb)   SpO2 97%   BMI 26.31 kg/m    Body mass index is 26.31 kg/m .  Physical Exam   GENERAL: healthy, alert and no distress  NECK: no adenopathy, no asymmetry, masses, or scars and thyroid normal to palpation  RESP: lungs clear to auscultation - no rales, rhonchi or wheezes  CV: regular rate and rhythm, normal S1 S2, no S3 or S4, no murmur, click or rub, no peripheral edema and peripheral pulses strong  ABDOMEN: soft, nontender, no hepatosplenomegaly, no masses and bowel sounds normal  MS: no gross musculoskeletal defects noted, no edema                    .  ..  "

## 2022-09-11 ENCOUNTER — HEALTH MAINTENANCE LETTER (OUTPATIENT)
Age: 42
End: 2022-09-11

## 2022-09-30 ENCOUNTER — OFFICE VISIT (OUTPATIENT)
Dept: DERMATOLOGY | Facility: CLINIC | Age: 42
End: 2022-09-30
Payer: COMMERCIAL

## 2022-09-30 DIAGNOSIS — A63.0 GENITAL WARTS: Primary | ICD-10-CM

## 2022-09-30 DIAGNOSIS — L91.0 KELOID CICATRIX: ICD-10-CM

## 2022-09-30 PROCEDURE — 99213 OFFICE O/P EST LOW 20 MIN: CPT | Mod: 25 | Performed by: PHYSICIAN ASSISTANT

## 2022-09-30 PROCEDURE — 11900 INJECT SKIN LESIONS </W 7: CPT | Performed by: PHYSICIAN ASSISTANT

## 2022-09-30 PROCEDURE — 17110 DESTRUCTION B9 LES UP TO 14: CPT | Mod: 59 | Performed by: PHYSICIAN ASSISTANT

## 2022-09-30 RX ORDER — TRIAMCINOLONE ACETONIDE 40 MG/ML
40 INJECTION, SUSPENSION INTRA-ARTICULAR; INTRAMUSCULAR ONCE
Status: COMPLETED | OUTPATIENT
Start: 2022-09-30 | End: 2022-09-30

## 2022-09-30 RX ORDER — IMIQUIMOD 12.5 MG/.25G
CREAM TOPICAL
Qty: 20 PACKET | Refills: 3 | Status: SHIPPED | OUTPATIENT
Start: 2022-09-30 | End: 2023-04-11

## 2022-09-30 RX ADMIN — TRIAMCINOLONE ACETONIDE 40 MG: 40 INJECTION, SUSPENSION INTRA-ARTICULAR; INTRAMUSCULAR at 13:48

## 2022-09-30 NOTE — PROGRESS NOTES
HPI:   Chief complaints: Sea Solis is a pleasant 42 year old male who presents for recheck of keloids on the chest and genital warts. He has had improvement in the warts but keloids are about the same.       PHYSICAL EXAM:    There were no vitals taken for this visit.  Skin exam performed as follows: Type 6 skin. Mood appropriate  Alert and Oriented X 3. Well developed, well nourished in no distress.  General appearance: Normal  Head including face: Normal  Eyes: conjunctiva and lids: Normal  Mouth: Lips, teeth, gums: Normal  Neck: Normal  Cardiovascular: Exam of peripheral vascular system by observation for swelling, varicosities, edema: Normal  Right upper extremity: Normal  Left upper extremity: Normal  Right lower extremity: Normal  Left lower extremity: Normal  Skin: Scalp and body hair: See below    Firm papule on the chest x 3  Verrucous papule on the left side of penis x 10    ASSESSMENT/PLAN:     1. Keloid cicatrix  --Kenalog 40 mg/cc injected to chest. Total of 0.3 cc's used.  Advised on risk of atrophy and failure to respond. Advised on aftercare.     2. Genital warts Cryosurgery performed. Advised on blistering and post op care.   --Start imiquimod at bedtime as tolerated          Follow-up: PRN  CC:   Scribed By: Mami Umana MS, PALATASHA

## 2023-02-17 ENCOUNTER — OFFICE VISIT (OUTPATIENT)
Dept: NEUROLOGY | Facility: CLINIC | Age: 43
End: 2023-02-17
Payer: COMMERCIAL

## 2023-02-17 DIAGNOSIS — R41.89 SUBJECTIVE MEMORY COMPLAINTS: Primary | ICD-10-CM

## 2023-02-17 DIAGNOSIS — F32.A DEPRESSION, UNSPECIFIED DEPRESSION TYPE: ICD-10-CM

## 2023-02-17 PROCEDURE — 96132 NRPSYC TST EVAL PHYS/QHP 1ST: CPT | Performed by: CLINICAL NEUROPSYCHOLOGIST

## 2023-02-17 PROCEDURE — 96139 PSYCL/NRPSYC TST TECH EA: CPT | Performed by: CLINICAL NEUROPSYCHOLOGIST

## 2023-02-17 PROCEDURE — 96116 NUBHVL XM PHYS/QHP 1ST HR: CPT | Performed by: CLINICAL NEUROPSYCHOLOGIST

## 2023-02-17 PROCEDURE — 96138 PSYCL/NRPSYC TECH 1ST: CPT | Performed by: CLINICAL NEUROPSYCHOLOGIST

## 2023-02-17 PROCEDURE — 96133 NRPSYC TST EVAL PHYS/QHP EA: CPT | Performed by: CLINICAL NEUROPSYCHOLOGIST

## 2023-02-17 NOTE — PROGRESS NOTES
The patient was seen for a neuropsychological evaluation for the purposes of diagnostic clarification and treatment planning. 90 minutes of face-to-face testing were provided by this writer. An additional 15 minutes were spent scoring and compiling test results. The patient was cooperative with testing. No concerns were brought to my attention. Please see Dr. Chin's report for a detailed description of the charges and interpretation and integration of the findings.

## 2023-02-17 NOTE — PROGRESS NOTES
NEUROPSYCHOLOGY CONSULT  [facility]    NAME: Sea Solis    YOB: 1980   AGE: 42  EDU: 16  DATE OF EVALUATION: 2/17/2023    REASON FOR REFERRAL:  Mr. Solis is a 42 year old, right-handed, [place] male presenting with concerns about cognitive functioning in the context of hyperlipidemia and ongoing grief related to multiple losses within the last several years including a daughter in 2021. He was referred for a neurocognitive evaluation by his neurologist, Dr. Burton from [facility] to assist with differential diagnosis and care planning.     Mr. Solis is a native [place] speaker. However, all of his education was conducted in English including a bachelors degree. Thus the following evaluation was conducted in English. He was able to participate in the interview without clear comprehension or expressive language difficulties.     DIAGNOSTIC SUMMARY:  Due to the current COVID-19 pandemic that limits contact during in-person clinical visits, the testing portion of this assessment was conducted using face-to-face methods with PPE worn by the examiner and a face-mask for the patient. The standard administration of these tests involves in-person, direct face-to-face methods. The full impact of applying non-standard administration methods with PPE is not fully appreciated at this time. The diagnostic conclusions and recommendations provided in this report are being advanced with caution.    Of note, current results are also interpreted with caution due to Mr. Solis's non-native English speaking status as well as evidence of suboptimal performance on multiple performance validity measures. With all these limitations in mind, results of testing indicate that Mr. Solis is a gentleman of estimated average premorbid intellectual functioning whose performance is notable for impairments across almost every domain assessed including  basic attention, cognitive efficiency, language, spatial skills, learning, prose memory, and nonverbal memory. Intact performance was evident only on measures of executive functioning (working memory, complex attention, visuospatial planning and organization). On self-report questionnaires, he endorsed moderate symptoms of both depression and anxiety.     I was able to share the above results along with my impressions and recommendations (see below) with Mr. Solis on the day of testing. I provided the opportunity for him to ask questions about the evaluation and results. At the end of our meeting, Mr. Solis indicated that he understood the results and that I had answered all of his questions. He was encouraged to reach out to me (contact information included in the AVS) should any further questions or concerns arise in the future. I explained that I would send the impressions and recommendations from the report as part of the After Visit Summary (which will be directed to clinic staff to print and send by mail) and that Dr. Burton would be receiving the full report as the consulting provider as would his PCP.     Summary for Providers  ASSESSMENT:    Impairments identified in almost every cognitive domain assessed    However, performance was notable for suboptimal scores on multiple performance validity measures, thus test scores cannot be interpreted as they very likely underestimate his true cognitive capabilities     Intact scores were evident only on measures of executive functioning (which are actually the most challenging tasks in the battery) and a measure of rote verbal memory    Presentation not clearly suggestive of acquired cognitive dysfunction but much more suggestive of cognitive inefficiencies secondary to mental health factors (stronger performance on harder/ executive tasks, intact rote verbal memory but impaired prose memory which is typically easier for most people)    He has experienced a  large number of deaths in close friends and relatives (including his daughter!) within the last few years (since 2019) and became very tearful when discussing this    His significant depression associated with these losses (and unresolved guilt and grief) is very likely accounting for cognitive inefficiencies in daily life, rather than any kind of acquired cognitive dysfunction or neurodegenerative process    If mood symptoms are better controlled, he should also experience improvement in his day to day cognitive abilities    Interpretation of test results is limited given his non-native English speaking status (our tasks are designed on native English speaking [place] samples); however, mood factors provide the most parsimonious explanation for his reduced test scores    Diagnosis: No cognitive diagnosis, cognitive inefficiencies secondary to mood    PLAN:    Targeted mental health interventions are strongly recommended. He was open to psychotherapy and a referral has already been placed.     Consider trial of antidepressant medication if not medically contraindicated    Make use of compensatory strategies (see below) to support cognitive functioning until mood symptoms improve    If memory problems or other cognitive concerns persist after mental health symptoms are addressed, repeat testing could be considered    Otherwise, no specific follow-up in Neuropsychology is needed    FEEDBACK:  Mr. Solis received the results of this evaluation on the day of testing.     Thank you for allowing me to participate in Mr. Solis's care.  Please contact me with any questions regarding the content of this report.      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 Solis consider engaging in psychotherapy services to address his mood symptoms.  We have a provider in clinic Dr. Allie Parkinson ([phone]) that might be a good option. He was open to this during feedback and a referral has already been placed.    Mr. Solis is also encouraged to speak with his Neurologist or PCP about a trial of antidepressant medication, if not medically contraindicated, to help manage his mood symptoms.    In the meantime, behavioral activation techniques such as regular exercise (under the guidance of his physician), recreational activities and regular social interaction would likely be effective in helping Mr. Solis to manage his mood.     Given his history of sleep disturbance, and especially difficulties returning to sleep due to disruptive thoughts, Mr. Solis is encouraged to speak with his therapist about cognitive strategies to help disrupt this ruminative thinking pattern and other more healthy techniques (rather than alcohol or marijuana) to return to sleep.    It is important that Mr. Solis continue to adhere to his medication treatment regimen and follow a healthy diet so as to maintain his physical health, as this can have a significant impact on his physical, emotional, and cognitive functioning.     Under the guidance of his physician, It is recommended that regular exercise be integrated into Mr. Solis's routine as it will likely benefit him cognitively and emotionally as well as physically.   Memory and Organization    Mr. Solis demonstrated variable memory deficits on testing. Such difficulties are more likely to appear when he is especially tired/ fatigued, in pain or  struggling with mood symptoms. In those situations, he will benefit from the following strategies.    In his daily life, Mr. Solis may find it helpful to post reminder notes around the house, make lists, and carry a small calendar so that he can feel more comfortable and confident in his ability to remember information. A daily planner could also be used as a memory book where important information is recorded and organized for future reference.     In addition, Mr. Solis may find it helpful to set up a file system that helps him to prioritize and organize tasks. For example, separate files could be created for different types of information (i.e., medical, financial, recreational) and set up in such a way so that high priority tasks or information is located at the front of the file.     Mr. Solis could also create a system to establish set locations for certain items (i.e., keys) such that he always knows where to put them upon entering the house and where to look when he needs them. If he would like to keep certain items out of sight (i.e., a wallet), he could set up a specific hidden place to keep items and use that same place so as to ensure he can find the required item when needed.     When required to learn new information, Mr. Solis showed good benefit from repetition. Thus it is recommended that information be broken down into small units and repeated to facilitate his learning.  Follow-up    If memory or other thinking concerns persist after Mr. Marquezs depression has been better controlled, repeat testing could be considered. Otherwise, no specific follow-up with Neuropsychology is recommended at this time.      --------------------------------EXTENDED REPORT--------------------------------  Verbal consent for neuropsychological testing was received following the provision of information about the nature of the evaluation, and the opportunity to ask questions.     HISTORY OF PRESENTING  "PROBLEM:    Relevant History  Mr. Solis was initially seen in Neurology by Dr. Burton on 1/21/22. At that time, he earned a MoCA score of 8/30 (0/5 memory). During the most recent visit with Dr. Burton on 5/25/22 \"This is a 41 year old male with progressive cognitive difficulty over the last 4 years.  Patient has multiple family members with cognitive issues also has some different family members with epilepsy.  He reports that the cognitive difficulty came on after [place] to the [place].  MRI brain, EEG, blood work have been noncontributory.  He did score poorly on the [place] previously.  Unclear if there is a language barrier that affected his inability to do well on the test.  I will set him up with neuropsychology to see if a diagnosis can be made.  Potentially there is a psychiatric issue like depression or attention disorder that might be affecting his cognition.  Could consider lumbar puncture for autoimmune encephalopathy if no other causes identified.\"    Current Interview  Mr. Solis presented on his own and was an adequate historian. He was able to provide the following information.     At the present time, Mr. Solis reports that he has been struggling with forgetfulness since well before [place] came [place] but feels like it became worse after he came here.  He described difficulties with remembering conversations, misplacing items, and forgetting intended tasks.  He described difficulties with attention and focus as well as word finding (both in his native language as well as in English).  He continues to speak both of these languages regularly but denied any comprehension difficulties.  He described how sometimes he \"mistakes events for events\" but had difficulty articulating what this meant.  He went on to say that \"sometimes in my head I try and remember an incident and I will be thinking but then it brings up something else.\"      With some prompting, Mr. Solis described how his " 16-year-old daughter  of a seizure in  (in [place] while he was here in the [place]) and it is often thoughts about her that intrude into his day-to-day life and cause distraction and lack of focus.  He later described experiencing the loss of many friends and relatives since 2019, including his wife's brother who he was close to, his mother-in-law's boyfriend who committed suicide, his cousin who he thought of as a sister, as well as a different cousin.  Further, he added that his father-in-law is struggling with dementia and is in assisted living after stroke and an uncle (who was like a father to his children and is in [place]) is currently struggling with significant illness and requires dialysis.  Finally, Mr. Solis shared that his son recently ran away from home for 11 days and they actually thought he had been kidnapped (as this is not unusual for the region), but later found out that he had run away and they found him at a [employer].  He has been having a lot of behavioral issues since the death of his sister.      With regard to the activities of daily living, Mr. Solis reported that  he shares cooking responsibilities with his wife and while he has never had difficulties with burning food, he does often forget ingredients.  He stated that his wife manages most of the bills but that she does often have to remind him to pay the bills that he is responsible for.  He has medications that he takes regularly for cholesterol but sometimes forgets them and his wife reminds him.  He continues to drive with no recent tickets, accidents, or incidents of becoming lost and actually described driving as a very pleasant activity.  He noted that when he drives, it takes his mind off things (and particularly his daughter) so he enjoys driving.    MEDICAL HISTORY:  Mr. Solis's medical history is significant for   Past Medical History:   Diagnosis Date     Hyperlipidemia 3/21/2018     Mr. Solis's current  "problem list includes   Patient Active Problem List   Diagnosis     Latent tuberculosis     Erectile dysfunction, unspecified erectile dysfunction type     Hyperlipidemia     Tension headache     Sleep disturbance     Vitamin D deficiency     Mr. Solis denied any history of stroke.  He did report experiencing a seizure as a small child that he believes was associated with a time when he was sick.  He also described a history of head injury with loss of consciousness at some point when he was in primary school.  He indicated that he was playing with his cousin when he fell onto the concrete and was knocked unconscious.  He does not know how long.  He had no further information about this event.    Mr. Solis denied any significant sensory changes or disturbance in appetite.  He did indicate that he wakes up \"too many times,\" at night.  He stated that he often wakes and thinks of his daughter and is then unable to return to sleep.  He states that he often has to have a beer or smoke marijuana to help him fall back asleep.  He stated that he did not snore in the past but his wife has told him that he does snore now.  He apparently also talks in his sleep.  He stated that he is usually tired in the morning and it takes him a lot of energy to get up.    Diagnostic studies:  An MRI of the brain from 3/23/2022 revealed:                                           IMPRESSION: Intracranial contents are unremarkable. No finding for intracranial hemorrhage, mass, or acute infarct.    No past surgical history on file.    Current medications include (per medical record):   Current Outpatient Medications:      atorvastatin (LIPITOR) 10 MG tablet, TAKE 1 TABLET(10 MG) BY MOUTH DAILY, Disp: 90 tablet, Rfl: 0     azelastine (ASTELIN) 0.1 % nasal spray, Spray 1 spray into both nostrils 2 times daily (Patient not taking: No sig reported), Disp: 30 mL, Rfl: 5     cetirizine (ZYRTEC) 10 MG tablet, Take 1 tablet (10 mg) by mouth daily, " "Disp: 30 tablet, Rfl: 0     cyclobenzaprine (FLEXERIL) 10 MG tablet, Take 1 tablet (10 mg) by mouth 3 times daily as needed for muscle spasms, Disp: 30 tablet, Rfl: 0     fluticasone (FLONASE) 50 MCG/ACT nasal spray, Spray 2 sprays into both nostrils daily (Patient not taking: No sig reported), Disp: 16 g, Rfl: 10     imiquimod (ALDARA) 5 % external cream, Apply to AA at bedtime as tolerated, Disp: 20 packet, Rfl: 3     ipratropium (ATROVENT) 0.03 % nasal spray, Spray 2 sprays into both nostrils every 12 hours, Disp: 30 mL, Rfl: 0     loratadine (CLARITIN) 10 MG tablet, Take 1 tablet (10 mg) by mouth daily, Disp: 30 tablet, Rfl: 1     meloxicam (MOBIC) 15 MG tablet, Take 1 tablet (15 mg) by mouth daily, Disp: 20 tablet, Rfl: 0     podofilox (CONDYLOX) 0.5 % external solution, Apply to affected area on penis q12 hrs x 3 days, off x 4 days. May repeat qwk x 1-4 weeks. Avoid surrounding skin. (Patient not taking: No sig reported), Disp: 3.5 mL, Rfl: 3     tiZANidine (ZANAFLEX) 2 MG tablet, TAKE 1 TABLET BY MOUTH AT BEDTIME AS NEEDED FOR MUSCLE SPASM. MAY CAUSE DROWSINESS (Patient not taking: No sig reported), Disp: , Rfl: .    FAMILY HISTORY:   Family neurological history is significant for stroke in an uncle and cousin, seizures in his daughter and a cousin, and dementia in an uncle and grandmother.    PSYCHIATRIC AND SUBSTANCE USE HISTORY:  Mr. Solis described his current mood as \"up-and-down,\" indicating that he often feels sad and down.  He stated that he often experiences feelings of disappointment and feels like a failure related to his daughter as he was not there for her when she  nor was he there in [place] to perform the rights associated with death.  \"At nighttime that haunts me,\" and he feels \"devastated,\" that he could not do the rights.  He finds it helpful to go and drive as it takes his mind off things. Mr. Solis did acknowledge some passive suicidal thoughts but denied any active plan or " "intent.  He stated that he did attempt psychotherapy after the death of his daughter but \"I did not complete it,\" (never went back after the first session) noting that he thought he could handle things on his own.  He added that his wife was not happy that he did not continue with the process.  He otherwise denied any formal psychiatric history or treatment.    With regard to substance use, Mr. Solis denied any history of problematic alcohol, tobacco or recreational drug use.  He did state, however, that he generally has an alcoholic beverage, usually beer, at night when he wakes up and cannot return to sleep.  He also smokes marijuana several times a week to help with sleep.  He stated that he used to smoke cigarettes but stopped about 10 years ago.    SOCIAL HISTORY:  Mr. Solis was born and raised in [place].  He stated that [place] was his first language but he learned English in school and all of his schooling was conducted in English.  He also later learned [place].  He was unaware of any issues with his mother's pregnancy with him, or in his birth but indicated that he was a \"sickly child\" and, as noted above, did have a seizure at 1 point while he was ill.  He was unaware of whether he met developmental milestones on time.  When he first started school, he described difficulties learning to read and write noting that he did not really learn these skills until his fourth or fifth year of school.  However, he was able to learn math.  He stated that he ultimately completed a bachelor's degree in estate management (real estate) but noted that it was not easy for him and he often had to repeat classes.    Mr. 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  He stated this was his last job before coming to the [place].  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  However, he stated that he was experiencing attention lapses and often made mistakes and left this job in 2020 after about 2 to 3 years of work.  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    Mr. Solis stated that he was previously  and his ex-wife is in [place] with his 12-year-old son.  He has been with his current wife since approximately 2017.    BEHAVIORAL OBSERVATIONS:   Mr. Solis arrived 15 minutes late and unaccompanied to today's appointment. He was appropriately dressed and groomed. He was alert and engaged during the interview. Gait was unremarkable. His mood ws described as \"up and down\" but his affective presentation was mostly flat. However, he did become tearful when discussing the death of a cousin whom he thought of as a sister. Rapport was established and eye contact was unremarkable. He was pleasant and cooperative. Rate, prosody, and content of speech were grossly normal. No significant word finding difficulties or paraphasic errors were evident. He did speak with an accent. There was no evidence of a uziel thought disorder; no hallucinations or delusions were apparent. Judgment and insight appeared fair.       Mr. Solis was rather reserved throughout testing. He appeared adequately motivated and engaged easily in the testing component of the evaluation. However, he performed well below expectation on multiple performance validity measures.  He attempted all tasks presented to him and worked at a steady pace.  He did not appear overly frustrated by difficult or challenging tasks and made no attempt to correct errors.     LIMITATIONS:  Due to circumstances that limit contact during in-person clinical visits, this assessment was conducted using face-to-face testing with the examiner wearing [facility] designated PPE and the patient wearing a face mask. The standard " administration of these procedures involves in-person, face-to-face methods without PPE. The impact of applying non-standard administration methods has been evaluated only in part by scientific research. While every effort was made to simulate standard assessment practices, the diagnostic conclusions and recommendations for treatment provided in this report are being advanced with these limitations in mind.    It should be noted that the current tasks were designed and normed on native English speaking [place] American samples. Thus it is somewhat difficult to generalize the findings to non-native English speakers who are less familiar with Western culture. Given these cultural factors as well as evidence of suboptimal performance on multiple PVTs, the following results are interpreted with extreme caution as they likely underestimate Mr. Solis's true cognitive capabilities.     TESTS ADMINISTERED:   Performance Validity Tests, Generalized Anxiety Disorder-7 (NAVDEEP-7), Patient Health Questionnaire (PHQ-9), Repeatable Battery for the Assessment of Neuropsychological Status Form B (RBANS), Color Trails Test (CTT), WAIS-IV Digit Span, WRAT-5 Word Reading (blue), and WMS-III Information and Orientation.    (Raw scores in parentheses)    DESCRIPTIVE PERFORMANCE KEY:    Labels for tests with Normal Distributions  Score Label Standard Score %ile Rank   Exceptionally high score  > 130 > 98   Above average score 120-129 91-97   High average score 110-119 75-90   Average score  25-74   Low average score 80-89 9-24   Below average score 70-79 2-8   Exceptionally low score < 70 < 2     Labels for tests with Non-Normal Distributions  Score Label %ile Rank   Within normal expectations/ limits score (WNL) > 24   Low average score 9-24   Below average score 2-8   Exceptionally low score < 2     The following test results utilize score labels as adapted from Ryan Rosen, Guido Maria, Mimi Campbell, JEROME Collins  "Lila Sparks, Juan Luis, Idris Villeda & Conference Participants (2020): American Academy of Clinical Neuropsychology consensus conference statement on uniform labeling of performance test scores, The Clinical Neuropsychologist, DOI: 10.1080/[email or web address]    All scores contain some measure of error; scores are reported here as they are obtained by the individual (without reference to the range of error). These are meant as labels and not interpretation of performance. While other relevant comments regarding task performance are provided below, please see the Assessment, Impressions and Diagnostic Summary sections of this report for interpretation of the scores and the cognitive profile as a whole, including what does and does not constitute impairment.    OPTIMAL PREMORBID INTELLECT:  Optimal premorbid intellectual abilities were estimated as falling in the average range based on Mr. Solis's educational and occupational histories.    SUMMARY OF TEST RESULTS:     Orientation, Attention and Processing Speed  Mental status exam was measured as a below average score for his age (11). He was oriented to person, and date and was able to correctly name the current and previous presidents. He did not know the name of the clinic, guessed we were in [place] (actually [place]), and was 50 minutes off when estimating the time.     Performance on a measure of basic attention and working memory was assessed as a low average score (20). This reflected a low average score for basic attention skills (LDF = 6) and working memory scores that ranged from a low average score (LDB = 3) to an average score (LDS = 6).    A simple sequencing task (80\") was assessed as a low average score. A digit symbol substitution task (19) was assessed as an exceptionally low score.     Language  Sight word reading skills (37) were assessed as an exceptionally low score. His performance on a measure of semantic " "fluency was measured as a low average score (13). Confrontation naming was measured as an exceptionally low score (6/10).    Visuospatial Skills  Performance on a visuospatial judgement task resulted in an exceptionally low score (6).     Learning and Memory  He was administered a measure of rote auditory verbal list learning that required him to learn a series of 10 words over trials and retain and recall them over a long delay. His initial rate of learning (3,3,5,7) resulted in an exceptionally low score, and was below expectation given basic attention skills. He retained and recalled 4 words for a a low average score for delayed recall performance.  Recognition memory was an exceptionally low score (17/20). Contextual auditory verbal learning abilities were measured as an exceptionally low score as he learned 3 and 6 details over two trials. After a delay, he retained and recalled 4 details earning an exceptionally low score for delayed recall performance. Delayed nonverbal memory for a complex figure was assessed as a below average score (8).     Executive Functioning  Working memory ranged from a low average score (LDB = 3) to an average score (LDS = 6). A complex sequencing and set shifting task was measured as an average score (64\"). This task was completed without error. His copy of a complex figure was performed as an average score for his age and notable only for mild inattention to detail (17).     Mood  On the Patient Health Questionnaire-9, a self report measure of depressive symptomatology, he obtained a score of 11, placing him in the range of moderate depression. He endorsed passive suicidal ideation without intent.    On the Generalized Anxiety Disorder-7, a self-report measure of anxiety, he obtained a score of 10, placing him in the range of moderate anxiety.     EVALUATION SERVICES AND TIME:   A clinical interview/neurobehavioral status examination was conducted with the patient and documented. I " thoroughly reviewed the medical record, selected the neuropsychological test battery, provided supervision to the individual who administered and scored the neuropsychological test battery, interpreted/integrated patient data and test results, engaged in clinical decision making, treatment planning, report writing/preparation and provided and documented interactive feedback of test results on the day of testing. A trained examiner/technician directly administered and scored 2+ neuropsychological tests. Please see below for a breakdown of time spent and the associated codes billed for these services.     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 Solis has a history of hyperlipidemia and ongoing grief related to multiple losses within the last several years including a daughter in 2021 and was referred for an evaluation of Subjective Memory Complaints. Feedback of results was provided on the day of testing.       Emi Chin, PhD, LP, ABPP  Clinical Neuropsychologist, [id number]  Board Certified in Clinical Neuropsychology    [facility] [address], [address]  [place] [address]  Phone:  [phone]

## 2023-02-17 NOTE — LETTER
2/17/2023         RE: Sea Solis  [address] [facility] [address]        Dear Colleague,    Thank you for referring your patient, Sea Solis, to the [facility]. Please see a copy of my visit note below.    NEUROPSYCHOLOGY CONSULT  [facility]    NAME: Sea Solis    YOB: 1980   AGE: 42  EDU: 16  DATE OF EVALUATION: 2/17/2023    REASON FOR REFERRAL:  Mr. Solis is a 42 year old, right-handed, [place] male presenting with concerns about cognitive functioning in the context of hyperlipidemia and ongoing grief related to multiple losses within the last several years including a daughter in 2021. He was referred for a neurocognitive evaluation by his neurologist, Dr. Burton from [facility] to assist with differential diagnosis and care planning.     Mr. Solis is a native [place] speaker. However, all of his education was conducted in English including a bachelors degree. Thus the following evaluation was conducted in English. He was able to participate in the interview without clear comprehension or expressive language difficulties.     DIAGNOSTIC SUMMARY:  Due to the current COVID-19 pandemic that limits contact during in-person clinical visits, the testing portion of this assessment was conducted using face-to-face methods with PPE worn by the examiner and a face-mask for the patient. The standard administration of these tests involves in-person, direct face-to-face methods. The full impact of applying non-standard administration methods with PPE is not fully appreciated at this time. The diagnostic conclusions and recommendations provided in this report are being advanced with caution.    Of note, current results are also interpreted with caution due to Mr. Solis's non-native English speaking status as well as evidence of suboptimal performance on multiple  performance validity measures. With all these limitations in mind, results of testing indicate that Mr. Solis is a gentleman of estimated average premorbid intellectual functioning whose performance is notable for impairments across almost every domain assessed including basic attention, cognitive efficiency, language, spatial skills, learning, prose memory, and nonverbal memory. Intact performance was evident only on measures of executive functioning (working memory, complex attention, visuospatial planning and organization). On self-report questionnaires, he endorsed moderate symptoms of both depression and anxiety.     I was able to share the above results along with my impressions and recommendations (see below) with Mr. Solis on the day of testing. I provided the opportunity for him to ask questions about the evaluation and results. At the end of our meeting, Mr. Solis indicated that he understood the results and that I had answered all of his questions. He was encouraged to reach out to me (contact information included in the AVS) should any further questions or concerns arise in the future. I explained that I would send the impressions and recommendations from the report as part of the After Visit Summary (which will be directed to clinic staff to print and send by mail) and that Dr. Burton would be receiving the full report as the consulting provider as would his PCP.     Summary for Providers  ASSESSMENT:    Impairments identified in almost every cognitive domain assessed    However, performance was notable for suboptimal scores on multiple performance validity measures, thus test scores cannot be interpreted as they very likely underestimate his true cognitive capabilities     Intact scores were evident only on measures of executive functioning (which are actually the most challenging tasks in the battery) and a measure of rote verbal memory    Presentation not clearly suggestive of acquired  cognitive dysfunction but much more suggestive of cognitive inefficiencies secondary to mental health factors (stronger performance on harder/ executive tasks, intact rote verbal memory but impaired prose memory which is typically easier for most people)    He has experienced a large number of deaths in close friends and relatives (including his daughter!) within the last few years (since 2019) and became very tearful when discussing this    His significant depression associated with these losses (and unresolved guilt and grief) is very likely accounting for cognitive inefficiencies in daily life, rather than any kind of acquired cognitive dysfunction or neurodegenerative process    If mood symptoms are better controlled, he should also experience improvement in his day to day cognitive abilities    Interpretation of test results is limited given his non-native English speaking status (our tasks are designed on native English speaking [place] American samples); however, mood factors provide the most parsimonious explanation for his reduced test scores    Diagnosis: No cognitive diagnosis, cognitive inefficiencies secondary to mood    PLAN:    Targeted mental health interventions are strongly recommended. He was open to psychotherapy and a referral has already been placed.     Consider trial of antidepressant medication if not medically contraindicated    Make use of compensatory strategies (see below) to support cognitive functioning until mood symptoms improve    If memory problems or other cognitive concerns persist after mental health symptoms are addressed, repeat testing could be considered    Otherwise, no specific follow-up in Neuropsychology is needed    FEEDBACK:  Mr. Solis received the results of this evaluation on the day of testing.     Thank you for allowing me to participate in Mr. Solis's care.  Please contact me with any questions regarding the content of this report.      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 Solis consider engaging in psychotherapy services to address his mood symptoms.  We have a provider in clinic Dr. Allie Parkinson ([phone]) that might be a good option. He was open to this during feedback and a referral has already been placed.    Mr. Solis is also encouraged to speak with his Neurologist or PCP about a trial of antidepressant medication, if not medically contraindicated, to help manage his mood symptoms.    In the meantime, behavioral activation techniques such as regular exercise (under the guidance of his physician), recreational activities and regular social interaction would likely be effective in helping Mr. Solis to manage his mood.     Given his history of sleep disturbance, and especially difficulties returning to sleep due to disruptive thoughts, Mr. Solis is encouraged to speak with his therapist about cognitive strategies to help disrupt this ruminative thinking pattern and other more healthy techniques (rather than alcohol or marijuana) to return to sleep.    It is important that Mr. Solis continue to adhere to his medication treatment regimen and follow a healthy diet so as to maintain his physical health, as this can have a significant impact on his physical, emotional, and cognitive functioning.     Under the guidance of his physician, It is recommended that regular exercise be integrated into  Mr. Solis's routine as it will likely benefit him cognitively and emotionally as well as physically.   Memory and Organization    Mr. Solis demonstrated variable memory deficits on testing. Such difficulties are more likely to appear when he is especially tired/ fatigued, in pain or struggling with mood symptoms. In those situations, he will benefit from the following strategies.    In his daily life, Mr. Solis may find it helpful to post reminder notes around the house, make lists, and carry a small calendar so that he can feel more comfortable and confident in his ability to remember information. A daily planner could also be used as a memory book where important information is recorded and organized for future reference.     In addition, Mr. Solis may find it helpful to set up a file system that helps him to prioritize and organize tasks. For example, separate files could be created for different types of information (i.e., medical, financial, recreational) and set up in such a way so that high priority tasks or information is located at the front of the file.     Mr. Solis could also create a system to establish set locations for certain items (i.e., keys) such that he always knows where to put them upon entering the house and where to look when he needs them. If he would like to keep certain items out of sight (i.e., a wallet), he could set up a specific hidden place to keep items and use that same place so as to ensure he can find the required item when needed.     When required to learn new information, Mr. Solis showed good benefit from repetition. Thus it is recommended that information be broken down into small units and repeated to facilitate his learning.  Follow-up    If memory or other thinking concerns persist after Mr. Marquezs depression has been better controlled, repeat testing could be considered. Otherwise, no specific follow-up with Neuropsychology is recommended at this time.   "    --------------------------------EXTENDED REPORT--------------------------------  Verbal consent for neuropsychological testing was received following the provision of information about the nature of the evaluation, and the opportunity to ask questions.     HISTORY OF PRESENTING PROBLEM:    Relevant History  Mr. Solis was initially seen in Neurology by Dr. Burton on 1/21/22. At that time, he earned a MoCA score of 8/30 (0/5 memory). During the most recent visit with Dr. Burton on 5/25/22 \"This is a 41 year old male with progressive cognitive difficulty over the last 4 years.  Patient has multiple family members with cognitive issues also has some different family members with epilepsy.  He reports that the cognitive difficulty came on after [place] to the [place].  MRI brain, EEG, blood work have been noncontributory.  He did score poorly on the Valmeyer previously.  Unclear if there is a language barrier that affected his inability to do well on the test.  I will set him up with neuropsychology to see if a diagnosis can be made.  Potentially there is a psychiatric issue like depression or attention disorder that might be affecting his cognition.  Could consider lumbar puncture for autoimmune encephalopathy if no other causes identified.\"    Current Interview  Mr. Solis presented on his own and was an adequate historian. He was able to provide the following information.     At the present time, Mr. Solis reports that he has been struggling with forgetfulness since well before [place] but feels like it became worse after he came here.  He described difficulties with remembering conversations, misplacing items, and forgetting intended tasks.  He described difficulties with attention and focus as well as word finding (both in his native language as well as in English).  He continues to speak both of these languages regularly but denied any comprehension difficulties.  He described how " "sometimes he \"mistakes events for events\" but had difficulty articulating what this meant.  He went on to say that \"sometimes in my head I try and remember an incident and I will be thinking but then it brings up something else.\"      With some prompting, Mr. Solis described how his 16-year-old daughter  of a seizure in  (in [place] while he was here in the [place]) and it is often thoughts about her that intrude into his day-to-day life and cause distraction and lack of focus.  He later described experiencing the loss of many friends and relatives since , including his wife's brother who he was close to, his mother-in-law's boyfriend who committed suicide, his cousin who he thought of as a sister, as well as a different cousin.  Further, he added that his father-in-law is struggling with dementia and is in assisted living after stroke and an uncle (who was like a father to his children and is in [place]) is currently struggling with significant illness and requires dialysis.  Finally, Mr. Solis shared that his son recently ran away from home for 11 days and they actually thought he had been kidnapped (as this is not unusual for the region), but later found out that he had run away and they found him at a [employer].  He has been having a lot of behavioral issues since the death of his sister.      With regard to the activities of daily living, Mr. Solis reported that  he shares cooking responsibilities with his wife and while he has never had difficulties with burning food, he does often forget ingredients.  He stated that his wife manages most of the bills but that she does often have to remind him to pay the bills that he is responsible for.  He has medications that he takes regularly for cholesterol but sometimes forgets them and his wife reminds him.  He continues to drive with no recent tickets, accidents, or incidents of becoming lost and actually described driving as a very pleasant " "activity.  He noted that when he drives, it takes his mind off things (and particularly his daughter) so he enjoys driving.    MEDICAL HISTORY:  Mr. Solis's medical history is significant for   Past Medical History:   Diagnosis Date     Hyperlipidemia 3/21/2018     Mr. Solis's current problem list includes   Patient Active Problem List   Diagnosis     Latent tuberculosis     Erectile dysfunction, unspecified erectile dysfunction type     Hyperlipidemia     Tension headache     Sleep disturbance     Vitamin D deficiency     Mr. Solis denied any history of stroke.  He did report experiencing a seizure as a small child that he believes was associated with a time when he was sick.  He also described a history of head injury with loss of consciousness at some point when he was in primary school.  He indicated that he was playing with his cousin when he fell onto the concrete and was knocked unconscious.  He does not know how long.  He had no further information about this event.    Mr. Solis denied any significant sensory changes or disturbance in appetite.  He did indicate that he wakes up \"too many times,\" at night.  He stated that he often wakes and thinks of his daughter and is then unable to return to sleep.  He states that he often has to have a beer or smoke marijuana to help him fall back asleep.  He stated that he did not snore in the past but his wife has told him that he does snore now.  He apparently also talks in his sleep.  He stated that he is usually tired in the morning and it takes him a lot of energy to get up.    Diagnostic studies:  An MRI of the brain from 3/23/2022 revealed:                                           IMPRESSION: Intracranial contents are unremarkable. No finding for intracranial hemorrhage, mass, or acute infarct.    No past surgical history on file.    Current medications include (per medical record):   Current Outpatient Medications:      atorvastatin (LIPITOR) 10 MG " "tablet, TAKE 1 TABLET(10 MG) BY MOUTH DAILY, Disp: 90 tablet, Rfl: 0     azelastine (ASTELIN) 0.1 % nasal spray, Spray 1 spray into both nostrils 2 times daily (Patient not taking: No sig reported), Disp: 30 mL, Rfl: 5     cetirizine (ZYRTEC) 10 MG tablet, Take 1 tablet (10 mg) by mouth daily, Disp: 30 tablet, Rfl: 0     cyclobenzaprine (FLEXERIL) 10 MG tablet, Take 1 tablet (10 mg) by mouth 3 times daily as needed for muscle spasms, Disp: 30 tablet, Rfl: 0     fluticasone (FLONASE) 50 MCG/ACT nasal spray, Spray 2 sprays into both nostrils daily (Patient not taking: No sig reported), Disp: 16 g, Rfl: 10     imiquimod (ALDARA) 5 % external cream, Apply to AA at bedtime as tolerated, Disp: 20 packet, Rfl: 3     ipratropium (ATROVENT) 0.03 % nasal spray, Spray 2 sprays into both nostrils every 12 hours, Disp: 30 mL, Rfl: 0     loratadine (CLARITIN) 10 MG tablet, Take 1 tablet (10 mg) by mouth daily, Disp: 30 tablet, Rfl: 1     meloxicam (MOBIC) 15 MG tablet, Take 1 tablet (15 mg) by mouth daily, Disp: 20 tablet, Rfl: 0     podofilox (CONDYLOX) 0.5 % external solution, Apply to affected area on penis q12 hrs x 3 days, off x 4 days. May repeat qwk x 1-4 weeks. Avoid surrounding skin. (Patient not taking: No sig reported), Disp: 3.5 mL, Rfl: 3     tiZANidine (ZANAFLEX) 2 MG tablet, TAKE 1 TABLET BY MOUTH AT BEDTIME AS NEEDED FOR MUSCLE SPASM. MAY CAUSE DROWSINESS (Patient not taking: No sig reported), Disp: , Rfl: .    FAMILY HISTORY:   Family neurological history is significant for stroke in an uncle and cousin, seizures in his daughter and a cousin, and dementia in an uncle and grandmother.    PSYCHIATRIC AND SUBSTANCE USE HISTORY:  Mr. Solis described his current mood as \"up-and-down,\" indicating that he often feels sad and down.  He stated that he often experiences feelings of disappointment and feels like a failure related to his daughter as he was not there for her when she  nor was he there in [place] to " "perform the rights associated with death.  \"At nighttime that haunts me,\" and he feels \"devastated,\" that he could not do the rights.  He finds it helpful to go and drive as it takes his mind off things. Mr. Solis did acknowledge some passive suicidal thoughts but denied any active plan or intent.  He stated that he did attempt psychotherapy after the death of his daughter but \"I did not complete it,\" (never went back after the first session) noting that he thought he could handle things on his own.  He added that his wife was not happy that he did not continue with the process.  He otherwise denied any formal psychiatric history or treatment.    With regard to substance use, Mr. Solis denied any history of problematic alcohol, tobacco or recreational drug use.  He did state, however, that he generally has an alcoholic beverage, usually beer, at night when he wakes up and cannot return to sleep.  He also smokes marijuana several times a week to help with sleep.  He stated that he used to smoke cigarettes but stopped about 10 years ago.    SOCIAL HISTORY:  Mr. Solis was born and raised in [facility].  He stated that [place] was his first language but he learned English in school and all of his schooling was conducted in English.  He also later learned Icelandic.  He was unaware of any issues with his mother's pregnancy with him, or in his birth but indicated that he was a \"sickly child\" and, as noted above, did have a seizure at 1 point while he was ill.  He was unaware of whether he met developmental milestones on time.  When he first started school, he described difficulties learning to read and write noting that he did not really learn these skills until his fourth or fifth year of school.  However, he was able to learn math.  He stated that he ultimately completed a bachelor's degree in estate management (real estate) but noted that it was not easy for him and he often had to repeat classes.    Mr. 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  He stated this was his last job before coming to the [place].  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  However, he stated that he was experiencing attention lapses and often made mistakes and left this job in 2020 after about 2 to 3 years of work.  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    Mr. Solis stated that he was previously  and his ex-wife is in [place] with his 12-year-old son.  He has been with his current wife since approximately 2017.    BEHAVIORAL OBSERVATIONS:   Mr. Solis arrived 15 minutes late and unaccompanied to today's appointment. He was appropriately dressed and groomed. He was alert and engaged during the interview. Gait was unremarkable. His mood ws described as \"up and down\" but his affective presentation was mostly flat. However, he did become tearful when discussing the death of a cousin whom he thought of as a sister. Rapport was established and eye contact was unremarkable. He was pleasant and cooperative. Rate, prosody, and content of speech were grossly normal. No significant word finding difficulties or paraphasic errors were evident. He did speak with an accent. There was no evidence of a uziel thought disorder; no hallucinations or delusions were apparent. Judgment and insight appeared fair.       Mr. Solis was rather reserved throughout testing. He appeared adequately motivated and engaged easily in the testing component of the evaluation. However, he performed well below expectation on multiple performance validity measures.  He attempted all tasks presented to him and worked at a steady pace.  He did not appear overly frustrated by difficult or challenging tasks and made no " attempt to correct errors.     LIMITATIONS:  Due to circumstances that limit contact during in-person clinical visits, this assessment was conducted using face-to-face testing with the examiner wearing [employer] designated PPE and the patient wearing a face mask. The standard administration of these procedures involves in-person, face-to-face methods without PPE. The impact of applying non-standard administration methods has been evaluated only in part by scientific research. While every effort was made to simulate standard assessment practices, the diagnostic conclusions and recommendations for treatment provided in this report are being advanced with these limitations in mind.    It should be noted that the current tasks were designed and normed on native English speaking North American samples. Thus it is somewhat difficult to generalize the findings to non-native English speakers who are less familiar with Western culture. Given these cultural factors as well as evidence of suboptimal performance on multiple PVTs, the following results are interpreted with extreme caution as they likely underestimate Mr. Solis's true cognitive capabilities.     TESTS ADMINISTERED:   Performance Validity Tests, Generalized Anxiety Disorder-7 (NAVDEEP-7), Patient Health Questionnaire (PHQ-9), Repeatable Battery for the Assessment of Neuropsychological Status Form B (RBANS), Color Trails Test (CTT), WAIS-IV Digit Span, WRAT-5 Word Reading (blue), and WMS-III Information and Orientation.    (Raw scores in parentheses)    DESCRIPTIVE PERFORMANCE KEY:    Labels for tests with Normal Distributions  Score Label Standard Score %ile Rank   Exceptionally high score  > 130 > 98   Above average score 120-129 91-97   High average score 110-119 75-90   Average score  25-74   Low average score 80-89 9-24   Below average score 70-79 2-8   Exceptionally low score < 70 < 2     Labels for tests with Non-Normal Distributions  Score Label  %ile Rank   Within normal expectations/ limits score (WNL) > 24   Low average score 9-24   Below average score 2-8   Exceptionally low score < 2     The following test results utilize score labels as adapted from Ryan Rosen, Guido Maria, Mimi Campbell, JEROME Collins, Lila Nunez, Juan Luis, Idris Villeda & Conference Participants (2020): American Academy of Clinical Neuropsychology consensus conference statement on uniform labeling of performance test scores, The Clinical Neuropsychologist, DOI: [email or web address]    All scores contain some measure of error; scores are reported here as they are obtained by the individual (without reference to the range of error). These are meant as labels and not interpretation of performance. While other relevant comments regarding task performance are provided below, please see the Assessment, Impressions and Diagnostic Summary sections of this report for interpretation of the scores and the cognitive profile as a whole, including what does and does not constitute impairment.    OPTIMAL PREMORBID INTELLECT:  Optimal premorbid intellectual abilities were estimated as falling in the average range based on Mr. Solis's educational and occupational histories.    SUMMARY OF TEST RESULTS:     Orientation, Attention and Processing Speed  Mental status exam was measured as a below average score for his age (11). He was oriented to person, and date and was able to correctly name the current and previous presidents. He did not know the name of the clinic, guessed we were in [place] (actually [place]), and was 50 minutes off when estimating the time.     Performance on a measure of basic attention and working memory was assessed as a low average score (20). This reflected a low average score for basic attention skills (LDF = 6) and working memory scores that ranged from a low average score (LDB = 3) to an average score (LDS =  "6).    A simple sequencing task (80\") was assessed as a low average score. A digit symbol substitution task (19) was assessed as an exceptionally low score.     Language  Sight word reading skills (37) were assessed as an exceptionally low score. His performance on a measure of semantic fluency was measured as a low average score (13). Confrontation naming was measured as an exceptionally low score (6/10).    Visuospatial Skills  Performance on a visuospatial judgement task resulted in an exceptionally low score (6).     Learning and Memory  He was administered a measure of rote auditory verbal list learning that required him to learn a series of 10 words over trials and retain and recall them over a long delay. His initial rate of learning (3,3,5,7) resulted in an exceptionally low score, and was below expectation given basic attention skills. He retained and recalled 4 words for a a low average score for delayed recall performance.  Recognition memory was an exceptionally low score (17/20). Contextual auditory verbal learning abilities were measured as an exceptionally low score as he learned 3 and 6 details over two trials. After a delay, he retained and recalled 4 details earning an exceptionally low score for delayed recall performance. Delayed nonverbal memory for a complex figure was assessed as a below average score (8).     Executive Functioning  Working memory ranged from a low average score (LDB = 3) to an average score (LDS = 6). A complex sequencing and set shifting task was measured as an average score (64\"). This task was completed without error. His copy of a complex figure was performed as an average score for his age and notable only for mild inattention to detail (17).     Mood  On the Patient Health Questionnaire-9, a self report measure of depressive symptomatology, he obtained a score of 11, placing him in the range of moderate depression. He endorsed passive suicidal ideation without " intent.    On the Generalized Anxiety Disorder-7, a self-report measure of anxiety, he obtained a score of 10, placing him in the range of moderate anxiety.     EVALUATION SERVICES AND TIME:   A clinical interview/neurobehavioral status examination was conducted with the patient and documented. I thoroughly reviewed the medical record, selected the neuropsychological test battery, provided supervision to the individual who administered and scored the neuropsychological test battery, interpreted/integrated patient data and test results, engaged in clinical decision making, treatment planning, report writing/preparation and provided and documented interactive feedback of test results on the day of testing. A trained examiner/technician directly administered and scored 2+ neuropsychological tests. Please see below for a breakdown of time spent and the associated codes billed for these services.     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 Solis has a history of hyperlipidemia and ongoing grief related to multiple losses within the last several years including a daughter in 2021 and was referred for an evaluation of Subjective Memory Complaints. Feedback of results was provided on the day of testing.       Emi Chin, PhD, LP, ABPP  Clinical  Neuropsychologist, NEHEMIAH#[id number]  Board Certified in Clinical Neuropsychology    [facility] Neurology [address] , [address]  [place] [address]  Phone:  [phone]      The patient was seen for a neuropsychological evaluation for the purposes of diagnostic clarification and treatment planning. 90 minutes of face-to-face testing were provided by this writer. An additional 15 minutes were spent scoring and compiling test results. The patient was cooperative with testing. No concerns were brought to my attention. Please see Dr. Chin's report for a detailed description of the charges and interpretation and integration of the findings.      Again, thank you for allowing me to participate in the care of your patient.        Sincerely,        Emi Chin, PhD LP

## 2023-02-21 NOTE — PATIENT INSTRUCTIONS
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 Solis consider engaging in psychotherapy services to address his mood symptoms.  We have a provider in clinic Dr. Allie Parkinson ([phone]) that might be a good option. He was open to this during feedback and a referral has already been placed.  Mr. Solis is also encouraged to speak with his Neurologist or PCP about a trial of antidepressant medication, if not medically contraindicated, to help manage his mood symptoms.  In the meantime, behavioral activation techniques such as regular exercise (under the guidance of his physician), recreational activities and regular social interaction would likely be effective in helping Mr. Solis to manage his mood.   Given his history of sleep disturbance, and especially difficulties returning to sleep due to disruptive thoughts, Mr. Solis is encouraged to speak with his therapist about cognitive strategies to help disrupt this ruminative thinking pattern and other more healthy techniques (rather than alcohol or marijuana) to return to sleep.  It is important that Mr. Solis continue to adhere to his medication treatment regimen and follow a healthy diet so as to maintain his physical health, as this can have a significant impact on his physical, emotional, and cognitive functioning.   Under the guidance of his physician, It is recommended that regular exercise be integrated into Mr. Solis's routine as  it will likely benefit him cognitively and emotionally as well as physically.   Memory and Organization  Mr. Solis demonstrated variable memory deficits on testing. Such difficulties are more likely to appear when he is especially tired/ fatigued, in pain or struggling with mood symptoms. In those situations, he will benefit from the following strategies.  In his daily life, Mr. Solis may find it helpful to post reminder notes around the house, make lists, and carry a small calendar so that he can feel more comfortable and confident in his ability to remember information. A daily planner could also be used as a memory book where important information is recorded and organized for future reference.   In addition, Mr. Solis may find it helpful to set up a file system that helps him to prioritize and organize tasks. For example, separate files could be created for different types of information (i.e., medical, financial, recreational) and set up in such a way so that high priority tasks or information is located at the front of the file.   Mr. Solis could also create a system to establish set locations for certain items (i.e., keys) such that he always knows where to put them upon entering the house and where to look when he needs them. If he would like to keep certain items out of sight (i.e., a wallet), he could set up a specific hidden place to keep items and use that same place so as to ensure he can find the required item when needed.   When required to learn new information, Mr. Solis showed good benefit from repetition. Thus it is recommended that information be broken down into small units and repeated to facilitate his learning.  Follow-up  If memory or other thinking concerns persist after Mr. Solis's depression has been better controlled, repeat testing could be considered. Otherwise, no specific follow-up with Neuropsychology is recommended at this time.

## 2023-04-11 ENCOUNTER — ANCILLARY PROCEDURE (OUTPATIENT)
Dept: GENERAL RADIOLOGY | Facility: CLINIC | Age: 43
End: 2023-04-11
Attending: PHYSICIAN ASSISTANT
Payer: COMMERCIAL

## 2023-04-11 ENCOUNTER — OFFICE VISIT (OUTPATIENT)
Dept: FAMILY MEDICINE | Facility: CLINIC | Age: 43
End: 2023-04-11
Payer: COMMERCIAL

## 2023-04-11 VITALS
WEIGHT: 163.8 LBS | DIASTOLIC BLOOD PRESSURE: 67 MMHG | OXYGEN SATURATION: 98 % | TEMPERATURE: 97.4 F | HEART RATE: 70 BPM | HEIGHT: 66 IN | SYSTOLIC BLOOD PRESSURE: 103 MMHG | BODY MASS INDEX: 26.33 KG/M2 | RESPIRATION RATE: 16 BRPM

## 2023-04-11 DIAGNOSIS — R07.9 CHEST PAIN, UNSPECIFIED TYPE: ICD-10-CM

## 2023-04-11 DIAGNOSIS — R07.9 CHEST PAIN, UNSPECIFIED TYPE: Primary | ICD-10-CM

## 2023-04-11 DIAGNOSIS — K21.00 GASTROESOPHAGEAL REFLUX DISEASE WITH ESOPHAGITIS WITHOUT HEMORRHAGE: ICD-10-CM

## 2023-04-11 DIAGNOSIS — R73.09 ELEVATED GLUCOSE: ICD-10-CM

## 2023-04-11 DIAGNOSIS — E78.5 HYPERLIPIDEMIA, UNSPECIFIED HYPERLIPIDEMIA TYPE: ICD-10-CM

## 2023-04-11 DIAGNOSIS — E55.9 VITAMIN D DEFICIENCY: ICD-10-CM

## 2023-04-11 DIAGNOSIS — F32.A DEPRESSION, UNSPECIFIED DEPRESSION TYPE: ICD-10-CM

## 2023-04-11 LAB
BASOPHILS # BLD AUTO: 0.1 10E3/UL (ref 0–0.2)
BASOPHILS NFR BLD AUTO: 1 %
EOSINOPHIL # BLD AUTO: 0.1 10E3/UL (ref 0–0.7)
EOSINOPHIL NFR BLD AUTO: 3 %
ERYTHROCYTE [DISTWIDTH] IN BLOOD BY AUTOMATED COUNT: 13.8 % (ref 10–15)
HCT VFR BLD AUTO: 43.8 % (ref 40–53)
HGB BLD-MCNC: 14.6 G/DL (ref 13.3–17.7)
IMM GRANULOCYTES # BLD: 0 10E3/UL
IMM GRANULOCYTES NFR BLD: 0 %
LYMPHOCYTES # BLD AUTO: 1.6 10E3/UL (ref 0.8–5.3)
LYMPHOCYTES NFR BLD AUTO: 44 %
MCH RBC QN AUTO: 29.3 PG (ref 26.5–33)
MCHC RBC AUTO-ENTMCNC: 33.3 G/DL (ref 31.5–36.5)
MCV RBC AUTO: 88 FL (ref 78–100)
MONOCYTES # BLD AUTO: 0.3 10E3/UL (ref 0–1.3)
MONOCYTES NFR BLD AUTO: 7 %
NEUTROPHILS # BLD AUTO: 1.6 10E3/UL (ref 1.6–8.3)
NEUTROPHILS NFR BLD AUTO: 44 %
PLATELET # BLD AUTO: 190 10E3/UL (ref 150–450)
RBC # BLD AUTO: 4.99 10E6/UL (ref 4.4–5.9)
WBC # BLD AUTO: 3.5 10E3/UL (ref 4–11)

## 2023-04-11 PROCEDURE — 82306 VITAMIN D 25 HYDROXY: CPT | Performed by: PHYSICIAN ASSISTANT

## 2023-04-11 PROCEDURE — 93000 ELECTROCARDIOGRAM COMPLETE: CPT | Performed by: PHYSICIAN ASSISTANT

## 2023-04-11 PROCEDURE — 99214 OFFICE O/P EST MOD 30 MIN: CPT | Performed by: PHYSICIAN ASSISTANT

## 2023-04-11 PROCEDURE — 36415 COLL VENOUS BLD VENIPUNCTURE: CPT | Performed by: PHYSICIAN ASSISTANT

## 2023-04-11 PROCEDURE — 80061 LIPID PANEL: CPT | Performed by: PHYSICIAN ASSISTANT

## 2023-04-11 PROCEDURE — 71046 X-RAY EXAM CHEST 2 VIEWS: CPT | Mod: TC | Performed by: RADIOLOGY

## 2023-04-11 PROCEDURE — 85025 COMPLETE CBC W/AUTO DIFF WBC: CPT | Performed by: PHYSICIAN ASSISTANT

## 2023-04-11 PROCEDURE — 80053 COMPREHEN METABOLIC PANEL: CPT | Performed by: PHYSICIAN ASSISTANT

## 2023-04-11 PROCEDURE — 83036 HEMOGLOBIN GLYCOSYLATED A1C: CPT | Performed by: PHYSICIAN ASSISTANT

## 2023-04-11 RX ORDER — ATORVASTATIN CALCIUM 10 MG/1
10 TABLET, FILM COATED ORAL DAILY
Qty: 90 TABLET | Refills: 0 | Status: SHIPPED | OUTPATIENT
Start: 2023-04-11 | End: 2023-07-26

## 2023-04-11 RX ORDER — FAMOTIDINE 40 MG/1
40 TABLET, FILM COATED ORAL DAILY
Qty: 30 TABLET | Refills: 0 | Status: SHIPPED | OUTPATIENT
Start: 2023-04-11

## 2023-04-11 ASSESSMENT — ENCOUNTER SYMPTOMS
COUGH: 0
WEAKNESS: 1
MYALGIAS: 0
NERVOUS/ANXIOUS: 1
FEVER: 0
EYE PAIN: 0
DIZZINESS: 0
PALPITATIONS: 0
HEMATOCHEZIA: 0
CONSTIPATION: 0
SHORTNESS OF BREATH: 0
HEMATURIA: 0
HEARTBURN: 0
NAUSEA: 0
PARESTHESIAS: 0
JOINT SWELLING: 0
DYSURIA: 0
ABDOMINAL PAIN: 0
SORE THROAT: 0
HEADACHES: 1
FREQUENCY: 0
DIARRHEA: 0
CHILLS: 0
ARTHRALGIAS: 1

## 2023-04-11 ASSESSMENT — PAIN SCALES - GENERAL: PAINLEVEL: MILD PAIN (3)

## 2023-04-11 NOTE — PROGRESS NOTES
Assessment & Plan  benign exam,  Sternal, often nonexertional symptoms c/w GERD in low risk patient with atypical for CAD presentation.  Patient would like to defer tx for mental health issues until next appt.    Problem List Items Addressed This Visit     Hyperlipidemia    Relevant Medications    atorvastatin (LIPITOR) 10 MG tablet    Other Relevant Orders    Lipid Profile    Comprehensive metabolic panel    Depression, unspecified depression type   Other Visit Diagnoses     Chest pain, unspecified type    -  Primary    Relevant Orders    EKG 12-lead complete w/read - Clinics (Completed)    CBC with Platelets & Differential    XR Chest 2 Views    Helicobacter pylori Antigen Stool    Gastroesophageal reflux disease with esophagitis without hemorrhage        Relevant Medications    famotidine (PEPCID) 40 MG tablet           30 minutes spent by me on the date of the encounter doing chart review, history and exam, documentation and further activities per the note  {   ADELA Mendez  [facility]    Saloni Osei is a 42 year old, presenting for the following health issues:    Chest Pain (Chest pain almost two months now. Pain comes and goes. )    Random, can happen at rest or exertion,  Feels sternal; burning,  Eating doesn't seem to affect it.  Drinks 2-6 bevs a week, no  Known fam hx CAD<  Quit smoking 7 years ago, was a light smoker for 10 years., has been taking his statin    The ASCVD Risk score (Amherst DK, et al., 2019) failed to calculate for the following reasons:    The valid total cholesterol range is 130 to 320 mg/dL           View : No data to display.              Healthy Habits:     Getting at least 3 servings of Calcium per day:  Yes    Bi-annual eye exam:  NO    Dental care twice a year:  NO    Sleep apnea or symptoms of sleep apnea:  None    Diet:  Breakfast skipped    Frequency of exercise:  None    Taking medications regularly:  Yes    Medication  "side effects:  None    PHQ-2 Total Score: 1    Additional concerns today:  Yes     Patient recently had neuropsych testing for subjective memory loss.  executive function was quite high. They advised tx for depression.      Review of Systems   Constitutional: Negative for chills and fever.   HENT: Negative for congestion, ear pain, hearing loss and sore throat.    Eyes: Negative for pain and visual disturbance.   Respiratory: Negative for cough and shortness of breath.    Cardiovascular: Positive for chest pain. Negative for palpitations and peripheral edema.   Gastrointestinal: Negative for abdominal pain, constipation, diarrhea, heartburn, hematochezia and nausea.   Genitourinary: Negative for dysuria, frequency, genital sores, hematuria, impotence, penile discharge and urgency.   Musculoskeletal: Positive for arthralgias. Negative for joint swelling and myalgias.   Neurological: Positive for weakness and headaches. Negative for dizziness and paresthesias.   Psychiatric/Behavioral: Negative for mood changes. The patient is nervous/anxious.            Objective    /67   Pulse 70   Temp 97.4  F (36.3  C) (Tympanic)   Resp 16   Ht 1.676 m (5' 6\")   Wt 74.3 kg (163 lb 12.8 oz)   SpO2 98%   BMI 26.44 kg/m    Body mass index is 26.44 kg/m .  Physical Exam   GENERAL: healthy, alert and no distress  RESP: lungs clear to auscultation - no rales, rhonchi or wheezes  CV: regular rate and rhythm, normal S1 S2, no S3 or S4, no murmur, click or rub, no peripheral edema and peripheral pulses strong  ABDOMEN: soft, nontender, no hepatosplenomegaly, no masses and bowel sounds normal    EKG - Reviewed and interpreted by me appears normal, NSR, normal axis, normal intervals, no acute ST/T changes c/w ischemia, no LVH by voltage criteria, there are no prior tracings available            "

## 2023-04-12 LAB
ALBUMIN SERPL-MCNC: 4 G/DL (ref 3.4–5)
ALP SERPL-CCNC: 64 U/L (ref 40–150)
ALT SERPL W P-5'-P-CCNC: 27 U/L (ref 0–70)
ANION GAP SERPL CALCULATED.3IONS-SCNC: 4 MMOL/L (ref 3–14)
AST SERPL W P-5'-P-CCNC: 16 U/L (ref 0–45)
BILIRUB SERPL-MCNC: 0.4 MG/DL (ref 0.2–1.3)
BUN SERPL-MCNC: 11 MG/DL (ref 7–30)
CALCIUM SERPL-MCNC: 9.1 MG/DL (ref 8.5–10.1)
CHLORIDE BLD-SCNC: 103 MMOL/L (ref 94–109)
CHOLEST SERPL-MCNC: 281 MG/DL
CO2 SERPL-SCNC: 29 MMOL/L (ref 20–32)
CREAT SERPL-MCNC: 1.03 MG/DL (ref 0.66–1.25)
FASTING STATUS PATIENT QL REPORTED: YES
GFR SERPL CREATININE-BSD FRML MDRD: >90 ML/MIN/1.73M2
GLUCOSE BLD-MCNC: 100 MG/DL (ref 70–99)
HBA1C MFR BLD: 5.6 % (ref 0–5.6)
HDLC SERPL-MCNC: 59 MG/DL
LDLC SERPL CALC-MCNC: 203 MG/DL
NONHDLC SERPL-MCNC: 222 MG/DL
POTASSIUM BLD-SCNC: 4 MMOL/L (ref 3.4–5.3)
PROT SERPL-MCNC: 7.5 G/DL (ref 6.8–8.8)
SODIUM SERPL-SCNC: 136 MMOL/L (ref 133–144)
TRIGL SERPL-MCNC: 97 MG/DL

## 2023-04-12 PROCEDURE — 87338 HPYLORI STOOL AG IA: CPT | Performed by: PHYSICIAN ASSISTANT

## 2023-04-13 ENCOUNTER — TELEPHONE (OUTPATIENT)
Dept: FAMILY MEDICINE | Facility: CLINIC | Age: 43
End: 2023-04-13
Payer: COMMERCIAL

## 2023-04-13 DIAGNOSIS — A04.8 H. PYLORI INFECTION: Primary | ICD-10-CM

## 2023-04-13 LAB
DEPRECATED CALCIDIOL+CALCIFEROL SERPL-MC: 8 UG/L (ref 20–75)
H PYLORI AG STL QL IA: POSITIVE

## 2023-04-13 RX ORDER — CLARITHROMYCIN 500 MG
500 TABLET ORAL 2 TIMES DAILY
Qty: 28 TABLET | Refills: 0 | Status: SHIPPED | OUTPATIENT
Start: 2023-04-13 | End: 2023-04-27

## 2023-04-13 RX ORDER — AMOXICILLIN 500 MG/1
1000 CAPSULE ORAL 2 TIMES DAILY
Qty: 56 CAPSULE | Refills: 0 | Status: SHIPPED | OUTPATIENT
Start: 2023-04-13 | End: 2023-04-27

## 2023-04-13 NOTE — PROGRESS NOTES
The 10-year ASCVD risk score (Mini GUERIN, et al., 2019) is: 1.5%    Values used to calculate the score:      Age: 42 years      Sex: Male      Is Non- : No      Diabetic: No      Tobacco smoker: No      Systolic Blood Pressure: 103 mmHg      Is BP treated: No      HDL Cholesterol: 59 mg/dL      Total Cholesterol: 281 mg/dL

## 2023-04-17 ENCOUNTER — TELEPHONE (OUTPATIENT)
Dept: FAMILY MEDICINE | Facility: CLINIC | Age: 43
End: 2023-04-17
Payer: COMMERCIAL

## 2023-04-17 DIAGNOSIS — E55.9 VITAMIN D DEFICIENCY: Primary | ICD-10-CM

## 2023-04-17 RX ORDER — ERGOCALCIFEROL 1.25 MG/1
CAPSULE ORAL
Qty: 20 CAPSULE | Refills: 0 | Status: SHIPPED | OUTPATIENT
Start: 2023-04-17

## 2023-05-03 NOTE — PROGRESS NOTES
[facility] Dermatology Note  Encounter Date: May 8, 2023  Office Visit     Dermatology Problem List:  1. Keloids, chest s/p ILK  2. Condyloma  - s/p cryotherapy 3/16/2022, 5/26/2022, 9/30/2022  - Current tx: imiquimod 5% cream  ____________________________________________    Assessment & Plan:    # Keloid, central chest x3, L upper chest x1  - ILK today; see procedure note below.    # Condyloma x10 - scrotum  - edu on etiology, spread via contact  - avoid sexual activity if/when wart lesions are present  - Start imiquimod 5% cream 3x weekly at night.- has worked well for him in the past.     Procedures Performed:   - Intra-lesional triamcinolone procedure note. After verbal consent and review of risk of pain and skin thinning/atrophy, positioning and cleansing with isopropyl alcohol, 0.2 total mL of triamcinolone 40 mg/mL was injected into 4 lesion(s) on the central chest x3, L upper chest x1. The patient tolerated the procedure well and left the dermatology clinic in good condition.    Follow-up: prn for new or changing lesions    Staff and Scribe:     Scribe Disclosure:  MALIA, Popeye Doan, am serving as a scribe to document services personally performed by Sena Chung PA-C based on data collection and the provider's statements to me.   Provider Disclosure:   The documentation recorded by the scribe accurately reflects the services I personally performed and the decisions made by me.    All risks, benefits and alternatives were discussed with patient.  Patient is in agreement and understands the assessment and plan.  All questions were answered.    Sena Chung PA-C, [facility]  [facility] [place]: Phone: [phone], Fax: [phone]  [facility]: Phone: [phone],  Fax: [phone]  [facility]: Phone: [phone], Fax: [phone]    ____________________________________________    CC:  Keloid (Followu up keloids on chest) and Wart (Also discuss genital warts)    HPI:  Mr. Sea Solis is a(n) 42 year old male who presents today as a return patient for keloids and condyloma. Last seen in dermatology by Mami Umana PA-C on 9/30/2022, at which time patient received ILK for treatment of keloids and cryotherapy for treatment of condyloma.    Today, patient endorses a new keloid on his chest present for 5-6 months. It has flattened in the past, but always raises again. He also would like to address his genital warts, which he has a history of.     Patient is otherwise feeling well, without additional skin concerns.    Labs Reviewed:  N/A    Physical Exam:  Vitals: There were no vitals taken for this visit.  SKIN: Focused examination of the chest and genitals was performed.  - 2-3 mm verrucous papules on the genitals x10.  - Indurated hypertrophic papules on the central chest x3, L upper chest x1.  - No other lesions of concern on areas examined.     Medications:  Current Outpatient Medications   Medication     atorvastatin (LIPITOR) 10 MG tablet     ergocalciferol (ERGOCALCIFEROL) 1.25 MG (32439 UT) capsule     famotidine (PEPCID) 40 MG tablet     No current facility-administered medications for this visit.      Past Medical History:   Patient Active Problem List   Diagnosis     Latent tuberculosis     Erectile dysfunction, unspecified erectile dysfunction type     Hyperlipidemia     Tension headache     Sleep disturbance     Vitamin D deficiency     Depression, unspecified depression type     Past Medical History:   Diagnosis Date     Hyperlipidemia 3/21/2018

## 2023-05-08 ENCOUNTER — OFFICE VISIT (OUTPATIENT)
Dept: FAMILY MEDICINE | Facility: CLINIC | Age: 43
End: 2023-05-08
Payer: COMMERCIAL

## 2023-05-08 DIAGNOSIS — L91.0 KELOID SCAR: Primary | ICD-10-CM

## 2023-05-08 DIAGNOSIS — A63.0 CONDYLOMA: ICD-10-CM

## 2023-05-08 PROCEDURE — 99213 OFFICE O/P EST LOW 20 MIN: CPT | Mod: 25 | Performed by: PHYSICIAN ASSISTANT

## 2023-05-08 PROCEDURE — 11900 INJECT SKIN LESIONS </W 7: CPT | Performed by: PHYSICIAN ASSISTANT

## 2023-05-08 RX ORDER — IMIQUIMOD 12.5 MG/.25G
CREAM TOPICAL
Qty: 12 PACKET | Refills: 3 | Status: SHIPPED | OUTPATIENT
Start: 2023-05-08

## 2023-05-08 RX ADMIN — TRIAMCINOLONE ACETONIDE 8 MG: 40 INJECTION, SUSPENSION INTRA-ARTICULAR; INTRAMUSCULAR at 03:30

## 2023-05-08 ASSESSMENT — PAIN SCALES - GENERAL: PAINLEVEL: NO PAIN (0)

## 2023-05-08 NOTE — LETTER
5/8/2023         RE: Sea Solis  [address]   [place] [address]        Dear Colleague,    Thank you for referring your patient, Sea Solis, to the [facility]. Please see a copy of my visit note below.    [facility] Dermatology Note  Encounter Date: May 8, 2023  Office Visit     Dermatology Problem List:  1. Keloids, chest s/p ILK  2. Condyloma  - s/p cryotherapy 3/16/2022, 5/26/2022, 9/30/2022  - Current tx: imiquimod 5% cream  ____________________________________________    Assessment & Plan:    # Keloid, central chest x3, L upper chest x1  - ILK today; see procedure note below.    # Condyloma x10 - scrotum  - edu on etiology, spread via contact  - avoid sexual activity if/when wart lesions are present  - Start imiquimod 5% cream 3x weekly at night.- has worked well for him in the past.     Procedures Performed:   - Intra-lesional triamcinolone procedure note. After verbal consent and review of risk of pain and skin thinning/atrophy, positioning and cleansing with isopropyl alcohol, 0.2 total mL of triamcinolone 40 mg/mL was injected into 4 lesion(s) on the central chest x3, L upper chest x1. The patient tolerated the procedure well and left the dermatology clinic in good condition.    Follow-up: prn for new or changing lesions    Staff and Scribe:     Scribe Disclosure:  I, Popeye Doan, am serving as a scribe to document services personally performed by Sena Chung PA-C based on data collection and the provider's statements to me.   Provider Disclosure:   The documentation recorded by the scribe accurately reflects the services I personally performed and the decisions made by me.    All risks, benefits and alternatives were discussed with patient.  Patient is in agreement and understands the assessment and plan.  All questions were answered.    Sena Chung PA-C, [facility]  [facility]: Phone: [phone], Fax: [phone]  [facility]: Phone: [phone],  Fax: [phone]  [facility] [place]: Phone: [phone], Fax: [phone]    ____________________________________________    CC: Keloid (Followu up keloids on chest) and Wart (Also discuss genital warts)    HPI:  Mr. Sea Solis is a(n) 42 year old male who presents today as a return patient for keloids and condyloma. Last seen in dermatology by Mami Umana PA-C on 9/30/2022, at which time patient received ILK for treatment of keloids and cryotherapy for treatment of condyloma.    Today, patient endorses a new keloid on his chest present for 5-6 months. It has flattened in the past, but always raises again. He also would like to address his genital warts, which he has a history of.     Patient is otherwise feeling well, without additional skin concerns.    Labs Reviewed:  N/A    Physical Exam:  Vitals: There were no vitals taken for this visit.  SKIN: Focused examination of the chest and genitals was performed.  - 2-3 mm verrucous papules on the genitals x10.  - Indurated hypertrophic papules on the central chest x3, L upper chest x1.  - No other lesions of concern on areas examined.     Medications:  Current Outpatient Medications   Medication     atorvastatin (LIPITOR) 10 MG tablet     ergocalciferol (ERGOCALCIFEROL) 1.25 MG (60292 UT) capsule     famotidine (PEPCID) 40 MG tablet     No current facility-administered medications for this visit.      Past Medical History:   Patient Active Problem List   Diagnosis     Latent tuberculosis     Erectile dysfunction, unspecified erectile dysfunction type     Hyperlipidemia     Tension headache     Sleep disturbance     Vitamin D deficiency     Depression, unspecified depression type     Past Medical History:   Diagnosis Date     Hyperlipidemia 3/21/2018           Again, thank you for allowing me to participate in the care of your  patient.        Sincerely,        Sena Chung PA-C

## 2023-05-11 RX ORDER — TRIAMCINOLONE ACETONIDE 40 MG/ML
8 INJECTION, SUSPENSION INTRA-ARTICULAR; INTRAMUSCULAR ONCE
Status: COMPLETED | OUTPATIENT
Start: 2023-05-11 | End: 2023-05-08

## 2023-06-03 ENCOUNTER — HEALTH MAINTENANCE LETTER (OUTPATIENT)
Age: 43
End: 2023-06-03

## 2023-07-15 ENCOUNTER — OFFICE VISIT (OUTPATIENT)
Dept: URGENT CARE | Facility: URGENT CARE | Age: 43
End: 2023-07-15
Payer: COMMERCIAL

## 2023-07-15 VITALS
TEMPERATURE: 97.4 F | OXYGEN SATURATION: 97 % | DIASTOLIC BLOOD PRESSURE: 67 MMHG | HEART RATE: 69 BPM | WEIGHT: 161.7 LBS | BODY MASS INDEX: 26.1 KG/M2 | SYSTOLIC BLOOD PRESSURE: 107 MMHG | RESPIRATION RATE: 20 BRPM

## 2023-07-15 DIAGNOSIS — R07.0 BURNING SENSATION OF THROAT: Primary | ICD-10-CM

## 2023-07-15 PROCEDURE — 99213 OFFICE O/P EST LOW 20 MIN: CPT | Performed by: PHYSICIAN ASSISTANT

## 2023-07-15 ASSESSMENT — ENCOUNTER SYMPTOMS
SHORTNESS OF BREATH: 0
MYALGIAS: 1
FATIGUE: 0
GASTROINTESTINAL NEGATIVE: 1
ARTHRALGIAS: 0
COUGH: 0
TROUBLE SWALLOWING: 0
WHEEZING: 0
FEVER: 0
SORE THROAT: 0
PALPITATIONS: 0
SINUS PAIN: 0
SINUS PRESSURE: 0
CHEST TIGHTNESS: 0
BACK PAIN: 0
RHINORRHEA: 0
HEARTBURN: 0
CHILLS: 0
RESPIRATORY NEGATIVE: 1
NECK STIFFNESS: 0
NECK PAIN: 1
CARDIOVASCULAR NEGATIVE: 1
JOINT SWELLING: 0

## 2023-07-15 NOTE — PROGRESS NOTES
"  Saloni Osei is a 42 year old, presenting for the following health issues:  Pharyngitis (X2 MONTHS; WILL HURT TO BACK OF NECK; OCCASIONAL hEADACHE)  HPI   Acute Illness  Acute illness concerns:   Onset/Duration: 2months  Symptoms:  Fever: No  Chills/Sweats: No  Headache (location?): YES  Sinus Pressure: No  Conjunctivitis:  No  Ear Pain: no  Rhinorrhea: No  Congestion: No  Sore Throat: No sore throat but describes a \"squeezing\", burning sensation in the throat for the past 2months.  No post nasal drainage or heartburn, reflux.  No swelling, difficulty swallowing or breathing.  Feels like this sensation will radiate into the back of his neck as well.  No numbness, tingling or weakness.  No trauma or injuries.  Cough: no  Wheeze: No  Decreased Appetite: No  Nausea: No  Vomiting: No  Diarrhea: No  Dysuria/Freq.: No  Dysuria or Hematuria: No  Fatigue/Achiness: No  Sick/Strep Exposure: No  Therapies tried and outcome: rest,fluids,stretches with minimal relief    Patient Active Problem List   Diagnosis     Latent tuberculosis     Erectile dysfunction, unspecified erectile dysfunction type     Hyperlipidemia     Tension headache     Sleep disturbance     Vitamin D deficiency     Depression, unspecified depression type     Current Outpatient Medications   Medication     atorvastatin (LIPITOR) 10 MG tablet     ergocalciferol (ERGOCALCIFEROL) 1.25 MG (63056 UT) capsule     famotidine (PEPCID) 40 MG tablet     imiquimod (ALDARA) 5 % external cream     metaxalone (SKELAXIN) 800 MG tablet     No current facility-administered medications for this visit.      No Known Allergies      Review of Systems   Constitutional: Negative for chills, fatigue and fever.   HENT: Negative for congestion, ear discharge, ear pain, hearing loss, postnasal drip, rhinorrhea, sinus pressure, sinus pain, sore throat and trouble swallowing.    Respiratory: Negative.  Negative for cough, chest tightness, shortness of breath and wheezing.  "   Cardiovascular: Negative.  Negative for chest pain, palpitations and peripheral edema.   Gastrointestinal: Negative.  Negative for heartburn.   Musculoskeletal: Positive for myalgias and neck pain. Negative for arthralgias, back pain, gait problem, joint swelling and neck stiffness.   All other systems reviewed and are negative.           Objective    /67   Pulse 69   Temp 97.4  F (36.3  C) (Tympanic)   Resp 20   Wt 73.3 kg (161 lb 11.2 oz)   SpO2 97%   BMI 26.10 kg/m    Body mass index is 26.1 kg/m .  Physical Exam  Vitals and nursing note reviewed.   Constitutional:       General: He is not in acute distress.     Appearance: Normal appearance. He is normal weight. He is not ill-appearing.   HENT:      Head: Normocephalic and atraumatic.      Ears:      Comments: TMs are intact without any erythema or bulging bilaterally.  Airway is patent.     Nose: Nose normal.      Mouth/Throat:      Lips: Pink.      Mouth: Mucous membranes are moist.      Pharynx: Oropharynx is clear. Uvula midline. No pharyngeal swelling, oropharyngeal exudate, posterior oropharyngeal erythema or uvula swelling.      Tonsils: No tonsillar exudate or tonsillar abscesses.   Eyes:      General: No scleral icterus.     Conjunctiva/sclera: Conjunctivae normal.      Pupils: Pupils are equal, round, and reactive to light.   Neck:      Thyroid: No thyromegaly.   Cardiovascular:      Rate and Rhythm: Normal rate and regular rhythm.      Pulses: Normal pulses.      Heart sounds: Normal heart sounds, S1 normal and S2 normal. No murmur heard.     No friction rub. No gallop.   Pulmonary:      Effort: Pulmonary effort is normal. No tachypnea, accessory muscle usage, respiratory distress or retractions.      Breath sounds: Normal breath sounds and air entry. No stridor. No decreased breath sounds, wheezing, rhonchi or rales.   Musculoskeletal:      Cervical back: Full passive range of motion without pain, normal range of motion and neck supple.  "No pain with movement, spinous process tenderness or muscular tenderness. Normal range of motion.   Lymphadenopathy:      Cervical: No cervical adenopathy.   Skin:     General: Skin is warm and dry.      Findings: No rash.   Neurological:      Mental Status: He is alert and oriented to person, place, and time.   Psychiatric:         Mood and Affect: Mood normal.         Behavior: Behavior normal.         Thought Content: Thought content normal.         Judgment: Judgment normal.            Assessment/Plan:  Burning sensation of throat:  Describes \"squeezing\" sensation of his throat but no pain, swelling or difficulty breathing or swallowing.  ?GERD vs esophageal spasms vs muscle spasms.  Will give trial of omeprazole and send to ENT for further evaluation and management.  Avoid sour/spicy foods, caffeine, ETOH/tobacco, and NSAIDs as these will irritate his stomach.  Avoid fatty, greasy, oily foods.  Sleep with head of bed elevated and avoid eating prior to bedtime.  Recheck in clinic if symptoms worsen or if symptoms do not improve.  -     Adult ENT  Referral  -     omeprazole (PRILOSEC) 20 MG DR capsule; Take 1 capsule (20 mg) by mouth daily        Destiny See GALILEO Ferreira            " no

## 2023-07-26 DIAGNOSIS — E78.5 HYPERLIPIDEMIA, UNSPECIFIED HYPERLIPIDEMIA TYPE: ICD-10-CM

## 2023-07-26 RX ORDER — ATORVASTATIN CALCIUM 10 MG/1
TABLET, FILM COATED ORAL
Qty: 90 TABLET | Refills: 2 | Status: SHIPPED | OUTPATIENT
Start: 2023-07-26

## 2023-07-26 NOTE — TELEPHONE ENCOUNTER
Prescription approved per [facility] Refill Protocol.  Laine Rosenberg, RN  [facility] Triage Nurse

## 2023-08-24 ENCOUNTER — OFFICE VISIT (OUTPATIENT)
Dept: FAMILY MEDICINE | Facility: CLINIC | Age: 43
End: 2023-08-24
Payer: COMMERCIAL

## 2023-08-24 DIAGNOSIS — L91.0 KELOID SCAR: Primary | ICD-10-CM

## 2023-08-24 PROCEDURE — 11900 INJECT SKIN LESIONS </W 7: CPT | Performed by: PHYSICIAN ASSISTANT

## 2023-08-24 RX ORDER — TRIAMCINOLONE ACETONIDE 40 MG/ML
40 INJECTION, SUSPENSION INTRA-ARTICULAR; INTRAMUSCULAR ONCE
Status: COMPLETED | OUTPATIENT
Start: 2023-08-24 | End: 2023-08-24

## 2023-08-24 RX ADMIN — TRIAMCINOLONE ACETONIDE 40 MG: 40 INJECTION, SUSPENSION INTRA-ARTICULAR; INTRAMUSCULAR at 17:04

## 2023-08-24 ASSESSMENT — PAIN SCALES - GENERAL: PAINLEVEL: SEVERE PAIN (6)

## 2023-08-24 NOTE — LETTER
8/24/2023         RE: Sea Solis  [address] [address] [facility] [address]        Dear Colleague,    Thank you for referring your patient, Sea Solis, to the [facility] [facility]. Please see a copy of my visit note below.    [facility] Dermatology Note  Encounter Date: Aug 24, 2023  Office Visit      Dermatology Problem List:  1. Keloids, chest s/p ILK  2. Condyloma  - s/p cryotherapy 3/16/2022, 5/26/2022, 9/30/2022  - Current tx: imiquimod 5% cream  ____________________________________________    Assessment & Plan:  # Keloid scar - alll improved, one small one on the central chest remains  - Intra-lesional Kenalog injections performed today, see procedure note below.    Procedures Performed:   - Intra-lesional triamcinolone procedure note. After verbal consent and review of risk of pain and skin thinning/atrophy, positioning and cleansing with isopropyl alcohol, 0.2 total mL of triamcinolone 40 mg/mL was injected into 1 lesion(s) on the central chest. The patient tolerated the procedure well and left the dermatology clinic in good condition.       Follow-up: prn for new or changing lesions    Staff:     All risks, benefits and alternatives were discussed with patient.  Patient is in agreement and understands the assessment and plan.  All questions were answered.    Sena Chung PA-C, MPAS  [facility]: Phone: [phone], Fax: [phone]  [facility]: Phone: [phone],  Fax: [phone]  [facility]: Phone: [phone], Fax: [phone]  ____________________________________________    CC: Derm Problem (Keloid injection)      HPI:  Mr. Sea Solis is a 43 year old male who presents today as a return patient for ILK to keloids on the chest all have resolved except one.     Patient is otherwise feeling well, without additional  concerns.    Labs:  none    Physical Exam:  Vitals: There were no vitals taken for this visit.  SKIN: Focused examination of face and chest was performed.   - 7mm indurated nodule on the central chest, adjacent to it is some hypopigmented. The rest of the keloids have resolved, and some hypopigmentation remains  - No other lesions of concern on areas examined.     Medications:  Current Outpatient Medications   Medication     atorvastatin (LIPITOR) 10 MG tablet     metaxalone (SKELAXIN) 800 MG tablet     ergocalciferol (ERGOCALCIFEROL) 1.25 MG (43112 UT) capsule     famotidine (PEPCID) 40 MG tablet     imiquimod (ALDARA) 5 % external cream     omeprazole (PRILOSEC) 20 MG DR capsule     No current facility-administered medications for this visit.      Past Medical/Surgical History:   Patient Active Problem List   Diagnosis     Latent tuberculosis     Erectile dysfunction, unspecified erectile dysfunction type     Hyperlipidemia     Tension headache     Sleep disturbance     Vitamin D deficiency     Depression, unspecified depression type     Past Medical History:   Diagnosis Date     Hyperlipidemia 3/21/2018                        Again, thank you for allowing me to participate in the care of your patient.        Sincerely,        Sena Chung PA-C

## 2023-08-24 NOTE — PROGRESS NOTES
[facility] Dermatology Note  Encounter Date: Aug 24, 2023  Office Visit      Dermatology Problem List:  1. Keloids, chest s/p ILK  2. Condyloma  - s/p cryotherapy 3/16/2022, 5/26/2022, 9/30/2022  - Current tx: imiquimod 5% cream  ____________________________________________    Assessment & Plan:  # Keloid scar - alll improved, one small one on the central chest remains  - Intra-lesional Kenalog injections performed today, see procedure note below.    Procedures Performed:   - Intra-lesional triamcinolone procedure note. After verbal consent and review of risk of pain and skin thinning/atrophy, positioning and cleansing with isopropyl alcohol, 0.2 total mL of triamcinolone 40 mg/mL was injected into 1 lesion(s) on the central chest. The patient tolerated the procedure well and left the dermatology clinic in good condition.       Follow-up: prn for new or changing lesions    Staff:     All risks, benefits and alternatives were discussed with patient.  Patient is in agreement and understands the assessment and plan.  All questions were answered.    Sena Chung PA-C, MPAS  [facility]: Phone: [phone], Fax: [phone]  [facility]: Phone: [phone],  Fax: [phone]  [facility]: Phone: [phone], Fax: [phone]  ____________________________________________    CC: Derm Problem (Keloid injection)      HPI:  Mr. Sea Solis is a 43 year old male who presents today as a return patient for ILK to keloids on the chest all have resolved except one.     Patient is otherwise feeling well, without additional concerns.    Labs:  none    Physical Exam:  Vitals: There were no vitals taken for this visit.  SKIN: Focused examination of face and chest was performed.   - 7mm indurated nodule on the central chest, adjacent to it is some hypopigmented. The rest of the keloids have  resolved, and some hypopigmentation remains  - No other lesions of concern on areas examined.     Medications:  Current Outpatient Medications   Medication    atorvastatin (LIPITOR) 10 MG tablet    metaxalone (SKELAXIN) 800 MG tablet    ergocalciferol (ERGOCALCIFEROL) 1.25 MG (45866 UT) capsule    famotidine (PEPCID) 40 MG tablet    imiquimod (ALDARA) 5 % external cream    omeprazole (PRILOSEC) 20 MG DR capsule     No current facility-administered medications for this visit.      Past Medical/Surgical History:   Patient Active Problem List   Diagnosis    Latent tuberculosis    Erectile dysfunction, unspecified erectile dysfunction type    Hyperlipidemia    Tension headache    Sleep disturbance    Vitamin D deficiency    Depression, unspecified depression type     Past Medical History:   Diagnosis Date    Hyperlipidemia 3/21/2018

## 2023-09-19 ENCOUNTER — OFFICE VISIT (OUTPATIENT)
Dept: URGENT CARE | Facility: URGENT CARE | Age: 43
End: 2023-09-19
Payer: COMMERCIAL

## 2023-09-19 VITALS
SYSTOLIC BLOOD PRESSURE: 111 MMHG | OXYGEN SATURATION: 99 % | RESPIRATION RATE: 18 BRPM | HEART RATE: 70 BPM | BODY MASS INDEX: 26.28 KG/M2 | DIASTOLIC BLOOD PRESSURE: 64 MMHG | WEIGHT: 162.8 LBS | TEMPERATURE: 97.3 F

## 2023-09-19 DIAGNOSIS — M54.2 NECK PAIN: ICD-10-CM

## 2023-09-19 DIAGNOSIS — R07.0 BURNING SENSATION OF THROAT: Primary | ICD-10-CM

## 2023-09-19 PROCEDURE — 99213 OFFICE O/P EST LOW 20 MIN: CPT

## 2023-09-19 NOTE — PROGRESS NOTES
Assessment & Plan   (R07.0) Burning sensation of throat  (primary encounter diagnosis)  Plan: Adult ENT  Referral    (M54.2) Neck pain    Informed the patient to take Tylenol and or ibuprofen as needed for pain with the maximum dose Tylenol being 4000 mg in 24 period of time and to take ibuprofen with food to avoid upset stomach.  We discussed using ice/heat to the neck for pain.  Instructed him to follow-up with ENT for further evaluation and treatment.  We also discussed that his hepatitis B vaccine could be updated for him with a primary care appointment.  Patient acknowledged his understanding of the above plan.    The use of Dragon/Erly dictation services may have been used to construct the content in this note; any grammatical or spelling errors are non-intentional. Please contact the author of this note directly if you are in need of any clarification.      JENNIFER Urias [facility]    Saloni Osei is a 43 year old male who presents to clinic today for the following health issues:  Chief Complaint   Patient presents with    Pain     Throat, upper back, and neck pain for the past 4-5 months. Faigue and itchy throat. No cough. Wants hepatitis B vaccine as it is overdue. Came to  2 months ago and symptoms are not better.      HPI  Patient reports having a burning sensation in his throat and neck pain that radiates up into the occipital region of his head for the past 4 to 5 months.  He began taking omeprazole in July for the symptoms but stopped indicating that the omeprazole made his symptoms worse.  He would also like his hepatitis B vaccine today.    ROS:  Negative except noted above.    Review of Systems        Objective    /64 (BP Location: Left arm, Patient Position: Sitting, Cuff Size: Adult Regular)   Pulse 70   Temp 97.3  F (36.3  C) (Tympanic)   Resp 18   Wt 73.8 kg (162 lb 12.8 oz)   SpO2 99%   BMI 26.28 kg/m     Physical Exam   GENERAL: healthy, alert and no distress  EYES: Eyes grossly normal to inspection, PERRL and conjunctivae and sclerae normal  HENT: ear canals and TM's normal, nose and mouth without ulcers or lesions  NECK: no adenopathy, no asymmetry, masses, or scars and thyroid normal to palpation  RESP: lungs clear to auscultation - no rales, rhonchi or wheezes  CV: regular rate and rhythm, normal S1 S2, no S3 or S4, no murmur, click or rub, no peripheral edema and peripheral pulses strong  MS: no gross musculoskeletal defects noted, no edema  SKIN: no suspicious lesions or rashes  NEURO: Normal strength and tone, mentation intact and speech normal  PSYCH: mentation appears normal, affect normal/bright

## 2023-09-19 NOTE — PATIENT INSTRUCTIONS
Can use Tylenol and/or ibuprofen as needed for pain.  Maximum dose of Tylenol is 4000mg in a 24 hour period of time.  Take ibuprofen with food to avoid stomach upset.  Use ice/heat to the neck for pain.  Follow up with ENT as discussed.

## 2023-09-23 NOTE — LETTER
11/11/2020         RE: Sea Solis  [address] [facility] [address]        Dear Colleague,    Thank you for referring your patient, Sea Solis, to the [facility]. Please see a copy of my visit note below.    HPI:  Sea Solis is a 40 year old male patient here today for scars on penis and chest with improvement with IL TAC.  Patient states this has been present for a while.  Patient reports the following symptoms: bothersome .  Patient reports the following previous treatments: IL TAC.  Patient reports the following modifying factors: none.  Associated symptoms: none.  Patient has no other skin complaints today.  Remainder of the HPI, Meds, PMH, Allergies, FH, and SH was reviewed in chart.      Past Medical History:   Diagnosis Date     Hyperlipidemia 3/21/2018       No past surgical history on file.     History reviewed. No pertinent family history.    Social History     Socioeconomic History     Marital status:      Spouse name: Not on file     Number of children: Not on file     Years of education: Not on file     Highest education level: Not on file   Occupational History     Not on file   Social Needs     Financial resource strain: Not on file     Food insecurity     Worry: Not on file     Inability: Not on file     Transportation needs     Medical: Not on file     Non-medical: Not on file   Tobacco Use     Smoking status: Former Smoker     Smokeless tobacco: Never Used   Substance and Sexual Activity     Alcohol use: Yes     Alcohol/week: 0.0 standard drinks     Comment: 2-3x week     Drug use: Yes     Types: Marijuana     Comment: Occasional     Sexual activity: Yes     Partners: Female   Lifestyle     Physical activity     Days per week: Not on file     Minutes per session: Not on file     Stress: Not on file   Relationships     Social connections     Talks on phone: Not on file     Gets together: Not on file     Attends Taoism  service: Not on file     Active member of club or organization: Not on file     Attends meetings of clubs or organizations: Not on file     Relationship status: Not on file     Intimate partner violence     Fear of current or ex partner: Not on file     Emotionally abused: Not on file     Physically abused: Not on file     Forced sexual activity: Not on file   Other Topics Concern     Not on file   Social History Narrative     Not on file       Outpatient Encounter Medications as of 11/11/2020   Medication Sig Dispense Refill     atorvastatin (LIPITOR) 20 MG tablet Take 1 tablet (20 mg) by mouth daily For cholesterol. (Patient not taking: Reported on 5/7/2020) 90 tablet 3     escitalopram (LEXAPRO) 10 MG tablet Take 1 tablet (10 mg) by mouth daily (Patient not taking: Reported on 5/20/2020) 30 tablet 1     ibuprofen (ADVIL/MOTRIN) 800 MG tablet Take 1 tablet (800 mg) by mouth every 8 hours as needed for moderate pain (Patient not taking: Reported on 3/18/2019) 60 tablet 1     sildenafil (VIAGRA) 50 MG tablet Take 1 tablet (50 mg) by mouth daily as needed 30 min to 4 hrs before sex. Do not use with nitroglycerin, terazosin or doxazosin. (Patient not taking: Reported on 5/7/2020) 6 tablet 1     [DISCONTINUED] azithromycin (ZITHROMAX) 250 MG tablet Take 2 tablets on the 1st day and one tablet daily for the next 4 days (Patient not taking: Reported on 6/22/2020) 6 tablet 0     No facility-administered encounter medications on file as of 11/11/2020.        Review Of Systems:  Skin: keloids  Eyes: negative  Ears/Nose/Throat: negative  Respiratory: No shortness of breath, dyspnea on exertion, cough, or hemoptysis  Cardiovascular: negative  Gastrointestinal: negative  Genitourinary: negative  Musculoskeletal: negative  Neurologic: negative  Psychiatric: negative  Hematologic/Lymphatic/Immunologic: negative  Endocrine: negative      Objective:     /68   Pulse 69   SpO2 99%   Eyes: Conjunctivae/lids: Normal   ENT:  Lips:  Normal  MSK: Normal  Cardiovascular: Peripheral edema none  Pulm: Breathing Normal  Neuro/Psych: Orientation: A/O x 3 Normal; Mood/Affect: Normal, NAD, WDWN  Pt accompanied by: self  Following areas examined: face ( wearing mask)  Vill[place]ana skin type:v   Findings:  Smooth brown papules on chest  Assessment and Plan:  1)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  Risks including but not limited to injection site reaction, bruising, no resolution.  All questions answered and entertained to patient s satisfaction.  Informed consent obtained.  IL TAC in concentration of 20mg/ml was injected ID to 2 lesions on penis and suprapubic area.  Total injected was  0.15 ml.  Patient tolerated without complications and given wound care instructions, including not to move product around.  Return in 4 weeks for follow-up and possible additional IL TAC. May need additional treatment. May not be effective.    Lot #:[id number]  Exp: 10/21  Sodium chloride  Lot #:[id number]  Exp: 01/dec/2021    IL TAC: PGACAC discussed.  Risks including but not limited to injection site reaction, bruising, no resolution.  All questions answered and entertained to patient s satisfaction.  Informed consent obtained.  IL TAC in concentration of 40mg/ml was injected ID to  Keloids chest x4.  Total injected was  0.15 ml.  Patient tolerated without complications and given wound care instructions, including not to move product around.  Return in 4 weeks for follow-up and possible additional IL TAC. May need additional treatment. May not be effective.    Lot #:[id number]  Exp: 10/21        Follow up in one month        Again, thank you for allowing me to participate in the care of your patient.        Sincerely,        Aditi Faustin PA-C     No

## 2023-10-02 ENCOUNTER — ALLIED HEALTH/NURSE VISIT (OUTPATIENT)
Dept: FAMILY MEDICINE | Facility: CLINIC | Age: 43
End: 2023-10-02
Payer: COMMERCIAL

## 2023-10-02 DIAGNOSIS — Z23 ENCOUNTER FOR IMMUNIZATION: Primary | ICD-10-CM

## 2023-10-02 PROCEDURE — 90471 IMMUNIZATION ADMIN: CPT

## 2023-10-02 PROCEDURE — 90472 IMMUNIZATION ADMIN EACH ADD: CPT

## 2023-10-02 PROCEDURE — 90686 IIV4 VACC NO PRSV 0.5 ML IM: CPT

## 2023-10-02 PROCEDURE — 99207 PR NO CHARGE NURSE ONLY: CPT

## 2023-10-02 PROCEDURE — 90746 HEPB VACCINE 3 DOSE ADULT IM: CPT

## 2023-10-02 NOTE — PROGRESS NOTES
Prior to immunization administration, verified patients identity using patient s name and date of birth. Please see Immunization Activity for additional information.     Screening Questionnaire for Adult Immunization    Are you sick today?   No   Do you have allergies to medications, food, a vaccine component or latex?   No   Have you ever had a serious reaction after receiving a vaccination?   No   Do you have a long-term health problem with heart, lung, kidney, or metabolic disease (e.g., diabetes), asthma, a blood disorder, no spleen, complement component deficiency, a cochlear implant, or a spinal fluid leak?  Are you on long-term aspirin therapy?   No   Do you have cancer, leukemia, HIV/AIDS, or any other immune system problem?   No   Do you have a parent, brother, or sister with an immune system problem?   No   In the past 3 months, have you taken medications that affect  your immune system, such as prednisone, other steroids, or anticancer drugs; drugs for the treatment of rheumatoid arthritis, Crohn s disease, or psoriasis; or have you had radiation treatments?   No   Have you had a seizure, or a brain or other nervous system problem?   No   During the past year, have you received a transfusion of blood or blood    products, or been given immune (gamma) globulin or antiviral drug?   No   For women: Are you pregnant or is there a chance you could become       pregnant during the next month?   No   Have you received any vaccinations in the past 4 weeks?   No     Immunization questionnaire answers were all negative.    I have reviewed the following standing orders:   This patient is due and qualifies for the Hepatitis B vaccine.    Click here for Hepatitis B Standing Order    I have reviewed the vaccines inclusion and exclusion criteria; No concerns regarding eligibility.         This patient is due and qualifies for the Influenza vaccine.    Click here for Influenza Vaccine Standing Order    I have reviewed  the vaccines inclusion and exclusion criteria; No concerns regarding eligibility.     Patient instructed to remain in clinic for 15 minutes afterwards, and to report any adverse reactions.     Screening performed by Sue Contreras on 10/2/2023 at 10:39 AM.

## 2023-11-27 ENCOUNTER — OFFICE VISIT (OUTPATIENT)
Dept: FAMILY MEDICINE | Facility: CLINIC | Age: 43
End: 2023-11-27

## 2023-11-27 DIAGNOSIS — A63.0 CONDYLOMA: Primary | ICD-10-CM

## 2023-11-27 DIAGNOSIS — L91.0 KELOID SCAR: ICD-10-CM

## 2023-11-27 PROCEDURE — 11901 INJECT SKIN LESIONS >7: CPT | Mod: XS | Performed by: PHYSICIAN ASSISTANT

## 2023-11-27 PROCEDURE — 54056 CRYOSURGERY PENIS LESION(S): CPT | Performed by: PHYSICIAN ASSISTANT

## 2023-11-27 PROCEDURE — 99213 OFFICE O/P EST LOW 20 MIN: CPT | Mod: 25 | Performed by: PHYSICIAN ASSISTANT

## 2023-11-27 RX ORDER — PODOFILOX 5 MG/ML
SOLUTION TOPICAL
Qty: 3.5 ML | Refills: 3 | Status: SHIPPED | OUTPATIENT
Start: 2023-11-27

## 2023-11-27 ASSESSMENT — PAIN SCALES - GENERAL: PAINLEVEL: NO PAIN (0)

## 2023-11-27 NOTE — LETTER
11/27/2023         RE: Sea Solis  [address] [address] [facility] [address]        Dear Colleague,    Thank you for referring your patient, Sea Solis, to the [facility]. Please see a copy of my visit note below.    [facility] Dermatology Note  Encounter Date: Nov 27, 2023  Office Visit      Dermatology Problem List:  1. Keloids, chest s/p ILK  2. Condyloma  - S/p Cantharone treatment 11/27/23, cryotherapy 3/16/2022, 5/26/2022, 9/30/2022, imiquimod 5% cream  - Current tx: podofilox gel  ____________________________________________    Assessment & Plan:  # Keloid scars - all improved, one small one on the central chest remains, two appear to be regrowing and he would like those injected again today as well  - Intra-lesional Kenalog injections performed today, see procedure note below.    # Condyloma acuminata  - Treated with Cantharone x 7 lesion on the shaft of the penis - patient to wash off with soap and water in 4 hours - edu on potential for blisters. Patient states he prefers this over cryo.  - edu on etiology, spread via contact  - avoid sexual activity if/when wart lesions are present  - Start on Podofilox gel BID x 3 days, then hold for 4 days. Then repeat for 3 days. May use for up to 4 cycles. Patient prefers this over imiquimod.    Procedures Performed:   - Intra-lesional triamcinolone procedure note. After verbal consent and review of risk of pain and skin thinning/atrophy, positioning and cleansing with isopropyl alcohol, 0.2 total mL of triamcinolone 40 mg/mL was injected into 3 lesion(s) on the central chest. The patient tolerated the procedure well and left the dermatology clinic in good condition.     Follow-up: prn for new or changing lesions    Staff and scribe     Scribe disclosure  I, Susan Paniagua, am serving as a scribe to document services personally performed by Sena Chung PA-C based on data collection and the provider's  statements to me.     All risks, benefits and alternatives were discussed with patient.  Patient is in agreement and understands the assessment and plan.  All questions were answered.    Sena Chung PA-C, MPAS  [facility] [place]: Phone: [phone], Fax: [phone]  [facility]: Phone: [phone],  Fax: [phone]  [facility]: Phone: [phone], Fax: [phone]  ____________________________________________    CC: Derm Problem (1.  Follow up keloid on chest/2.  Cryo, genital warts)      Reviewed patients past medical history and pertinent chart review prior to patient's visit today.     HPI:  Mr. Sea Solis is a 43 year old male who presents today as a return patient for follow up on Keloid on his chest. Also for treatment of genital warts. Notes few spots coming back on  the shaft of penis.    Patient is otherwise feeling well, without additional concerns.    Labs:  None     Physical Exam:  Vitals: There were no vitals taken for this visit.  SKIN: Focused examination of face and chest was performed.   - 7mm indurated nodule on the central chest, adjacent to it is some hypopigmented. R medial chest with 3mm indurated papule, superior central chest with 5mm indurated papule. The rest of the keloids have resolved, and some hypopigmentation remains  - flat topped hyperpigmented papules along the L shaft and L scrotum of the penis x7  - No other lesions of concern on areas examined.     Medications:  Current Outpatient Medications   Medication     atorvastatin (LIPITOR) 10 MG tablet     ergocalciferol (ERGOCALCIFEROL) 1.25 MG (06680 UT) capsule     famotidine (PEPCID) 40 MG tablet     imiquimod (ALDARA) 5 % external cream     metaxalone (SKELAXIN) 800 MG tablet     omeprazole (PRILOSEC) 20 MG DR capsule     No current facility-administered medications for this visit.      Past Medical/Surgical  History:   Patient Active Problem List   Diagnosis     Latent tuberculosis     Erectile dysfunction, unspecified erectile dysfunction type     Hyperlipidemia     Tension headache     Sleep disturbance     Vitamin D deficiency     Depression, unspecified depression type     Past Medical History:   Diagnosis Date     Hyperlipidemia 3/21/2018                        Again, thank you for allowing me to participate in the care of your patient.        Sincerely,        Sena Chung PA-C

## 2023-11-27 NOTE — PATIENT INSTRUCTIONS
Patient Education       Proper skin care from [facility]:    -Eliminate harsh soaps as they strip the natural oils from the skin, often resulting in dry itchy skin ( i.e. Dial, Zest, Divehi Spring)  -Use mild soaps such as Cetaphil or Dove Sensitive Skin in the shower. You do not need to use soap on arms, legs, and trunk every time you shower unless visibly soiled.   -Avoid hot or cold showers.  -After showering, lightly dry off and apply moisturizing within 2-3 minutes. This will help trap moisture in the skin.   -Aggressive use of a moisturizer at least 1-2 times a day to the entire body (including -Vanicream, Cetaphil, Aquaphor or Cerave) and moisturize hands after every washing.  -We recommend using moisturizers that come in a tub that needs to be scooped out, not a pump. This has more of an oil base. It will hold moisture in your skin much better than a water base moisturizer. The above recommended are non-pore clogging.      Wear a sunscreen with at least SPF 30 on your face, ears, neck and V of the chest daily. Wear sunscreen on other areas of the body if those areas are exposed to the sun throughout the day. Sunscreens can contain physical and/or chemical blockers. Physical blockers are less likely to clog pores, these include zinc oxide and titanium dioxide. Reapply every two hour and after swimming.     Sunscreen examples: https://www.ewg.org/sunscreen/    UV radiation  UVA radiation remains constant throughout the day and throughout the year. It is a longer wavelength than UVB and therefore penetrates deeper into the skin leading to immediate and delayed tanning, photoaging, and skin cancer. 70-80% of UVA and UVB radiation occurs between the hours of 10am-2pm.  UVB radiation  UVB radiation causes the most harmful effects and is more significant during the summer months. However, snow and ice can reflect UVB radiation leading to skin damage during the winter months as well. UVB radiation is  responsible for tanning, burning, inflammation, delayed erythema (pinkness), pigmentation (brown spots), and skin cancer.     I recommend self monthly full body exams and yearly full body exams with a dermatology provider. If you develop a new or changing lesion please follow up for examination. Most skin cancers are pink and scaly or pink and pearly. However, we do see blue/brown/black skin cancers.  Consider the ABCDEs of melanoma when giving yourself your monthly full body exam ( don't forget the groin, buttocks, feet, toes, etc). A-asymmetry, B-borders, C-color, D-diameter, E-elevation or evolving. If you see any of these changes please follow up in clinic. If you cannot see your back I recommend purchasing a hand held mirror to use with a larger wall mirror.       Checking for Skin Cancer  You can find cancer early by checking your skin each month. There are 3 kinds of skin cancer. They are melanoma, basal cell carcinoma, and squamous cell carcinoma. Doing monthly skin checks is the best way to find new marks or skin changes. Follow the instructions below for checking your skin.   The ABCDEs of checking moles for melanoma   Check your moles or growths for signs of melanoma using ABCDE:   Asymmetry: the sides of the mole or growth don t match  Border: the edges are ragged, notched, or blurred  Color: the color within the mole or growth varies  Diameter: the mole or growth is larger than 6 mm (size of a pencil eraser)  Evolving: the size, shape, or color of the mole or growth is changing (evolving is not shown in the images below)    Checking for other types of skin cancer  Basal cell carcinoma or squamous cell carcinoma have symptoms such as:     A spot or mole that looks different from all other marks on your skin  Changes in how an area feels, such as itching, tenderness, or pain  Changes in the skin's surface, such as oozing, bleeding, or scaliness  A sore that does not heal  New swelling or redness beyond  the border of a mole    Who s at risk?  Anyone can get skin cancer. But you are at greater risk if you have:   Fair skin, light-colored hair, or light-colored eyes  Many moles or abnormal moles on your skin  A history of sunburns from sunlight or tanning beds  A family history of skin cancer  A history of exposure to radiation or chemicals  A weakened immune system  If you have had skin cancer in the past, you are at risk for recurring skin cancer.   How to check your skin  Do your monthly skin checkups in front of a full-length mirror. Check all parts of your body, including your:   Head (ears, face, neck, and scalp)  Torso (front, back, and sides)  Arms (tops, undersides, upper, and lower armpits)  Hands (palms, backs, and fingers, including under the nails)  Buttocks and genitals  Legs (front, back, and sides)  Feet (tops, soles, toes, including under the nails, and between toes)  If you have a lot of moles, take digital photos of them each month. Make sure to take photos both up close and from a distance. These can help you see if any moles change over time.   Most skin changes are not cancer. But if you see any changes in your skin, call your doctor right away. Only he or she can diagnose a problem. If you have skin cancer, seeing your doctor can be the first step toward getting the treatment that could save your life.   [employer] last reviewed this educational content on 4/1/2019 2000-2020 [employer]. [address] [place] [address], [place] [address]. All rights reserved. This information is not intended as a substitute for professional medical care. Always follow your healthcare professional's instructions.       When should I call my doctor?  If you are worsening or not improving, please, contact us or seek urgent care as noted below.     Who should I call with questions (adults)?  [facility] (adult and pediatric): [phone]  [facility]  [place] (adult): [phone]  [facility] ([place], [place], [place] and [place]) [phone]  For urgent needs outside of business hours call the [facility] at [phone] and ask for the dermatology resident on call to be paged  If this is a medical emergency and you are unable to reach an ER, Call 911      If you need a prescription refill, please contact your pharmacy. Refills are approved or denied by our Physicians during normal business hours, Monday through Fridays  Per office policy, refills will not be granted if you have not been seen within the past year (or sooner depending on your child's condition)

## 2023-11-27 NOTE — PROGRESS NOTES
[facility] Dermatology Note  Encounter Date: Nov 27, 2023  Office Visit      Dermatology Problem List:  1. Keloids, chest s/p ILK  2. Condyloma  - S/p Cantharone treatment 11/27/23, cryotherapy 3/16/2022, 5/26/2022, 9/30/2022, imiquimod 5% cream  - Current tx: podofilox gel  ____________________________________________    Assessment & Plan:  # Keloid scars - all improved, one small one on the central chest remains, two appear to be regrowing and he would like those injected again today as well  - Intra-lesional Kenalog injections performed today, see procedure note below.    # Condyloma acuminata  - Treated with Cantharone x 7 lesion on the shaft of the penis - patient to wash off with soap and water in 4 hours - edu on potential for blisters. Patient states he prefers this over cryo.  - edu on etiology, spread via contact  - avoid sexual activity if/when wart lesions are present  - Start on Podofilox gel BID x 3 days, then hold for 4 days. Then repeat for 3 days. May use for up to 4 cycles. Patient prefers this over imiquimod.    Procedures Performed:   - Intra-lesional triamcinolone procedure note. After verbal consent and review of risk of pain and skin thinning/atrophy, positioning and cleansing with isopropyl alcohol, 0.2 total mL of triamcinolone 40 mg/mL was injected into 3 lesion(s) on the central chest. The patient tolerated the procedure well and left the dermatology clinic in good condition.     Follow-up: prn for new or changing lesions    Staff and scribe     Scribe disclosure  I, Susan Paniagua, am serving as a scribe to document services personally performed by Sena Chung PA-C based on data collection and the provider's statements to me.     All risks, benefits and alternatives were discussed with patient.  Patient is in agreement and understands the assessment and plan.  All questions were answered.    Sena Chung PA-C, MPAS  [facility] -  [facility]: Phone: [phone], Fax: [phone]  [facility]: Phone: [phone],  Fax: [phone]  [facility]: Phone: [phone], Fax: [phone]  ____________________________________________    CC: Derm Problem (1.  Follow up keloid on chest/2.  Cryo, genital warts)      Reviewed patients past medical history and pertinent chart review prior to patient's visit today.     HPI:  Mr. Sea Solis is a 43 year old male who presents today as a return patient for follow up on Keloid on his chest. Also for treatment of genital warts. Notes few spots coming back on  the shaft of penis.    Patient is otherwise feeling well, without additional concerns.    Labs:  None     Physical Exam:  Vitals: There were no vitals taken for this visit.  SKIN: Focused examination of face and chest was performed.   - 7mm indurated nodule on the central chest, adjacent to it is some hypopigmented. R medial chest with 3mm indurated papule, superior central chest with 5mm indurated papule. The rest of the keloids have resolved, and some hypopigmentation remains  - flat topped hyperpigmented papules along the L shaft and L scrotum of the penis x7  - No other lesions of concern on areas examined.     Medications:  Current Outpatient Medications   Medication    atorvastatin (LIPITOR) 10 MG tablet    ergocalciferol (ERGOCALCIFEROL) 1.25 MG (24806 UT) capsule    famotidine (PEPCID) 40 MG tablet    imiquimod (ALDARA) 5 % external cream    metaxalone (SKELAXIN) 800 MG tablet    omeprazole (PRILOSEC) 20 MG DR capsule     No current facility-administered medications for this visit.      Past Medical/Surgical History:   Patient Active Problem List   Diagnosis    Latent tuberculosis    Erectile dysfunction, unspecified erectile dysfunction type    Hyperlipidemia    Tension headache    Sleep disturbance    Vitamin D deficiency    Depression, unspecified depression type      Past Medical History:   Diagnosis Date    Hyperlipidemia 3/21/2018
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
## 2024-01-22 ENCOUNTER — OFFICE VISIT (OUTPATIENT)
Dept: OTOLARYNGOLOGY | Facility: CLINIC | Age: 44
End: 2024-01-22

## 2024-01-22 VITALS
OXYGEN SATURATION: 98 % | WEIGHT: 170 LBS | HEART RATE: 78 BPM | DIASTOLIC BLOOD PRESSURE: 74 MMHG | BODY MASS INDEX: 27.44 KG/M2 | RESPIRATION RATE: 16 BRPM | SYSTOLIC BLOOD PRESSURE: 129 MMHG

## 2024-01-22 DIAGNOSIS — R07.0 BURNING SENSATION OF THROAT: ICD-10-CM

## 2024-01-22 DIAGNOSIS — M54.2 CERVICALGIA: Primary | ICD-10-CM

## 2024-01-22 DIAGNOSIS — R20.2 FACIAL PARESTHESIA: ICD-10-CM

## 2024-01-22 PROCEDURE — 99203 OFFICE O/P NEW LOW 30 MIN: CPT | Performed by: OTOLARYNGOLOGY

## 2024-01-22 ASSESSMENT — PAIN SCALES - GENERAL: PAINLEVEL: NO PAIN (0)
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
## 2024-01-30 ENCOUNTER — TELEPHONE (OUTPATIENT)
Dept: OTOLARYNGOLOGY | Facility: CLINIC | Age: 44
End: 2024-01-30

## 2024-01-30 NOTE — TELEPHONE ENCOUNTER
Reason for Call:  Other call back    Detailed comments: Patient left a voice message requesting a letter from Dr. Flower regarding his condition. Please call patient to advise Thank you     Phone Number Patient can be reached at: Home number on file [phone] (home) / also his wife -[phone]    Best Time: any    Can we leave a detailed message on this number? YES    Call taken on 1/30/2024 at 7:48 AM by Sarah Arguello

## 2024-01-31 NOTE — TELEPHONE ENCOUNTER
Tried calling patient to notify this:    Triage Team    Please print and send my clinic note, as it describes my clinical impression and plan.    If additional documentation or clarification is needed, please request that the patient's  send me a note that explains specifically what they are asking me to comment on and what is being requested.  Otherwise I feel that my clinic note summarizes things.    Thanks   Alin Flower     There was no answer. Phone kept ringing. Will try to call at a later time.     Komal ESCOBEDO RN, [facility] 01/31/24 10:14 AM

## 2024-02-01 NOTE — TELEPHONE ENCOUNTER
Tried calling patient again and I could not get through to patient. I could not leave message.     Komal ESCOBEDO RN, [facility] 02/01/24 8:55 AM

## 2024-07-13 ENCOUNTER — HEALTH MAINTENANCE LETTER (OUTPATIENT)
Age: 44
End: 2024-07-13

## 2025-07-19 ENCOUNTER — HEALTH MAINTENANCE LETTER (OUTPATIENT)
Age: 45
End: 2025-07-19